# Patient Record
Sex: FEMALE | Race: WHITE | Employment: OTHER | URBAN - METROPOLITAN AREA
[De-identification: names, ages, dates, MRNs, and addresses within clinical notes are randomized per-mention and may not be internally consistent; named-entity substitution may affect disease eponyms.]

---

## 2017-02-13 ENCOUNTER — HOSPITAL ENCOUNTER (OUTPATIENT)
Dept: MAMMOGRAPHY | Facility: HOSPITAL | Age: 72
Discharge: HOME | End: 2017-02-13
Attending: INTERNAL MEDICINE
Payer: MEDICARE

## 2017-02-13 DIAGNOSIS — Z12.31 VISIT FOR SCREENING MAMMOGRAM: ICD-10-CM

## 2017-02-13 PROCEDURE — 77067 SCR MAMMO BI INCL CAD: CPT

## 2017-04-04 ENCOUNTER — APPOINTMENT (OUTPATIENT)
Dept: LAB | Age: 72
End: 2017-04-04
Attending: INTERNAL MEDICINE
Payer: MEDICARE

## 2017-04-04 DIAGNOSIS — E11.9 TYPE 2 DIABETES MELLITUS WITHOUT COMPLICATION, WITHOUT LONG-TERM CURRENT USE OF INSULIN (HCC): ICD-10-CM

## 2017-04-04 PROCEDURE — 83036 HEMOGLOBIN GLYCOSYLATED A1C: CPT

## 2017-04-04 PROCEDURE — 36415 COLL VENOUS BLD VENIPUNCTURE: CPT

## 2017-04-11 ENCOUNTER — OFFICE VISIT (OUTPATIENT)
Dept: INTERNAL MEDICINE CLINIC | Facility: CLINIC | Age: 72
End: 2017-04-11

## 2017-04-11 VITALS
DIASTOLIC BLOOD PRESSURE: 60 MMHG | OXYGEN SATURATION: 98 % | HEIGHT: 63 IN | SYSTOLIC BLOOD PRESSURE: 148 MMHG | HEART RATE: 74 BPM | BODY MASS INDEX: 26.93 KG/M2 | TEMPERATURE: 99 F | WEIGHT: 152 LBS

## 2017-04-11 DIAGNOSIS — E78.2 HYPERLIPIDEMIA, MIXED: ICD-10-CM

## 2017-04-11 DIAGNOSIS — E11.9 TYPE 2 DIABETES MELLITUS WITHOUT COMPLICATION, WITHOUT LONG-TERM CURRENT USE OF INSULIN (HCC): Primary | ICD-10-CM

## 2017-04-11 DIAGNOSIS — I10 ESSENTIAL HYPERTENSION: ICD-10-CM

## 2017-04-11 PROCEDURE — 99214 OFFICE O/P EST MOD 30 MIN: CPT | Performed by: INTERNAL MEDICINE

## 2017-04-11 PROCEDURE — G0463 HOSPITAL OUTPT CLINIC VISIT: HCPCS | Performed by: INTERNAL MEDICINE

## 2017-04-11 RX ORDER — AMLODIPINE BESYLATE 10 MG/1
10 TABLET ORAL DAILY
Qty: 90 TABLET | Refills: 3 | Status: SHIPPED | OUTPATIENT
Start: 2017-04-11 | End: 2018-03-20

## 2017-04-11 RX ORDER — GLIMEPIRIDE 4 MG/1
4 TABLET ORAL
Qty: 90 TABLET | Refills: 3 | Status: SHIPPED | OUTPATIENT
Start: 2017-04-11 | End: 2017-07-18

## 2017-04-11 NOTE — PROGRESS NOTES
Mauricio Selby is a 66year old female who presents for     Check at 3 1/2  mo     Feels good.      Diabetes:      sugars are 95 to 125.   No low sugars. Tolerated increase of amaryl to 3 mg daily. \"I think it brought it down a little bit. \"     HTN; H SpO2 98%      Wt Readings from Last 6 Encounters:  04/11/17 : 152 lb (68.947 kg)  12/28/16 : 153 lb (69.4 kg)  07/15/16 : 153 lb (69.4 kg)  04/15/16 : 153 lb (69.4 kg)  03/02/16 : 156 lb (70.761 kg)  10/06/15 : 156 lb (70.761 kg)    bp on my recheck is 144 total) by mouth every morning before breakfast. Disp: 90 tablet Rfl: 3   AmLODIPine Besylate (NORVASC) 10 MG Oral Tab Take 1 tablet (10 mg total) by mouth daily.  Disp: 90 tablet Rfl: 3   Enalapril Maleate (VASOTEC) 20 MG Oral Tab Take 1 tablet (20 mg total

## 2017-05-16 ENCOUNTER — TELEPHONE (OUTPATIENT)
Dept: INTERNAL MEDICINE CLINIC | Facility: CLINIC | Age: 72
End: 2017-05-16

## 2017-05-16 RX ORDER — SIMVASTATIN 5 MG
5 TABLET ORAL DAILY
Qty: 90 TABLET | Refills: 3 | Status: SHIPPED | OUTPATIENT
Start: 2017-05-16 | End: 2018-03-20

## 2017-05-16 RX ORDER — ENALAPRIL MALEATE 20 MG/1
20 TABLET ORAL DAILY
Qty: 90 TABLET | Refills: 3 | Status: SHIPPED | OUTPATIENT
Start: 2017-05-16 | End: 2018-03-20

## 2017-05-16 RX ORDER — HYDROCHLOROTHIAZIDE 50 MG/1
50 TABLET ORAL DAILY
Qty: 90 TABLET | Refills: 3 | Status: SHIPPED | OUTPATIENT
Start: 2017-05-16 | End: 2018-03-20

## 2017-05-16 NOTE — TELEPHONE ENCOUNTER
Pt. Needs new Rx for all her meds : Januvia, Metformin, Simvastatin, Atenolol, Hydrochlorothiazide   Ph. # [phone] [facility]  Ph. # [phone] Routed to Rx

## 2017-05-16 NOTE — TELEPHONE ENCOUNTER
Called patient and clarified dosages , also patient not on Atenolol but Enalapril 20 mg  - RX sent to [facility]

## 2017-05-22 ENCOUNTER — TELEPHONE (OUTPATIENT)
Dept: INTERNAL MEDICINE CLINIC | Facility: CLINIC | Age: 72
End: 2017-05-22

## 2017-05-22 NOTE — TELEPHONE ENCOUNTER
Pt. Is requesting a refill on: Januvia    [facility] # [phone]     [facility] ph. # [phone]   Routed to Rx

## 2017-05-22 NOTE — TELEPHONE ENCOUNTER
Patient ordered this medication on 5/16 & it was not done. All others were received, but patient is questioning why the Januvia was not ordered. Please refill this medication with [facility].

## 2017-07-06 RX ORDER — GLIMEPIRIDE 1 MG/1
TABLET ORAL
Qty: 90 TABLET | Refills: 3 | OUTPATIENT
Start: 2017-07-06

## 2017-07-11 ENCOUNTER — APPOINTMENT (OUTPATIENT)
Dept: LAB | Age: 72
End: 2017-07-11
Attending: INTERNAL MEDICINE
Payer: MEDICARE

## 2017-07-11 DIAGNOSIS — E11.9 TYPE 2 DIABETES MELLITUS WITHOUT COMPLICATION, WITHOUT LONG-TERM CURRENT USE OF INSULIN (HCC): ICD-10-CM

## 2017-07-11 LAB
ALBUMIN SERPL BCP-MCNC: 4.2 G/DL (ref 3.5–4.8)
ALBUMIN/GLOB SERPL: 1.7 {RATIO} (ref 1–2)
ALP SERPL-CCNC: 60 U/L (ref 32–100)
ALT SERPL-CCNC: 25 U/L (ref 14–54)
ANION GAP SERPL CALC-SCNC: 11 MMOL/L (ref 0–18)
AST SERPL-CCNC: 32 U/L (ref 15–41)
BILIRUB SERPL-MCNC: 0.6 MG/DL (ref 0.3–1.2)
BUN SERPL-MCNC: 19 MG/DL (ref 8–20)
BUN/CREAT SERPL: 17.8 (ref 10–20)
CALCIUM SERPL-MCNC: 9.4 MG/DL (ref 8.5–10.5)
CHLORIDE SERPL-SCNC: 101 MMOL/L (ref 95–110)
CHOLEST SERPL-MCNC: 137 MG/DL (ref 110–200)
CO2 SERPL-SCNC: 25 MMOL/L (ref 22–32)
CREAT SERPL-MCNC: 1.07 MG/DL (ref 0.5–1.5)
GLOBULIN PLAS-MCNC: 2.5 G/DL (ref 2.5–3.7)
GLUCOSE SERPL-MCNC: 188 MG/DL (ref 70–99)
HBA1C MFR BLD: 7.9 % (ref 4–6)
HDLC SERPL-MCNC: 39 MG/DL
LDLC SERPL CALC-MCNC: 35 MG/DL (ref 0–99)
NONHDLC SERPL-MCNC: 98 MG/DL
OSMOLALITY UR CALC.SUM OF ELEC: 291 MOSM/KG (ref 275–295)
POTASSIUM SERPL-SCNC: 3.9 MMOL/L (ref 3.3–5.1)
PROT SERPL-MCNC: 6.7 G/DL (ref 5.9–8.4)
SODIUM SERPL-SCNC: 137 MMOL/L (ref 136–144)
TRIGL SERPL-MCNC: 315 MG/DL (ref 1–149)

## 2017-07-11 PROCEDURE — 80061 LIPID PANEL: CPT

## 2017-07-11 PROCEDURE — 36415 COLL VENOUS BLD VENIPUNCTURE: CPT

## 2017-07-11 PROCEDURE — 80053 COMPREHEN METABOLIC PANEL: CPT

## 2017-07-11 PROCEDURE — 83036 HEMOGLOBIN GLYCOSYLATED A1C: CPT

## 2017-07-14 ENCOUNTER — TELEPHONE (OUTPATIENT)
Dept: INTERNAL MEDICINE CLINIC | Facility: CLINIC | Age: 72
End: 2017-07-14

## 2017-07-14 NOTE — TELEPHONE ENCOUNTER
To nursing, please tell [facility] request for Cardizem. At visit on 4/11/17, cardizem cd 300 mg was changed to norvasc 10 mg daily. Thanks.

## 2017-07-14 NOTE — TELEPHONE ENCOUNTER
[facility] [place] [facility] to request a refill on:  Caridazem 30 mg  Ph.  # [phone]    Routed to Rx

## 2017-07-14 NOTE — TELEPHONE ENCOUNTER
To Dr. Sharita Marmolejo - see below - med not on current med module. Spoke with pt who states she has been off this medication for a while, since new medication was started (unable to remember name of med or date).   4/11/17 office note states pt is on cardizem 3

## 2017-07-17 NOTE — TELEPHONE ENCOUNTER
Request to discontinue Cardizem was faxed to [facility] at [phone]

## 2017-07-18 ENCOUNTER — OFFICE VISIT (OUTPATIENT)
Dept: INTERNAL MEDICINE CLINIC | Facility: CLINIC | Age: 72
End: 2017-07-18

## 2017-07-18 VITALS
DIASTOLIC BLOOD PRESSURE: 70 MMHG | SYSTOLIC BLOOD PRESSURE: 136 MMHG | TEMPERATURE: 99 F | WEIGHT: 149 LBS | BODY MASS INDEX: 26.4 KG/M2 | OXYGEN SATURATION: 98 % | HEIGHT: 63 IN | HEART RATE: 87 BPM

## 2017-07-18 DIAGNOSIS — I10 ESSENTIAL HYPERTENSION: ICD-10-CM

## 2017-07-18 DIAGNOSIS — E78.2 HYPERLIPIDEMIA, MIXED: ICD-10-CM

## 2017-07-18 DIAGNOSIS — E11.9 TYPE 2 DIABETES MELLITUS WITHOUT COMPLICATION, WITHOUT LONG-TERM CURRENT USE OF INSULIN (HCC): Primary | ICD-10-CM

## 2017-07-18 PROCEDURE — G0463 HOSPITAL OUTPT CLINIC VISIT: HCPCS | Performed by: INTERNAL MEDICINE

## 2017-07-18 PROCEDURE — 99214 OFFICE O/P EST MOD 30 MIN: CPT | Performed by: INTERNAL MEDICINE

## 2017-07-18 RX ORDER — GLIMEPIRIDE 4 MG/1
8 TABLET ORAL
Qty: 180 TABLET | Refills: 3 | Status: SHIPPED | OUTPATIENT
Start: 2017-07-18 | End: 2018-03-20

## 2017-07-18 NOTE — PROGRESS NOTES
Jose Raul Juárez is a 66year old female who presents for     Check at 3 mo      Feels good.       Diabetes: sugars are 95 to 112.   No low sugars. Tolerated increase of amaryl to 4 mg daily. Pt feels it helped her sugars.        HTN; Home BPs are 120s over 87   Temp 98.6 °F (37 °C)   Ht 5' 3\" (1.6 m)   Wt 149 lb (67.6 kg)   SpO2 98%   BMI 26.39 kg/m²       Wt Readings from Last 6 Encounters:  07/18/17 : 149 lb (67.6 kg)  04/11/17 : 152 lb (68.9 kg)  12/28/16 : 153 lb (69.4 kg)  07/15/16 : 153 lb (69.4 kg) issues and plan.          Current Outpatient Prescriptions:  glimepiride (AMARYL) 4 MG Oral Tab Take 2 tablets (8 mg total) by mouth every morning before breakfast. Disp: 180 tablet Rfl: 3   SITagliptin Phosphate (JANUVIA) 100 MG Oral Tab Take 1 tablet (10

## 2017-11-14 ENCOUNTER — APPOINTMENT (OUTPATIENT)
Dept: LAB | Age: 72
End: 2017-11-14
Attending: INTERNAL MEDICINE
Payer: MEDICARE

## 2017-11-14 DIAGNOSIS — E11.9 TYPE 2 DIABETES MELLITUS WITHOUT COMPLICATION, WITHOUT LONG-TERM CURRENT USE OF INSULIN (HCC): ICD-10-CM

## 2017-11-14 PROCEDURE — 83036 HEMOGLOBIN GLYCOSYLATED A1C: CPT

## 2017-11-14 PROCEDURE — 36415 COLL VENOUS BLD VENIPUNCTURE: CPT

## 2017-11-20 ENCOUNTER — OFFICE VISIT (OUTPATIENT)
Dept: INTERNAL MEDICINE CLINIC | Facility: CLINIC | Age: 72
End: 2017-11-20

## 2017-11-20 VITALS
DIASTOLIC BLOOD PRESSURE: 52 MMHG | BODY MASS INDEX: 25.69 KG/M2 | HEIGHT: 63 IN | WEIGHT: 145 LBS | TEMPERATURE: 98 F | OXYGEN SATURATION: 98 % | HEART RATE: 90 BPM | SYSTOLIC BLOOD PRESSURE: 124 MMHG

## 2017-11-20 DIAGNOSIS — Z12.31 VISIT FOR SCREENING MAMMOGRAM: ICD-10-CM

## 2017-11-20 DIAGNOSIS — E78.2 HYPERLIPIDEMIA, MIXED: ICD-10-CM

## 2017-11-20 DIAGNOSIS — I10 ESSENTIAL HYPERTENSION: ICD-10-CM

## 2017-11-20 DIAGNOSIS — M85.9 DISORDER OF BONE DENSITY AND STRUCTURE, UNSPECIFIED: ICD-10-CM

## 2017-11-20 DIAGNOSIS — E11.9 TYPE 2 DIABETES MELLITUS WITHOUT COMPLICATION, WITHOUT LONG-TERM CURRENT USE OF INSULIN (HCC): Primary | ICD-10-CM

## 2017-11-20 PROCEDURE — G0463 HOSPITAL OUTPT CLINIC VISIT: HCPCS | Performed by: INTERNAL MEDICINE

## 2017-11-20 PROCEDURE — 99214 OFFICE O/P EST MOD 30 MIN: CPT | Performed by: INTERNAL MEDICINE

## 2017-11-20 RX ORDER — BIMATOPROST 0.01 %
1 DROPS OPHTHALMIC (EYE) DAILY
COMMUNITY
Start: 2017-11-15

## 2017-11-20 NOTE — PROGRESS NOTES
Jovanny Samuel is a 66year old female who presents for     Check at 4 mo      Feels good.       Diabetes: sugars are 110-120 per pt.   No low sugars. Tolerated incr amaryl from 4 mg to 8 mg daily.  Lost 4 # in last 4 months-w diet.       HTN; Home BPs are 124/52 (BP Location: Right arm, Patient Position: Sitting, Cuff Size: adult)   Pulse 90   Temp 98.1 °F (36.7 °C) (Oral)   Ht 5' 3\" (1.6 m)   Wt 145 lb (65.8 kg)   SpO2 98%   BMI 25.69 kg/m²       Wt Readings from Last 6 Encounters:  11/20/17 : 145 lb (65. except A1c 7.8.    Lab 4/4/17-A1c 7.5.     Lab 7/11/17- lipid cmp [id number] 7.9, . Lab 11/14/17-A1c 6.9.      Ret in 4 mo with cbc cmp tsh lipid A1c ua/ma.      Patient expresses understanding of above issues and plan.      - XR DEXA BONE DENSITOMETRY

## 2018-02-02 ENCOUNTER — TELEPHONE (OUTPATIENT)
Dept: INTERNAL MEDICINE CLINIC | Facility: CLINIC | Age: 73
End: 2018-02-02

## 2018-02-02 DIAGNOSIS — Z78.0 UNCOMPLICATED MENOPAUSE: Primary | ICD-10-CM

## 2018-03-05 ENCOUNTER — HOSPITAL ENCOUNTER (OUTPATIENT)
Dept: BONE DENSITY | Facility: HOSPITAL | Age: 73
Discharge: HOME | End: 2018-03-05
Attending: INTERNAL MEDICINE
Payer: MEDICARE

## 2018-03-05 ENCOUNTER — HOSPITAL ENCOUNTER (OUTPATIENT)
Dept: MAMMOGRAPHY | Facility: HOSPITAL | Age: 73
Discharge: HOME | End: 2018-03-05
Attending: INTERNAL MEDICINE
Payer: MEDICARE

## 2018-03-05 DIAGNOSIS — Z78.0 UNCOMPLICATED MENOPAUSE: ICD-10-CM

## 2018-03-05 DIAGNOSIS — Z12.31 VISIT FOR SCREENING MAMMOGRAM: ICD-10-CM

## 2018-03-05 PROCEDURE — 77080 DXA BONE DENSITY AXIAL: CPT | Performed by: INTERNAL MEDICINE

## 2018-03-05 PROCEDURE — 77067 SCR MAMMO BI INCL CAD: CPT | Performed by: INTERNAL MEDICINE

## 2018-03-06 ENCOUNTER — TELEPHONE (OUTPATIENT)
Dept: INTERNAL MEDICINE CLINIC | Facility: CLINIC | Age: 73
End: 2018-03-06

## 2018-03-06 NOTE — TELEPHONE ENCOUNTER
Called patient and relayed Dr Mackay Ill message on voicemail per hipaa. Relayed to call back if any further questions.

## 2018-03-06 NOTE — TELEPHONE ENCOUNTER
To nursing, please tell patient DEXA scan done on 3/5/18–results are normal–no osteoporosis. Mammogram results also ok. Thanks.

## 2018-03-09 PROCEDURE — 88305 TISSUE EXAM BY PATHOLOGIST: CPT | Performed by: INTERNAL MEDICINE

## 2018-03-13 ENCOUNTER — LAB ENCOUNTER (OUTPATIENT)
Dept: LAB | Age: 73
End: 2018-03-13
Attending: INTERNAL MEDICINE
Payer: MEDICARE

## 2018-03-13 DIAGNOSIS — E11.9 TYPE 2 DIABETES MELLITUS WITHOUT COMPLICATION, WITHOUT LONG-TERM CURRENT USE OF INSULIN (HCC): ICD-10-CM

## 2018-03-13 LAB
ALBUMIN SERPL BCP-MCNC: 4.2 G/DL (ref 3.5–4.8)
ALBUMIN/GLOB SERPL: 1.7 {RATIO} (ref 1–2)
ALP SERPL-CCNC: 51 U/L (ref 32–100)
ALT SERPL-CCNC: 16 U/L (ref 14–54)
ANION GAP SERPL CALC-SCNC: 12 MMOL/L (ref 0–18)
AST SERPL-CCNC: 20 U/L (ref 15–41)
BASOPHILS # BLD: 0.1 K/UL (ref 0–0.2)
BASOPHILS NFR BLD: 1 %
BILIRUB SERPL-MCNC: 0.8 MG/DL (ref 0.3–1.2)
BUN SERPL-MCNC: 17 MG/DL (ref 8–20)
BUN/CREAT SERPL: 14.4 (ref 10–20)
CALCIUM SERPL-MCNC: 9.6 MG/DL (ref 8.5–10.5)
CHLORIDE SERPL-SCNC: 101 MMOL/L (ref 95–110)
CHOLEST SERPL-MCNC: 120 MG/DL (ref 110–200)
CO2 SERPL-SCNC: 24 MMOL/L (ref 22–32)
CREAT SERPL-MCNC: 1.18 MG/DL (ref 0.5–1.5)
EOSINOPHIL # BLD: 0.7 K/UL (ref 0–0.7)
EOSINOPHIL NFR BLD: 7 %
ERYTHROCYTE [DISTWIDTH] IN BLOOD BY AUTOMATED COUNT: 13.7 % (ref 11–15)
GLOBULIN PLAS-MCNC: 2.5 G/DL (ref 2.5–3.7)
GLUCOSE SERPL-MCNC: 167 MG/DL (ref 70–99)
HBA1C MFR BLD: 6.5 % (ref 4–6)
HCT VFR BLD AUTO: 34.4 % (ref 35–48)
HDLC SERPL-MCNC: 40 MG/DL
HGB BLD-MCNC: 12.1 G/DL (ref 12–16)
LDLC SERPL CALC-MCNC: 35 MG/DL (ref 0–99)
LYMPHOCYTES # BLD: 1.5 K/UL (ref 1–4)
LYMPHOCYTES NFR BLD: 15 %
MCH RBC QN AUTO: 30.9 PG (ref 27–32)
MCHC RBC AUTO-ENTMCNC: 35.3 G/DL (ref 32–37)
MCV RBC AUTO: 87.5 FL (ref 80–100)
MONOCYTES # BLD: 0.5 K/UL (ref 0–1)
MONOCYTES NFR BLD: 6 %
NEUTROPHILS # BLD AUTO: 6.8 K/UL (ref 1.8–7.7)
NEUTROPHILS NFR BLD: 71 %
NONHDLC SERPL-MCNC: 80 MG/DL
OSMOLALITY UR CALC.SUM OF ELEC: 289 MOSM/KG (ref 275–295)
PATIENT FASTING: YES
PLATELET # BLD AUTO: 291 K/UL (ref 140–400)
PMV BLD AUTO: 8.6 FL (ref 7.4–10.3)
POTASSIUM SERPL-SCNC: 3.6 MMOL/L (ref 3.3–5.1)
PROT SERPL-MCNC: 6.7 G/DL (ref 5.9–8.4)
RBC # BLD AUTO: 3.93 M/UL (ref 3.7–5.4)
SODIUM SERPL-SCNC: 137 MMOL/L (ref 136–144)
TRIGL SERPL-MCNC: 227 MG/DL (ref 1–149)
TSH SERPL-ACNC: 3.84 UIU/ML (ref 0.45–5.33)
WBC # BLD AUTO: 9.5 K/UL (ref 4–11)

## 2018-03-13 PROCEDURE — 80053 COMPREHEN METABOLIC PANEL: CPT

## 2018-03-13 PROCEDURE — 84443 ASSAY THYROID STIM HORMONE: CPT

## 2018-03-13 PROCEDURE — 80061 LIPID PANEL: CPT

## 2018-03-13 PROCEDURE — 85025 COMPLETE CBC W/AUTO DIFF WBC: CPT

## 2018-03-13 PROCEDURE — 36415 COLL VENOUS BLD VENIPUNCTURE: CPT

## 2018-03-13 PROCEDURE — 83036 HEMOGLOBIN GLYCOSYLATED A1C: CPT

## 2018-03-15 ENCOUNTER — LAB ENCOUNTER (OUTPATIENT)
Dept: LAB | Age: 73
End: 2018-03-15
Attending: INTERNAL MEDICINE
Payer: MEDICARE

## 2018-03-15 DIAGNOSIS — E11.9 TYPE 2 DIABETES MELLITUS WITHOUT COMPLICATION, WITHOUT LONG-TERM CURRENT USE OF INSULIN (HCC): ICD-10-CM

## 2018-03-15 LAB
BILIRUB UR QL: NEGATIVE
COLOR UR: YELLOW
CREAT UR-MCNC: 143.1 MG/DL
GLUCOSE UR-MCNC: NEGATIVE MG/DL
HGB UR QL STRIP.AUTO: NEGATIVE
KETONES UR-MCNC: NEGATIVE MG/DL
MICROALBUMIN UR-MCNC: 2.7 MG/DL (ref 0–1.8)
MICROALBUMIN/CREAT UR: 18.9 MG/G{CREAT} (ref 0–20)
NITRITE UR QL STRIP.AUTO: NEGATIVE
PH UR: 5 [PH] (ref 5–8)
PROT UR-MCNC: NEGATIVE MG/DL
RBC #/AREA URNS AUTO: 5 /HPF
SP GR UR STRIP: 1.02 (ref 1–1.03)
UROBILINOGEN UR STRIP-ACNC: <2
VIT C UR-MCNC: NEGATIVE MG/DL
WBC #/AREA URNS AUTO: 61 /HPF

## 2018-03-15 PROCEDURE — 87086 URINE CULTURE/COLONY COUNT: CPT

## 2018-03-15 PROCEDURE — 82043 UR ALBUMIN QUANTITATIVE: CPT

## 2018-03-15 PROCEDURE — 82570 ASSAY OF URINE CREATININE: CPT

## 2018-03-15 PROCEDURE — 87077 CULTURE AEROBIC IDENTIFY: CPT

## 2018-03-15 PROCEDURE — 87186 SC STD MICRODIL/AGAR DIL: CPT

## 2018-03-15 PROCEDURE — 81001 URINALYSIS AUTO W/SCOPE: CPT

## 2018-03-16 ENCOUNTER — TELEPHONE (OUTPATIENT)
Dept: INTERNAL MEDICINE CLINIC | Facility: CLINIC | Age: 73
End: 2018-03-16

## 2018-03-18 RX ORDER — SULFAMETHOXAZOLE AND TRIMETHOPRIM 800; 160 MG/1; MG/1
1 TABLET ORAL 2 TIMES DAILY
Qty: 14 TABLET | Refills: 0 | Status: SHIPPED | OUTPATIENT
Start: 2018-03-18 | End: 2018-07-23

## 2018-03-19 NOTE — TELEPHONE ENCOUNTER
To nursing,   please tell pt blood and urine tests done on 3/13 & 3/15/18--results are ok except she has a urinary tract infection. Tell her I sent a prescription to [facility] pharmacy in [address] [place] [address] for an antibiotic -bactrim ds bid #14.    See me 3/20/18 as plann

## 2018-03-20 ENCOUNTER — OFFICE VISIT (OUTPATIENT)
Dept: INTERNAL MEDICINE CLINIC | Facility: CLINIC | Age: 73
End: 2018-03-20

## 2018-03-20 VITALS
WEIGHT: 147 LBS | DIASTOLIC BLOOD PRESSURE: 68 MMHG | HEIGHT: 62.75 IN | TEMPERATURE: 98 F | HEART RATE: 80 BPM | BODY MASS INDEX: 26.38 KG/M2 | SYSTOLIC BLOOD PRESSURE: 130 MMHG

## 2018-03-20 DIAGNOSIS — R82.90 ABNORMAL URINALYSIS: ICD-10-CM

## 2018-03-20 DIAGNOSIS — E11.9 TYPE 2 DIABETES MELLITUS WITHOUT COMPLICATION, WITHOUT LONG-TERM CURRENT USE OF INSULIN (HCC): ICD-10-CM

## 2018-03-20 DIAGNOSIS — I10 ESSENTIAL HYPERTENSION: ICD-10-CM

## 2018-03-20 DIAGNOSIS — E78.2 HYPERLIPIDEMIA, MIXED: ICD-10-CM

## 2018-03-20 DIAGNOSIS — N39.0 ACUTE UTI: Primary | ICD-10-CM

## 2018-03-20 PROCEDURE — 99214 OFFICE O/P EST MOD 30 MIN: CPT | Performed by: INTERNAL MEDICINE

## 2018-03-20 PROCEDURE — G0463 HOSPITAL OUTPT CLINIC VISIT: HCPCS | Performed by: INTERNAL MEDICINE

## 2018-03-20 RX ORDER — ENALAPRIL MALEATE 20 MG/1
20 TABLET ORAL DAILY
Qty: 90 TABLET | Refills: 3 | Status: SHIPPED | OUTPATIENT
Start: 2018-03-20 | End: 2019-04-03

## 2018-03-20 RX ORDER — GLIMEPIRIDE 4 MG/1
8 TABLET ORAL
Qty: 180 TABLET | Refills: 3 | Status: SHIPPED | OUTPATIENT
Start: 2018-03-20 | End: 2019-03-22

## 2018-03-20 RX ORDER — SIMVASTATIN 5 MG
5 TABLET ORAL DAILY
Qty: 90 TABLET | Refills: 3 | Status: SHIPPED | OUTPATIENT
Start: 2018-03-20 | End: 2019-04-03

## 2018-03-20 RX ORDER — AMLODIPINE BESYLATE 10 MG/1
10 TABLET ORAL DAILY
Qty: 90 TABLET | Refills: 3 | Status: SHIPPED | OUTPATIENT
Start: 2018-03-20 | End: 2019-04-03

## 2018-03-20 RX ORDER — HYDROCHLOROTHIAZIDE 50 MG/1
50 TABLET ORAL DAILY
Qty: 90 TABLET | Refills: 3 | Status: SHIPPED | OUTPATIENT
Start: 2018-03-20 | End: 2019-04-03

## 2018-03-20 NOTE — PROGRESS NOTES
Juventino Rucker is a 74year old female who presents for     Check at 4 mo      Feels good.       Diabetes: sugars are 110-180 per pt.  per pt avg about 120. No low sugars.      HTN; Home BPs are 130s over 60s. Tolerating meds.      Argenis Ludwig for a use: No                   Allergies:    Atorvastatin                Comment:Other reaction(s): ears ring    Review of systems:  Constitutional:  No fever, loss of appetite or unintentional weight loss  Respiratory: No cough, wheezing or dyspnea  Cardiac: N 6/6/15- Carotids-L mild, R normal; no a fib. No AAA or PAD. BMI 28. Repeat carotid US in 1-2 yrs. lifeline screening 10/3/17-carotids mild bilat, no AF, no AAA, no PAD, \"high probability of osteoporosis. \"-->check dexa 3/2018-nl. .       Lab 7/11/17- lipid MD  3/20/2018

## 2018-03-29 ENCOUNTER — APPOINTMENT (OUTPATIENT)
Dept: LAB | Age: 73
End: 2018-03-29
Attending: INTERNAL MEDICINE
Payer: MEDICARE

## 2018-03-29 LAB
BILIRUB UR QL: NEGATIVE
CLARITY UR: CLEAR
COLOR UR: YELLOW
GLUCOSE UR-MCNC: NEGATIVE MG/DL
HGB UR QL STRIP.AUTO: NEGATIVE
KETONES UR-MCNC: NEGATIVE MG/DL
LEUKOCYTE ESTERASE UR QL STRIP.AUTO: NEGATIVE
NITRITE UR QL STRIP.AUTO: NEGATIVE
PH UR: 5 [PH] (ref 5–8)
PROT UR-MCNC: NEGATIVE MG/DL
SP GR UR STRIP: 1.02 (ref 1–1.03)
UROBILINOGEN UR STRIP-ACNC: <2
VIT C UR-MCNC: NEGATIVE MG/DL

## 2018-03-29 PROCEDURE — 81003 URINALYSIS AUTO W/O SCOPE: CPT | Performed by: INTERNAL MEDICINE

## 2018-03-29 PROCEDURE — 81001 URINALYSIS AUTO W/SCOPE: CPT | Performed by: INTERNAL MEDICINE

## 2018-03-30 ENCOUNTER — TELEPHONE (OUTPATIENT)
Dept: INTERNAL MEDICINE CLINIC | Facility: CLINIC | Age: 73
End: 2018-03-30

## 2018-03-30 DIAGNOSIS — R82.90 ABNORMAL URINALYSIS: Primary | ICD-10-CM

## 2018-03-30 LAB
BACTERIA UR QL AUTO: NEGATIVE /HPF
HYALINE CASTS #/AREA URNS AUTO: 3 /LPF
RBC #/AREA URNS AUTO: 1 /HPF
WBC #/AREA URNS AUTO: 2 /HPF

## 2018-03-30 PROCEDURE — 81015 MICROSCOPIC EXAM OF URINE: CPT | Performed by: INTERNAL MEDICINE

## 2018-07-17 ENCOUNTER — APPOINTMENT (OUTPATIENT)
Dept: LAB | Age: 73
End: 2018-07-17
Attending: INTERNAL MEDICINE
Payer: MEDICARE

## 2018-07-17 ENCOUNTER — TELEPHONE (OUTPATIENT)
Dept: INTERNAL MEDICINE CLINIC | Facility: CLINIC | Age: 73
End: 2018-07-17

## 2018-07-17 DIAGNOSIS — E78.5 HYPERLIPIDEMIA, UNSPECIFIED HYPERLIPIDEMIA TYPE: ICD-10-CM

## 2018-07-17 DIAGNOSIS — I10 ESSENTIAL HYPERTENSION: ICD-10-CM

## 2018-07-17 DIAGNOSIS — R82.90 ABNORMAL URINALYSIS: Primary | ICD-10-CM

## 2018-07-17 DIAGNOSIS — I10 ESSENTIAL HYPERTENSION: Primary | ICD-10-CM

## 2018-07-17 DIAGNOSIS — E11.9 TYPE 2 DIABETES MELLITUS WITHOUT COMPLICATION, WITHOUT LONG-TERM CURRENT USE OF INSULIN (HCC): ICD-10-CM

## 2018-07-17 LAB
ALBUMIN SERPL BCP-MCNC: 4.3 G/DL (ref 3.5–4.8)
ALBUMIN/GLOB SERPL: 1.7 {RATIO} (ref 1–2)
ALP SERPL-CCNC: 62 U/L (ref 32–100)
ALT SERPL-CCNC: 23 U/L (ref 14–54)
ANION GAP SERPL CALC-SCNC: 13 MMOL/L (ref 0–18)
AST SERPL-CCNC: 25 U/L (ref 15–41)
BILIRUB SERPL-MCNC: 0.7 MG/DL (ref 0.3–1.2)
BUN SERPL-MCNC: 20 MG/DL (ref 8–20)
BUN/CREAT SERPL: 16.8 (ref 10–20)
CALCIUM SERPL-MCNC: 9.4 MG/DL (ref 8.5–10.5)
CHLORIDE SERPL-SCNC: 97 MMOL/L (ref 95–110)
CHOLEST SERPL-MCNC: 145 MG/DL (ref 110–200)
CO2 SERPL-SCNC: 24 MMOL/L (ref 22–32)
CREAT SERPL-MCNC: 1.19 MG/DL (ref 0.5–1.5)
GLOBULIN PLAS-MCNC: 2.6 G/DL (ref 2.5–3.7)
GLUCOSE SERPL-MCNC: 207 MG/DL (ref 70–99)
HDLC SERPL-MCNC: 38 MG/DL
LDLC SERPL CALC-MCNC: 45 MG/DL (ref 0–99)
NONHDLC SERPL-MCNC: 107 MG/DL
OSMOLALITY UR CALC.SUM OF ELEC: 287 MOSM/KG (ref 275–295)
PATIENT FASTING: YES
POTASSIUM SERPL-SCNC: 4.5 MMOL/L (ref 3.3–5.1)
PROT SERPL-MCNC: 6.9 G/DL (ref 5.9–8.4)
RBC #/AREA URNS AUTO: 1 /HPF
SODIUM SERPL-SCNC: 134 MMOL/L (ref 136–144)
TRIGL SERPL-MCNC: 309 MG/DL (ref 1–149)
WBC #/AREA URNS AUTO: 51 /HPF

## 2018-07-17 PROCEDURE — 36415 COLL VENOUS BLD VENIPUNCTURE: CPT

## 2018-07-17 PROCEDURE — 83036 HEMOGLOBIN GLYCOSYLATED A1C: CPT

## 2018-07-17 PROCEDURE — 87077 CULTURE AEROBIC IDENTIFY: CPT | Performed by: INTERNAL MEDICINE

## 2018-07-17 PROCEDURE — 87086 URINE CULTURE/COLONY COUNT: CPT | Performed by: INTERNAL MEDICINE

## 2018-07-17 PROCEDURE — 80053 COMPREHEN METABOLIC PANEL: CPT

## 2018-07-17 PROCEDURE — 87186 SC STD MICRODIL/AGAR DIL: CPT | Performed by: INTERNAL MEDICINE

## 2018-07-17 PROCEDURE — 81015 MICROSCOPIC EXAM OF URINE: CPT | Performed by: INTERNAL MEDICINE

## 2018-07-17 PROCEDURE — 80061 LIPID PANEL: CPT

## 2018-07-17 NOTE — TELEPHONE ENCOUNTER
Called reference lab and instructed to add on urine culture to UA specimen. I was advised to enter order for UC and they will attach the orders on their end. Urine culture ordered.

## 2018-07-17 NOTE — TELEPHONE ENCOUNTER
Patient in lab requesting to have labs drawn. Per Dr. Marilou Stoddard last [address] note on March 20, 2018, patient to return in 4 mos for CMP, Lipid panel and A1C due with [facility] annual visit. Orders entered. To Dr. Lindsey Casey.

## 2018-07-17 NOTE — TELEPHONE ENCOUNTER
Message noted–Lab before 7/23/18 visit–cmp lipid A1c. I note order was also entered for UA microscopic.

## 2018-07-17 NOTE — TELEPHONE ENCOUNTER
To nursing, please call laboratory and ask if they can add on urine culture to today's urinalysis. Dx-abnormal urinalysis. Thanks. Note to self– lab 7/17/18–CMP, lipid, A1c, UA microscopic– triglycerides 309, UA 51 WBCs. A1c pending.

## 2018-07-18 LAB — HBA1C MFR BLD: 8.2 % (ref 4–6)

## 2018-07-19 RX ORDER — SULFAMETHOXAZOLE AND TRIMETHOPRIM 800; 160 MG/1; MG/1
1 TABLET ORAL 2 TIMES DAILY
Qty: 14 TABLET | Refills: 0 | Status: SHIPPED | OUTPATIENT
Start: 2018-07-19 | End: 2018-07-23

## 2018-07-19 NOTE — TELEPHONE ENCOUNTER
Called and Relayed MD's message to patient---verbalized understanding. pharmacy verified and eRx sent.

## 2018-07-19 NOTE — TELEPHONE ENCOUNTER
Noted, I would treat her with Bactrim twice daily for 7 days, I did pend this prescription, nursing please send it into her preferred pharmacy after speaking with the patient.   She has an E. coli type urinary tract infection that requires treatment with a

## 2018-07-20 NOTE — TELEPHONE ENCOUNTER
I note results–Urine culture > 100 K E. Coli, pansensitive. Rx Bactrim twice daily #14 was sent to the pharmacy yesterday. Patient has appointment with me on 7/23/18.

## 2018-07-23 ENCOUNTER — OFFICE VISIT (OUTPATIENT)
Dept: INTERNAL MEDICINE CLINIC | Facility: CLINIC | Age: 73
End: 2018-07-23
Payer: MEDICARE

## 2018-07-23 VITALS
HEART RATE: 75 BPM | TEMPERATURE: 98 F | DIASTOLIC BLOOD PRESSURE: 68 MMHG | HEIGHT: 62.75 IN | OXYGEN SATURATION: 98 % | BODY MASS INDEX: 26.38 KG/M2 | WEIGHT: 147 LBS | SYSTOLIC BLOOD PRESSURE: 142 MMHG

## 2018-07-23 DIAGNOSIS — N39.0 E. COLI UTI: ICD-10-CM

## 2018-07-23 DIAGNOSIS — B96.20 E. COLI UTI: ICD-10-CM

## 2018-07-23 DIAGNOSIS — Z00.00 MEDICARE ANNUAL WELLNESS VISIT, SUBSEQUENT: Primary | ICD-10-CM

## 2018-07-23 DIAGNOSIS — E11.9 TYPE 2 DIABETES MELLITUS WITHOUT COMPLICATION, WITHOUT LONG-TERM CURRENT USE OF INSULIN (HCC): ICD-10-CM

## 2018-07-23 DIAGNOSIS — E78.2 HYPERLIPIDEMIA, MIXED: ICD-10-CM

## 2018-07-23 DIAGNOSIS — I10 ESSENTIAL HYPERTENSION: ICD-10-CM

## 2018-07-23 DIAGNOSIS — R11.2 NON-INTRACTABLE VOMITING WITH NAUSEA, UNSPECIFIED VOMITING TYPE: ICD-10-CM

## 2018-07-23 PROCEDURE — 99214 OFFICE O/P EST MOD 30 MIN: CPT | Performed by: INTERNAL MEDICINE

## 2018-07-23 PROCEDURE — G0463 HOSPITAL OUTPT CLINIC VISIT: HCPCS | Performed by: INTERNAL MEDICINE

## 2018-07-23 PROCEDURE — G0439 PPPS, SUBSEQ VISIT: HCPCS | Performed by: INTERNAL MEDICINE

## 2018-07-23 RX ORDER — ONDANSETRON 4 MG/1
4 TABLET, FILM COATED ORAL EVERY 8 HOURS PRN
Qty: 10 TABLET | Refills: 0 | Status: SHIPPED | OUTPATIENT
Start: 2018-07-23 | End: 2018-08-31 | Stop reason: ALTCHOICE

## 2018-07-23 RX ORDER — CEPHALEXIN 500 MG/1
500 CAPSULE ORAL 2 TIMES DAILY
Qty: 8 CAPSULE | Refills: 0 | Status: SHIPPED | OUTPATIENT
Start: 2018-07-23 | End: 2018-08-31 | Stop reason: ALTCHOICE

## 2018-07-23 NOTE — PROGRESS NOTES
Marisela Miller is a 74year old female who presents for     Check at [facility] and Medicare annual wellness     Feels the bactrim prescribed for UTI found on lab last wk is causing nausea.  Has vomited in last few days.    No abd pain exc upper abd \"sore\" fro Allergies:    Bactrim [Sulfametho*    NAUSEA AND VOMITING    Comment:Nausea and vomiting in 7/2018  Atorvastatin                Comment:Other reaction(s): ears ring    Review of systems:  Constitutional:  No fever or unintentional weight loss  Respirat mg daily and norvasc 10 mg daily. Hyperlipidemia--On simvastatin 5 mg daily. LDL 45 and  on 7/17/18. LBBB -LBBB on EKG of 5/19/14 visit-stress echo per Dr. Geoff Shah 6/11/14- nl.   Carotid bruits--bilat.  Lifeline screening 10/3/17-carotids mild bilat, glimepiride (AMARYL) 4 MG Oral Tab Take 2 tablets (8 mg total) by mouth every morning before breakfast. Disp: 180 tablet Rfl: 3   hydrochlorothiazide 50 MG Oral Tab Take 1 tablet (50 mg total) by mouth daily.  Disp: 90 tablet Rfl: 3   MetFORMIN HCl (GLUCO Yes    Hearing Problems?: No     Functional Status     Hearing Problems?: No    Vision Problems? : No    Difficulty walking?: No    Difficulty dressing or bathing?: No    Problems with daily activities? : No    Memory Problems?: No      Fall/Risk Assessmen have told me they think I may have hearing loss:  No           Visual Acuity     Right Eye Visual Acuity: Uncorrected Left Eye Visual Acuity: Uncorrected   Right Eye Chart Acuity: 20/40 Left Eye Chart Acuity: 20/30     Cognitive Assessment     What day of

## 2018-08-13 ENCOUNTER — TELEPHONE (OUTPATIENT)
Dept: INTERNAL MEDICINE CLINIC | Facility: CLINIC | Age: 73
End: 2018-08-13

## 2018-08-13 ENCOUNTER — NURSE ONLY (OUTPATIENT)
Dept: INTERNAL MEDICINE CLINIC | Facility: CLINIC | Age: 73
End: 2018-08-13
Payer: MEDICARE

## 2018-08-13 DIAGNOSIS — E11.9 TYPE 2 DIABETES MELLITUS WITHOUT COMPLICATION, WITHOUT LONG-TERM CURRENT USE OF INSULIN (HCC): Primary | ICD-10-CM

## 2018-08-13 PROCEDURE — G0463 HOSPITAL OUTPT CLINIC VISIT: HCPCS

## 2018-08-13 RX ORDER — PEN NEEDLE, DIABETIC 31 G X1/4"
NEEDLE, DISPOSABLE MISCELLANEOUS
Qty: 100 EACH | Refills: 1 | Status: SHIPPED | OUTPATIENT
Start: 2018-08-13 | End: 2020-04-20

## 2018-08-13 RX ORDER — PEN NEEDLE, DIABETIC 31 G X1/4"
NEEDLE, DISPOSABLE MISCELLANEOUS
Qty: 100 EACH | Refills: 1 | Status: SHIPPED | OUTPATIENT
Start: 2018-08-13 | End: 2018-08-13

## 2018-08-13 NOTE — TELEPHONE ENCOUNTER
RX sent to local pharmacy - called patient and relayed this message and asked her to call mail order pharmacy that she does not need RX from them - verbalized understanding . called mail order pharmacy, spoke with Darío Galvez - do not show anything on file - she

## 2018-08-13 NOTE — TELEPHONE ENCOUNTER
Pharmacist called back regarding needles - does not have ordered needles - ok to use any pen needles that fit with pen - verbalized understanding

## 2018-08-13 NOTE — TELEPHONE ENCOUNTER
Patient needs Insulin Pen Needles and Levemir [facility]xtouch [facility] [place]. Not to her mail-in Rx. Needs today.     Please send to Smita Buchanan [phone]  Routed low to Rx

## 2018-08-13 NOTE — PROGRESS NOTES
Pt here for DM insulin training  She has not used insulin in the past;  She does test her BS daily  She does not watch her CHOs although she does eat TID and maybe an evening snack  She walks 1 mile daily    Insulin pen technique discussed and demonstrate

## 2018-08-13 NOTE — TELEPHONE ENCOUNTER
Patient called back - RX did not go through - called pharmacy , spoke with Tita Montoya and called in [employer]

## 2018-08-21 ENCOUNTER — TELEPHONE (OUTPATIENT)
Dept: INTERNAL MEDICINE CLINIC | Facility: CLINIC | Age: 73
End: 2018-08-21

## 2018-08-21 RX ORDER — BLOOD-GLUCOSE METER
KIT MISCELLANEOUS
Qty: 100 STRIP | Refills: 0 | Status: SHIPPED | OUTPATIENT
Start: 2018-08-21 | End: 2018-08-23

## 2018-08-22 NOTE — TELEPHONE ENCOUNTER
[facility] test strips Missing/Illegible info on Rx  Routed to Rx  Placed in [address] [place] [address] folder fax.  # [phone]

## 2018-08-23 RX ORDER — BLOOD-GLUCOSE METER
KIT MISCELLANEOUS
Qty: 100 STRIP | Refills: 0 | Status: SHIPPED | OUTPATIENT
Start: 2018-08-23 | End: 2018-12-03

## 2018-08-31 ENCOUNTER — OFFICE VISIT (OUTPATIENT)
Dept: INTERNAL MEDICINE CLINIC | Facility: CLINIC | Age: 73
End: 2018-08-31
Payer: MEDICARE

## 2018-08-31 VITALS
OXYGEN SATURATION: 97 % | HEIGHT: 62.75 IN | BODY MASS INDEX: 25.98 KG/M2 | RESPIRATION RATE: 16 BRPM | DIASTOLIC BLOOD PRESSURE: 58 MMHG | SYSTOLIC BLOOD PRESSURE: 132 MMHG | HEART RATE: 78 BPM | WEIGHT: 144.81 LBS | TEMPERATURE: 99 F

## 2018-08-31 DIAGNOSIS — E11.9 TYPE 2 DIABETES MELLITUS WITHOUT COMPLICATION, WITH LONG-TERM CURRENT USE OF INSULIN (HCC): Primary | ICD-10-CM

## 2018-08-31 DIAGNOSIS — Z79.4 TYPE 2 DIABETES MELLITUS WITHOUT COMPLICATION, WITH LONG-TERM CURRENT USE OF INSULIN (HCC): Primary | ICD-10-CM

## 2018-08-31 PROCEDURE — 99213 OFFICE O/P EST LOW 20 MIN: CPT | Performed by: INTERNAL MEDICINE

## 2018-08-31 PROCEDURE — G0463 HOSPITAL OUTPT CLINIC VISIT: HCPCS | Performed by: INTERNAL MEDICINE

## 2018-08-31 NOTE — PROGRESS NOTES
Israel Roman is a 74year old female who presents for     Check at 1 mo. Diabetes: pt notes sugars are better since addn of lantus 5 units daily. sugars are , most are in low 100s. Doing ok w injections. Lost 3 # -is eating lighter.     Nause (Oral)   Resp 16   Ht 5' 2.75\" (1.594 m)   Wt 144 lb 12.8 oz (65.7 kg)   SpO2 97%   BMI 25.85 kg/m²       Wt Readings from Last 6 Encounters:  08/31/18 : 144 lb 12.8 oz (65.7 kg)  07/23/18 : 147 lb (66.7 kg)  03/20/18 : 147 lb (66.7 kg)  03/09/18 : 143 lb tsh lipid A1c, ua w reflex, ma--UA 61 wbc's, 5 rbc's, Urine culture--2 strains Klebsiella. Lab-UA 3/29/18-normal.  lab 7/17/18–CMP, lipid, A1c, UA- creat 1.19, GFR 45, , UA 51 WBCs.  A1c 8.2. Urine culture > 100 K E.  Coli, pansensitive.      Ret i

## 2018-10-31 ENCOUNTER — APPOINTMENT (OUTPATIENT)
Dept: LAB | Age: 73
End: 2018-10-31
Attending: INTERNAL MEDICINE
Payer: MEDICARE

## 2018-10-31 DIAGNOSIS — Z79.4 TYPE 2 DIABETES MELLITUS WITHOUT COMPLICATION, WITH LONG-TERM CURRENT USE OF INSULIN (HCC): ICD-10-CM

## 2018-10-31 DIAGNOSIS — E11.9 TYPE 2 DIABETES MELLITUS WITHOUT COMPLICATION, WITH LONG-TERM CURRENT USE OF INSULIN (HCC): ICD-10-CM

## 2018-10-31 PROCEDURE — 83036 HEMOGLOBIN GLYCOSYLATED A1C: CPT

## 2018-10-31 PROCEDURE — 36415 COLL VENOUS BLD VENIPUNCTURE: CPT

## 2018-11-07 ENCOUNTER — OFFICE VISIT (OUTPATIENT)
Dept: INTERNAL MEDICINE CLINIC | Facility: CLINIC | Age: 73
End: 2018-11-07
Payer: MEDICARE

## 2018-11-07 VITALS
SYSTOLIC BLOOD PRESSURE: 134 MMHG | OXYGEN SATURATION: 100 % | TEMPERATURE: 99 F | BODY MASS INDEX: 25.84 KG/M2 | WEIGHT: 144 LBS | HEART RATE: 75 BPM | HEIGHT: 62.75 IN | DIASTOLIC BLOOD PRESSURE: 60 MMHG

## 2018-11-07 DIAGNOSIS — E11.9 TYPE 2 DIABETES MELLITUS WITHOUT COMPLICATION, WITHOUT LONG-TERM CURRENT USE OF INSULIN (HCC): Primary | ICD-10-CM

## 2018-11-07 DIAGNOSIS — E78.2 HYPERLIPIDEMIA, MIXED: ICD-10-CM

## 2018-11-07 DIAGNOSIS — I10 ESSENTIAL HYPERTENSION: ICD-10-CM

## 2018-11-07 PROCEDURE — 99213 OFFICE O/P EST LOW 20 MIN: CPT | Performed by: INTERNAL MEDICINE

## 2018-11-07 PROCEDURE — G0463 HOSPITAL OUTPT CLINIC VISIT: HCPCS | Performed by: INTERNAL MEDICINE

## 2018-11-07 NOTE — PROGRESS NOTES
Sherie Franks is a 74year old female who presents for     Check at 2.5 mo.     Diabetes: pt notes sugars are \"good. \"  sugars are 60s to 130s. Doing ok w lantus injections, januvia, metformin, amaryl.     Home BPs are 124-129/62 to 70.  124/62 at [facility] in 7/2018  Atorvastatin                Comment:Other reaction(s): ears ring    Review of systems:  Constitutional:  No fever, loss of appetite or unintentional weight loss  Respiratory: No cough, wheezing or dyspnea  Cardiac: No chest pain or palpitations 3/9/18-Dr Martínez-diverticulosis, 3 adenomatous polyps, lipoma hepatic flexture. Next at 3 yrs. Vaccines: zostavax 9/13--disc shingrix at 7/23/18 visit,  DT 8/10. pneumovax 4/13--PCV13 in 4/15. Flu shot 10/2/18.      Lifeline screening 9/26/18--carotids -mil MetFORMIN HCl (GLUCOPHAGE) 1000 MG Oral Tab Take 1 tablet (1,000 mg total) by mouth 2 (two) times daily with meals. Disp: 180 tablet Rfl: 3   simvastatin (ZOCOR) 5 MG Oral Tab Take 1 tablet (5 mg total) by mouth daily.  Disp: 90 tablet Rfl: 3   LUMIGAN 0.

## 2018-11-18 ENCOUNTER — TELEPHONE (OUTPATIENT)
Dept: INTERNAL MEDICINE CLINIC | Facility: CLINIC | Age: 73
End: 2018-11-18

## 2018-11-18 RX ORDER — BLOOD-GLUCOSE METER
KIT MISCELLANEOUS
Qty: 100 STRIP | Refills: 3 | Status: SHIPPED | OUTPATIENT
Start: 2018-11-18 | End: 2018-11-29

## 2018-11-29 RX ORDER — BLOOD-GLUCOSE METER
KIT MISCELLANEOUS
Qty: 300 STRIP | Refills: 3 | Status: SHIPPED | OUTPATIENT
Start: 2018-11-29 | End: 2018-12-03

## 2018-11-29 NOTE — TELEPHONE ENCOUNTER
Spoke with [facility] - need dx on insulin supplies for medicare to cover. Rx amended and refaxed.

## 2018-11-29 NOTE — TELEPHONE ENCOUNTER
Pt went to her pharmacy and was told some information is missing for her refill dont see any calls or fax for this please advise

## 2018-11-30 NOTE — TELEPHONE ENCOUNTER
Per patient -   Diagnosis was received but [facility] still cannot bill diabetic supplies to her insurance   Patient is not sure what else is needed    Please [facility] to follow up

## 2018-12-03 RX ORDER — BLOOD-GLUCOSE METER
KIT MISCELLANEOUS
Qty: 100 STRIP | Refills: 3 | Status: SHIPPED | OUTPATIENT
Start: 2018-12-03 | End: 2020-03-10

## 2018-12-03 NOTE — TELEPHONE ENCOUNTER
[facility] [place] [facility], requesting specific directions for Freestyle Lite Test Strips  Form placed in purple folder  Tasked to RX

## 2018-12-03 NOTE — TELEPHONE ENCOUNTER
LM for pt to call back---we need to know how often is she testing ;   Last Rx for once daily and most recent as directed but increased qty

## 2019-03-22 DIAGNOSIS — E11.9 TYPE 2 DIABETES MELLITUS WITHOUT COMPLICATION, WITHOUT LONG-TERM CURRENT USE OF INSULIN (HCC): ICD-10-CM

## 2019-03-22 DIAGNOSIS — E78.2 HYPERLIPIDEMIA, MIXED: ICD-10-CM

## 2019-03-22 DIAGNOSIS — R82.90 ABNORMAL URINALYSIS: ICD-10-CM

## 2019-03-22 DIAGNOSIS — N39.0 ACUTE UTI: ICD-10-CM

## 2019-03-22 DIAGNOSIS — I10 ESSENTIAL HYPERTENSION: ICD-10-CM

## 2019-03-22 RX ORDER — GLIMEPIRIDE 4 MG/1
TABLET ORAL
Qty: 180 TABLET | Refills: 3 | Status: SHIPPED | OUTPATIENT
Start: 2019-03-22 | End: 2020-01-22

## 2019-03-25 ENCOUNTER — HOSPITAL ENCOUNTER (OUTPATIENT)
Dept: CT IMAGING | Age: 74
Discharge: HOME | End: 2019-03-25
Attending: INTERNAL MEDICINE

## 2019-03-25 DIAGNOSIS — Z13.9 SPECIAL SCREENING: ICD-10-CM

## 2019-03-25 NOTE — PROGRESS NOTES
Pt seen at [facility], [facility]:  PRELIMINARY SCORE= 518.0  BP= 122/58  Cholestec labs as follows: Fasting  TC= 144  HDL= 39  LDL= 60  TG= 226  GLUCOSE= 164 (DM Type II)    All results and risk factors discussed with patient; all questions and concerns

## 2019-03-27 ENCOUNTER — LAB ENCOUNTER (OUTPATIENT)
Dept: LAB | Age: 74
End: 2019-03-27
Attending: INTERNAL MEDICINE
Payer: MEDICARE

## 2019-03-27 ENCOUNTER — TELEPHONE (OUTPATIENT)
Dept: INTERNAL MEDICINE CLINIC | Facility: CLINIC | Age: 74
End: 2019-03-27

## 2019-03-27 DIAGNOSIS — E11.9 TYPE 2 DIABETES MELLITUS WITHOUT COMPLICATION, WITHOUT LONG-TERM CURRENT USE OF INSULIN (HCC): ICD-10-CM

## 2019-03-27 LAB
ALBUMIN SERPL-MCNC: 4.2 G/DL (ref 3.4–5)
ALBUMIN/GLOB SERPL: 1.4 {RATIO} (ref 1–2)
ALP LIVER SERPL-CCNC: 80 U/L (ref 55–142)
ALT SERPL-CCNC: 21 U/L (ref 13–56)
ANION GAP SERPL CALC-SCNC: 9 MMOL/L (ref 0–18)
AST SERPL-CCNC: 12 U/L (ref 15–37)
BASOPHILS # BLD AUTO: 0.05 X10(3) UL (ref 0–0.2)
BASOPHILS NFR BLD AUTO: 0.6 %
BILIRUB SERPL-MCNC: 0.6 MG/DL (ref 0.1–2)
BILIRUB UR QL: NEGATIVE
BUN BLD-MCNC: 27 MG/DL (ref 7–18)
BUN/CREAT SERPL: 22.1 (ref 10–20)
CALCIUM BLD-MCNC: 9.5 MG/DL (ref 8.5–10.1)
CHLORIDE SERPL-SCNC: 104 MMOL/L (ref 98–107)
CHOLEST SMN-MCNC: 127 MG/DL (ref ?–200)
CLARITY UR: CLEAR
CO2 SERPL-SCNC: 25 MMOL/L (ref 21–32)
COLOR UR: YELLOW
CREAT BLD-MCNC: 1.22 MG/DL (ref 0.55–1.02)
CREAT UR-SCNC: 154 MG/DL
DEPRECATED RDW RBC AUTO: 43.2 FL (ref 35.1–46.3)
EOSINOPHIL # BLD AUTO: 0.77 X10(3) UL (ref 0–0.7)
EOSINOPHIL NFR BLD AUTO: 9.2 %
ERYTHROCYTE [DISTWIDTH] IN BLOOD BY AUTOMATED COUNT: 13 % (ref 11–15)
EST. AVERAGE GLUCOSE BLD GHB EST-MCNC: 166 MG/DL (ref 68–126)
GLOBULIN PLAS-MCNC: 3 G/DL (ref 2.8–4.4)
GLUCOSE BLD-MCNC: 186 MG/DL (ref 70–99)
GLUCOSE UR-MCNC: NEGATIVE MG/DL
HBA1C MFR BLD HPLC: 7.4 % (ref ?–5.7)
HCT VFR BLD AUTO: 35.6 % (ref 35–48)
HDLC SERPL-MCNC: 40 MG/DL (ref 40–59)
HGB BLD-MCNC: 11.8 G/DL (ref 12–16)
HGB UR QL STRIP.AUTO: NEGATIVE
IMM GRANULOCYTES # BLD AUTO: 0.03 X10(3) UL (ref 0–1)
IMM GRANULOCYTES NFR BLD: 0.4 %
KETONES UR-MCNC: NEGATIVE MG/DL
LDLC SERPL CALC-MCNC: 40 MG/DL (ref ?–100)
LYMPHOCYTES # BLD AUTO: 1.47 X10(3) UL (ref 1–4)
LYMPHOCYTES NFR BLD AUTO: 17.5 %
M PROTEIN MFR SERPL ELPH: 7.2 G/DL (ref 6.4–8.2)
MCH RBC QN AUTO: 30.4 PG (ref 26–34)
MCHC RBC AUTO-ENTMCNC: 33.1 G/DL (ref 31–37)
MCV RBC AUTO: 91.8 FL (ref 80–100)
MICROALBUMIN UR-MCNC: 10.4 MG/DL
MICROALBUMIN/CREAT 24H UR-RTO: 67.5 UG/MG (ref ?–30)
MONOCYTES # BLD AUTO: 0.54 X10(3) UL (ref 0.1–1)
MONOCYTES NFR BLD AUTO: 6.4 %
NEUTROPHILS # BLD AUTO: 5.53 X10 (3) UL (ref 1.5–7.7)
NEUTROPHILS # BLD AUTO: 5.53 X10(3) UL (ref 1.5–7.7)
NEUTROPHILS NFR BLD AUTO: 65.9 %
NITRITE UR QL STRIP.AUTO: NEGATIVE
NONHDLC SERPL-MCNC: 87 MG/DL (ref ?–130)
OSMOLALITY SERPL CALC.SUM OF ELEC: 296 MOSM/KG (ref 275–295)
PH UR: 5 [PH] (ref 5–8)
PLATELET # BLD AUTO: 290 10(3)UL (ref 150–450)
POTASSIUM SERPL-SCNC: 4.5 MMOL/L (ref 3.5–5.1)
PROT UR-MCNC: 30 MG/DL
RBC # BLD AUTO: 3.88 X10(6)UL (ref 3.8–5.3)
RBC #/AREA URNS AUTO: 2 /HPF
SODIUM SERPL-SCNC: 138 MMOL/L (ref 136–145)
SP GR UR STRIP: 1.02 (ref 1–1.03)
TRIGL SERPL-MCNC: 236 MG/DL (ref 30–149)
TSI SER-ACNC: 2.79 MIU/ML (ref 0.36–3.74)
UROBILINOGEN UR STRIP-ACNC: <2
VIT C UR-MCNC: NEGATIVE MG/DL
VLDLC SERPL CALC-MCNC: 47 MG/DL (ref 0–30)
WBC # BLD AUTO: 8.4 X10(3) UL (ref 4–11)
WBC #/AREA URNS AUTO: 35 /HPF

## 2019-03-27 PROCEDURE — 87086 URINE CULTURE/COLONY COUNT: CPT | Performed by: INTERNAL MEDICINE

## 2019-03-27 PROCEDURE — 82043 UR ALBUMIN QUANTITATIVE: CPT

## 2019-03-27 PROCEDURE — 82570 ASSAY OF URINE CREATININE: CPT

## 2019-03-27 PROCEDURE — 87077 CULTURE AEROBIC IDENTIFY: CPT | Performed by: INTERNAL MEDICINE

## 2019-03-27 PROCEDURE — 81001 URINALYSIS AUTO W/SCOPE: CPT | Performed by: INTERNAL MEDICINE

## 2019-03-27 PROCEDURE — 87186 SC STD MICRODIL/AGAR DIL: CPT | Performed by: INTERNAL MEDICINE

## 2019-03-27 PROCEDURE — 80061 LIPID PANEL: CPT

## 2019-03-27 PROCEDURE — 83036 HEMOGLOBIN GLYCOSYLATED A1C: CPT

## 2019-03-27 PROCEDURE — 80053 COMPREHEN METABOLIC PANEL: CPT

## 2019-03-27 PROCEDURE — 36415 COLL VENOUS BLD VENIPUNCTURE: CPT

## 2019-03-27 PROCEDURE — 85025 COMPLETE CBC W/AUTO DIFF WBC: CPT

## 2019-03-27 PROCEDURE — 84443 ASSAY THYROID STIM HORMONE: CPT

## 2019-03-27 NOTE — TELEPHONE ENCOUNTER
To nursing, please tell patient CT heart calcium score done on 3/25/19–results shows an elevated calcium score of 518. This finding is consistent with extensive atherosclerotic plaque with risk for narrowing of her heart artery.   I recommend she go to see

## 2019-03-29 ENCOUNTER — TELEPHONE (OUTPATIENT)
Dept: INTERNAL MEDICINE CLINIC | Facility: CLINIC | Age: 74
End: 2019-03-29

## 2019-03-29 RX ORDER — CIPROFLOXACIN 250 MG/1
250 TABLET, FILM COATED ORAL 2 TIMES DAILY
Qty: 10 TABLET | Refills: 0 | Status: CANCELLED | OUTPATIENT
Start: 2019-03-29

## 2019-03-29 NOTE — TELEPHONE ENCOUNTER
To nursing,   please tell patient lab done on 3/27/19–results are overall ok except elevated A1c 7.4 which I will discuss at her visit on 4/3/19. The urine test shows he has a urinary tract infection. Please send in Rx Cipro 250 mg twice daily #10.     Se

## 2019-04-03 ENCOUNTER — OFFICE VISIT (OUTPATIENT)
Dept: INTERNAL MEDICINE CLINIC | Facility: CLINIC | Age: 74
End: 2019-04-03
Payer: MEDICARE

## 2019-04-03 VITALS
HEART RATE: 78 BPM | WEIGHT: 145 LBS | TEMPERATURE: 98 F | SYSTOLIC BLOOD PRESSURE: 130 MMHG | BODY MASS INDEX: 26.68 KG/M2 | HEIGHT: 62 IN | OXYGEN SATURATION: 99 % | DIASTOLIC BLOOD PRESSURE: 60 MMHG

## 2019-04-03 DIAGNOSIS — R93.1 ELEVATED CORONARY ARTERY CALCIUM SCORE: ICD-10-CM

## 2019-04-03 DIAGNOSIS — R94.31 ABNORMAL EKG: ICD-10-CM

## 2019-04-03 DIAGNOSIS — E11.9 TYPE 2 DIABETES MELLITUS WITHOUT COMPLICATION, WITHOUT LONG-TERM CURRENT USE OF INSULIN (HCC): Primary | ICD-10-CM

## 2019-04-03 DIAGNOSIS — Z12.31 VISIT FOR SCREENING MAMMOGRAM: ICD-10-CM

## 2019-04-03 DIAGNOSIS — I10 ESSENTIAL HYPERTENSION: ICD-10-CM

## 2019-04-03 DIAGNOSIS — N39.0 ACUTE UTI: ICD-10-CM

## 2019-04-03 DIAGNOSIS — E78.2 HYPERLIPIDEMIA, MIXED: ICD-10-CM

## 2019-04-03 PROCEDURE — 93005 ELECTROCARDIOGRAM TRACING: CPT | Performed by: INTERNAL MEDICINE

## 2019-04-03 PROCEDURE — G0463 HOSPITAL OUTPT CLINIC VISIT: HCPCS | Performed by: INTERNAL MEDICINE

## 2019-04-03 PROCEDURE — 99214 OFFICE O/P EST MOD 30 MIN: CPT | Performed by: INTERNAL MEDICINE

## 2019-04-03 PROCEDURE — 93010 ELECTROCARDIOGRAM REPORT: CPT | Performed by: INTERNAL MEDICINE

## 2019-04-03 RX ORDER — HYDROCHLOROTHIAZIDE 50 MG/1
50 TABLET ORAL DAILY
Qty: 90 TABLET | Refills: 3 | Status: SHIPPED | OUTPATIENT
Start: 2019-04-03 | End: 2020-01-22

## 2019-04-03 RX ORDER — CEPHALEXIN 500 MG/1
500 CAPSULE ORAL 2 TIMES DAILY
Qty: 10 CAPSULE | Refills: 0 | Status: SHIPPED | OUTPATIENT
Start: 2019-04-03 | End: 2019-07-17 | Stop reason: ALTCHOICE

## 2019-04-03 RX ORDER — ENALAPRIL MALEATE 20 MG/1
20 TABLET ORAL DAILY
Qty: 90 TABLET | Refills: 3 | Status: SHIPPED | OUTPATIENT
Start: 2019-04-03 | End: 2020-01-22

## 2019-04-03 RX ORDER — AMLODIPINE BESYLATE 10 MG/1
10 TABLET ORAL DAILY
Qty: 90 TABLET | Refills: 3 | Status: SHIPPED | OUTPATIENT
Start: 2019-04-03 | End: 2020-01-22

## 2019-04-03 RX ORDER — SIMVASTATIN 5 MG
5 TABLET ORAL DAILY
Qty: 90 TABLET | Refills: 3 | Status: SHIPPED | OUTPATIENT
Start: 2019-04-03 | End: 2020-01-22

## 2019-04-03 NOTE — PROGRESS NOTES
Daniel Dill is a 74year old female who presents for     Check at 5 mo.     Diabetes: pt notes sugars are 100-160. Doing ok w levimir injections (got levimir --wasn't lantus),  metformin, amaryl.     Home BPs are 120-130/60.     CT heart calcium score 3 appetite or unintentional weight loss  Respiratory: No cough, wheezing or dyspnea  Cardiac: No chest pain or palpitations  Gastrointestinal: No abdominal pain, vomiting, diarrhea or constipation  Genitourinary:  No dysuria or blood in the urine  Dermatolog 9/26/18.     Healthcare maintenance:   Mammo screening 3/5/18-ok. Mammo ord entered 4/3/19. dexa-3/5/18-nl. Pap neg 4/13 here.    colonoscopy 3/9/18-Dr Martínez-diverticulosis, 3 mark polyps, lipoma hepatic flexture. Next 3 yrs.   Vaccines: zostavax 9/13-- MORNING BEFORE       BREAKFAST Disp: 180 tablet Rfl: 3   Glucose Blood (FREESTYLE LITE TEST) In Vitro Strip Test Blood Sugar Once Daily DX E 11.9 Insulin Dependent Disp: 100 strip Rfl: 3   Insulin Pen Needle (PEN NEEDLES) 31G X 6 MM Does not apply Misc Use

## 2019-04-11 ENCOUNTER — HOSPITAL ENCOUNTER (OUTPATIENT)
Dept: NUCLEAR MEDICINE | Facility: HOSPITAL | Age: 74
Discharge: HOME | End: 2019-04-11
Attending: INTERNAL MEDICINE
Payer: MEDICARE

## 2019-04-11 ENCOUNTER — TELEPHONE (OUTPATIENT)
Dept: INTERNAL MEDICINE CLINIC | Facility: CLINIC | Age: 74
End: 2019-04-11

## 2019-04-11 ENCOUNTER — HOSPITAL ENCOUNTER (OUTPATIENT)
Dept: CV DIAGNOSTICS | Facility: HOSPITAL | Age: 74
Discharge: HOME | End: 2019-04-11
Attending: INTERNAL MEDICINE
Payer: MEDICARE

## 2019-04-11 DIAGNOSIS — R93.1 ELEVATED CORONARY ARTERY CALCIUM SCORE: ICD-10-CM

## 2019-04-11 DIAGNOSIS — R94.31 ABNORMAL EKG: ICD-10-CM

## 2019-04-11 PROCEDURE — 93016 CV STRESS TEST SUPVJ ONLY: CPT | Performed by: INTERNAL MEDICINE

## 2019-04-11 PROCEDURE — 93018 CV STRESS TEST I&R ONLY: CPT | Performed by: INTERNAL MEDICINE

## 2019-04-11 PROCEDURE — 93017 CV STRESS TEST TRACING ONLY: CPT | Performed by: INTERNAL MEDICINE

## 2019-04-11 PROCEDURE — 78452 HT MUSCLE IMAGE SPECT MULT: CPT | Performed by: INTERNAL MEDICINE

## 2019-04-11 RX ORDER — 0.9 % SODIUM CHLORIDE 0.9 %
VIAL (ML) INJECTION
Status: COMPLETED
Start: 2019-04-11 | End: 2019-04-11

## 2019-04-11 RX ADMIN — 0.9 % SODIUM CHLORIDE 10 ML: 0.9 % VIAL (ML) INJECTION at 13:50:00

## 2019-04-11 NOTE — TELEPHONE ENCOUNTER
To nursing, please tell pt 4/11/19 lexiscan stress test is normal. See Dr Paulie Rivera for the elevated calcium score as we discussed. Thanks.

## 2019-04-11 NOTE — TELEPHONE ENCOUNTER
Spoke to patient and relayed MD message, patient verbalized understanding and agreed to follow up with Dr. Geoff Shah. Provided patient with contact information to Dr. Dmitry Antunez office.

## 2019-05-08 ENCOUNTER — HOSPITAL ENCOUNTER (OUTPATIENT)
Dept: MAMMOGRAPHY | Facility: HOSPITAL | Age: 74
Discharge: HOME | End: 2019-05-08
Attending: INTERNAL MEDICINE
Payer: MEDICARE

## 2019-05-08 DIAGNOSIS — Z12.31 VISIT FOR SCREENING MAMMOGRAM: ICD-10-CM

## 2019-05-08 PROCEDURE — 77063 BREAST TOMOSYNTHESIS BI: CPT | Performed by: INTERNAL MEDICINE

## 2019-05-08 PROCEDURE — 77067 SCR MAMMO BI INCL CAD: CPT | Performed by: INTERNAL MEDICINE

## 2019-05-10 ENCOUNTER — TELEPHONE (OUTPATIENT)
Dept: INTERNAL MEDICINE CLINIC | Facility: CLINIC | Age: 74
End: 2019-05-10

## 2019-05-10 NOTE — TELEPHONE ENCOUNTER
To nursing, please tell patient mammogram done 5/8/19 shows a density in the left breast the radiologist will be calling her back for some additional views. It is common for them to call patients back. Thanks.     Note to self–  5/18/19–mammo laura --focal

## 2019-05-14 ENCOUNTER — TELEPHONE (OUTPATIENT)
Dept: INTERNAL MEDICINE CLINIC | Facility: CLINIC | Age: 74
End: 2019-05-14

## 2019-05-14 NOTE — TELEPHONE ENCOUNTER
[facility] requesting diabetic testing supplies   Placed in blue folder   Tasked to rx

## 2019-05-16 NOTE — TELEPHONE ENCOUNTER
Form completed and faxed back to [facility], conformation received. Original sent to scan.

## 2019-05-22 ENCOUNTER — TELEPHONE (OUTPATIENT)
Dept: INTERNAL MEDICINE CLINIC | Facility: CLINIC | Age: 74
End: 2019-05-22

## 2019-05-22 ENCOUNTER — HOSPITAL ENCOUNTER (OUTPATIENT)
Dept: MAMMOGRAPHY | Facility: HOSPITAL | Age: 74
Discharge: HOME | End: 2019-05-22
Attending: INTERNAL MEDICINE
Payer: MEDICARE

## 2019-05-22 ENCOUNTER — HOSPITAL ENCOUNTER (OUTPATIENT)
Dept: ULTRASOUND IMAGING | Facility: HOSPITAL | Age: 74
Discharge: HOME | End: 2019-05-22
Attending: INTERNAL MEDICINE
Payer: MEDICARE

## 2019-05-22 DIAGNOSIS — R92.8 ABNORMAL MAMMOGRAM: ICD-10-CM

## 2019-05-22 PROCEDURE — 76642 ULTRASOUND BREAST LIMITED: CPT | Performed by: INTERNAL MEDICINE

## 2019-05-22 PROCEDURE — 77065 DX MAMMO INCL CAD UNI: CPT | Performed by: INTERNAL MEDICINE

## 2019-05-22 PROCEDURE — 77061 BREAST TOMOSYNTHESIS UNI: CPT | Performed by: INTERNAL MEDICINE

## 2019-05-22 NOTE — TELEPHONE ENCOUNTER
To nursing, please tell patient additional views left breast mammogram and ultrasound on 5/22/2019–results are okay. The radiologist said to do next screening mammogram in one year--May 2020. Thanks.

## 2019-05-23 NOTE — TELEPHONE ENCOUNTER
Called patient and relayed Dr China Tolentino message on VM per hipaa. Relayed to call back if any additional questions or concerns.

## 2019-06-10 ENCOUNTER — OFFICE VISIT (OUTPATIENT)
Dept: CARDIOLOGY CLINIC | Facility: CLINIC | Age: 74
End: 2019-06-10
Payer: MEDICARE

## 2019-06-10 VITALS
SYSTOLIC BLOOD PRESSURE: 129 MMHG | OXYGEN SATURATION: 98 % | BODY MASS INDEX: 27 KG/M2 | DIASTOLIC BLOOD PRESSURE: 68 MMHG | RESPIRATION RATE: 20 BRPM | HEART RATE: 75 BPM | WEIGHT: 147 LBS

## 2019-06-10 DIAGNOSIS — R93.1 ELEVATED CORONARY ARTERY CALCIUM SCORE: Primary | ICD-10-CM

## 2019-06-10 DIAGNOSIS — E78.00 PURE HYPERCHOLESTEROLEMIA: ICD-10-CM

## 2019-06-10 PROCEDURE — G0463 HOSPITAL OUTPT CLINIC VISIT: HCPCS | Performed by: INTERNAL MEDICINE

## 2019-06-10 PROCEDURE — 99204 OFFICE O/P NEW MOD 45 MIN: CPT | Performed by: INTERNAL MEDICINE

## 2019-06-10 NOTE — PROGRESS NOTES
Name:  Lorena Muro  : 1945    Date of consultation:   6/10/2019    Referring physician: Cedric Murphy MD    Reason for Visit:  Patient presents with:  Consult: Referred by Dr. Keyon Sevilla, here to discuss CT Calcium Score  Hypertension  Hyper Solution Place 1 drop into both eyes daily. Disp:  Rfl:    aspirin 81 MG Oral Tab Take 1 tablet by mouth daily. Disp:  Rfl:    cephALEXin (KEFLEX) 500 MG Oral Cap Take 1 capsule (500 mg total) by mouth 2 (two) times daily.  Disp: 10 capsule Rfl: 0     Famil murmur, no rub  ABD: soft, nontender, nondistended, normal bowel sounds  EXTREMITIES: no cyanosis or edema  SKIN: warm, dry, normal turgor  NEURO: alert, oriented x3, symmetrical face, no dysarthria, no focal deficits  PSYCH: cooperative, calm    LABS:

## 2019-07-11 ENCOUNTER — APPOINTMENT (OUTPATIENT)
Dept: LAB | Age: 74
End: 2019-07-11
Attending: INTERNAL MEDICINE
Payer: MEDICARE

## 2019-07-11 DIAGNOSIS — E11.9 TYPE 2 DIABETES MELLITUS WITHOUT COMPLICATION, WITHOUT LONG-TERM CURRENT USE OF INSULIN (HCC): ICD-10-CM

## 2019-07-11 LAB
EST. AVERAGE GLUCOSE BLD GHB EST-MCNC: 154 MG/DL (ref 68–126)
HBA1C MFR BLD HPLC: 7 % (ref ?–5.7)

## 2019-07-11 PROCEDURE — 83036 HEMOGLOBIN GLYCOSYLATED A1C: CPT

## 2019-07-11 PROCEDURE — 36415 COLL VENOUS BLD VENIPUNCTURE: CPT

## 2019-07-17 ENCOUNTER — OFFICE VISIT (OUTPATIENT)
Dept: INTERNAL MEDICINE CLINIC | Facility: CLINIC | Age: 74
End: 2019-07-17
Payer: MEDICARE

## 2019-07-17 VITALS
WEIGHT: 143 LBS | DIASTOLIC BLOOD PRESSURE: 60 MMHG | SYSTOLIC BLOOD PRESSURE: 130 MMHG | HEART RATE: 75 BPM | OXYGEN SATURATION: 98 % | TEMPERATURE: 98 F | BODY MASS INDEX: 26 KG/M2

## 2019-07-17 DIAGNOSIS — I10 ESSENTIAL HYPERTENSION: ICD-10-CM

## 2019-07-17 DIAGNOSIS — R93.1 ELEVATED CORONARY ARTERY CALCIUM SCORE: ICD-10-CM

## 2019-07-17 DIAGNOSIS — E11.9 TYPE 2 DIABETES MELLITUS WITHOUT COMPLICATION, WITHOUT LONG-TERM CURRENT USE OF INSULIN (HCC): Primary | ICD-10-CM

## 2019-07-17 DIAGNOSIS — S90.421A BLISTER OF GREAT TOE OF RIGHT FOOT, INITIAL ENCOUNTER: ICD-10-CM

## 2019-07-17 PROCEDURE — 99214 OFFICE O/P EST MOD 30 MIN: CPT | Performed by: INTERNAL MEDICINE

## 2019-07-17 PROCEDURE — G0463 HOSPITAL OUTPT CLINIC VISIT: HCPCS | Performed by: INTERNAL MEDICINE

## 2019-07-17 NOTE — PROGRESS NOTES
Daniel Dill is a 74year old female who presents for     Check at 3 mo. 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 Has blisters on R gr toe and top of R 4th toe.    Used \"cover the [employer] pad.      Diabetes: pt notes sugars are 113-1 Comment:Nausea and vomiting in 7/2018  Atorvastatin                Comment:Other reaction(s): ears ring    Review of systems:  Constitutional:  No fever, loss of appetite or unintentional weight loss  Respiratory: No cough, wheezing or dyspnea  Cardiac: No further eval needed. LBBB -LBBB on EKG of 5/19/14 visit-stress echo per Dr. Roberto Gutierrez 6/11/14- nl.   Carotid bruits--bilat.  Lifeline screening 10/3/17-carotids mild bilat--> mild bilat carotids on Lifeline 9/26/18.     Healthcare maintenance:   5/18/19–mamm Strip Test Blood Sugar Once Daily DX E 11.9 Insulin Dependent Disp: 100 strip Rfl: 3   Insulin Pen Needle (PEN NEEDLES) 31G X 6 MM Does not apply Misc Use daily with insulin Disp: 100 each Rfl: 1   LUMIGAN 0.01 % Ophthalmic Solution Place 1 drop into both

## 2019-10-10 ENCOUNTER — APPOINTMENT (OUTPATIENT)
Dept: LAB | Age: 74
End: 2019-10-10
Attending: INTERNAL MEDICINE
Payer: MEDICARE

## 2019-10-10 DIAGNOSIS — E11.9 TYPE 2 DIABETES MELLITUS WITHOUT COMPLICATION, WITHOUT LONG-TERM CURRENT USE OF INSULIN (HCC): ICD-10-CM

## 2019-10-10 PROCEDURE — 80061 LIPID PANEL: CPT

## 2019-10-10 PROCEDURE — 80053 COMPREHEN METABOLIC PANEL: CPT

## 2019-10-10 PROCEDURE — 83036 HEMOGLOBIN GLYCOSYLATED A1C: CPT

## 2019-10-10 PROCEDURE — 36415 COLL VENOUS BLD VENIPUNCTURE: CPT

## 2019-10-16 ENCOUNTER — OFFICE VISIT (OUTPATIENT)
Dept: INTERNAL MEDICINE CLINIC | Facility: CLINIC | Age: 74
End: 2019-10-16
Payer: MEDICARE

## 2019-10-16 VITALS
BODY MASS INDEX: 26.68 KG/M2 | TEMPERATURE: 98 F | WEIGHT: 145 LBS | HEART RATE: 84 BPM | HEIGHT: 62 IN | DIASTOLIC BLOOD PRESSURE: 66 MMHG | SYSTOLIC BLOOD PRESSURE: 148 MMHG

## 2019-10-16 DIAGNOSIS — E11.9 TYPE 2 DIABETES MELLITUS WITHOUT COMPLICATION, WITHOUT LONG-TERM CURRENT USE OF INSULIN (HCC): Primary | ICD-10-CM

## 2019-10-16 DIAGNOSIS — E78.2 HYPERLIPIDEMIA, MIXED: ICD-10-CM

## 2019-10-16 DIAGNOSIS — N18.30 STAGE 3 CHRONIC KIDNEY DISEASE (HCC): ICD-10-CM

## 2019-10-16 DIAGNOSIS — R93.1 ELEVATED CORONARY ARTERY CALCIUM SCORE: ICD-10-CM

## 2019-10-16 DIAGNOSIS — I10 ESSENTIAL HYPERTENSION: ICD-10-CM

## 2019-10-16 PROCEDURE — 90662 IIV NO PRSV INCREASED AG IM: CPT | Performed by: INTERNAL MEDICINE

## 2019-10-16 PROCEDURE — G0008 ADMIN INFLUENZA VIRUS VAC: HCPCS | Performed by: INTERNAL MEDICINE

## 2019-10-16 PROCEDURE — 99214 OFFICE O/P EST MOD 30 MIN: CPT | Performed by: INTERNAL MEDICINE

## 2019-10-16 PROCEDURE — G0463 HOSPITAL OUTPT CLINIC VISIT: HCPCS | Performed by: INTERNAL MEDICINE

## 2019-10-16 NOTE — PROGRESS NOTES
Moe Herrera is a 74year old female who presents for     Check at 3 mo. R foot blister healed.       Diabetes: pt notes sugars are 120-140. On levimir 8 units/d, metformin, amaryl. Walks \"at least a mile a day. \"  Sees Dr Brenda Bruossard.  He rx steveigan for reaction(s): ears ring    Review of systems:  Constitutional:  No fever, loss of appetite or unintentional weight loss  Respiratory: No cough, wheezing or dyspnea  Cardiac: No chest pain or palpitations  Gastrointestinal: No abdominal pain, vomiting, diarr --focal asymmetry L breast–L addl views 5/22/19-ok. Next at 1 yr. dexa-3/5/18-nl. Pap neg 4/13 here.    colonoscopy 3/9/18-Dr Martínez-diverticulosis, 3 mark polyps, lipoma hepatic flexture. Next 3 yrs.   Vacc: zstvx 9/13, shingrix disc 7/23/18, DT 8/10. pmvx Pen Needle (PEN NEEDLES) 31G X 6 MM Does not apply Misc, Use daily with insulin, Disp: 100 each, Rfl: 1  LUMIGAN 0.01 % Ophthalmic Solution, Place 1 drop into both eyes daily. , Disp: , Rfl:   aspirin 81 MG Oral Tab, Take 1 tablet by mouth daily. , Disp: , R

## 2020-01-15 ENCOUNTER — APPOINTMENT (OUTPATIENT)
Dept: LAB | Age: 75
End: 2020-01-15
Attending: INTERNAL MEDICINE
Payer: MEDICARE

## 2020-01-15 DIAGNOSIS — E11.9 TYPE 2 DIABETES MELLITUS WITHOUT COMPLICATION, WITHOUT LONG-TERM CURRENT USE OF INSULIN (HCC): ICD-10-CM

## 2020-01-15 LAB
ALBUMIN SERPL-MCNC: 4.1 G/DL (ref 3.4–5)
ALBUMIN/GLOB SERPL: 1.2 {RATIO} (ref 1–2)
ALP LIVER SERPL-CCNC: 69 U/L (ref 55–142)
ALT SERPL-CCNC: 21 U/L (ref 13–56)
ANION GAP SERPL CALC-SCNC: 11 MMOL/L (ref 0–18)
AST SERPL-CCNC: 19 U/L (ref 15–37)
BILIRUB SERPL-MCNC: 0.6 MG/DL (ref 0.1–2)
BUN BLD-MCNC: 25 MG/DL (ref 7–18)
BUN/CREAT SERPL: 19.7 (ref 10–20)
CALCIUM BLD-MCNC: 9.7 MG/DL (ref 8.5–10.1)
CHLORIDE SERPL-SCNC: 103 MMOL/L (ref 98–112)
CHOLEST SMN-MCNC: 151 MG/DL (ref ?–200)
CO2 SERPL-SCNC: 24 MMOL/L (ref 21–32)
CREAT BLD-MCNC: 1.27 MG/DL (ref 0.55–1.02)
EST. AVERAGE GLUCOSE BLD GHB EST-MCNC: 189 MG/DL (ref 68–126)
GLOBULIN PLAS-MCNC: 3.3 G/DL (ref 2.8–4.4)
GLUCOSE BLD-MCNC: 203 MG/DL (ref 70–99)
HBA1C MFR BLD HPLC: 8.2 % (ref ?–5.7)
HDLC SERPL-MCNC: 40 MG/DL (ref 40–59)
LDLC SERPL CALC-MCNC: 41 MG/DL (ref ?–100)
M PROTEIN MFR SERPL ELPH: 7.4 G/DL (ref 6.4–8.2)
NONHDLC SERPL-MCNC: 111 MG/DL (ref ?–130)
OSMOLALITY SERPL CALC.SUM OF ELEC: 296 MOSM/KG (ref 275–295)
PATIENT FASTING Y/N/NP: YES
PATIENT FASTING Y/N/NP: YES
POTASSIUM SERPL-SCNC: 4.5 MMOL/L (ref 3.5–5.1)
SODIUM SERPL-SCNC: 138 MMOL/L (ref 136–145)
TRIGL SERPL-MCNC: 351 MG/DL (ref 30–149)
VLDLC SERPL CALC-MCNC: 70 MG/DL (ref 0–30)

## 2020-01-15 PROCEDURE — 80053 COMPREHEN METABOLIC PANEL: CPT

## 2020-01-15 PROCEDURE — 36415 COLL VENOUS BLD VENIPUNCTURE: CPT

## 2020-01-15 PROCEDURE — 83036 HEMOGLOBIN GLYCOSYLATED A1C: CPT

## 2020-01-15 PROCEDURE — 80061 LIPID PANEL: CPT

## 2020-01-22 ENCOUNTER — OFFICE VISIT (OUTPATIENT)
Dept: INTERNAL MEDICINE CLINIC | Facility: CLINIC | Age: 75
End: 2020-01-22
Payer: MEDICARE

## 2020-01-22 VITALS
HEIGHT: 62 IN | DIASTOLIC BLOOD PRESSURE: 60 MMHG | TEMPERATURE: 99 F | BODY MASS INDEX: 26.68 KG/M2 | HEART RATE: 80 BPM | WEIGHT: 145 LBS | SYSTOLIC BLOOD PRESSURE: 130 MMHG

## 2020-01-22 DIAGNOSIS — E11.9 TYPE 2 DIABETES MELLITUS WITHOUT COMPLICATION, WITHOUT LONG-TERM CURRENT USE OF INSULIN (HCC): Primary | ICD-10-CM

## 2020-01-22 DIAGNOSIS — N18.30 STAGE 3 CHRONIC KIDNEY DISEASE (HCC): ICD-10-CM

## 2020-01-22 DIAGNOSIS — R93.1 ELEVATED CORONARY ARTERY CALCIUM SCORE: ICD-10-CM

## 2020-01-22 DIAGNOSIS — I10 ESSENTIAL HYPERTENSION: ICD-10-CM

## 2020-01-22 DIAGNOSIS — E78.2 HYPERLIPIDEMIA, MIXED: ICD-10-CM

## 2020-01-22 PROCEDURE — G0463 HOSPITAL OUTPT CLINIC VISIT: HCPCS | Performed by: INTERNAL MEDICINE

## 2020-01-22 PROCEDURE — 99214 OFFICE O/P EST MOD 30 MIN: CPT | Performed by: INTERNAL MEDICINE

## 2020-01-22 RX ORDER — GLIMEPIRIDE 4 MG/1
TABLET ORAL
Qty: 180 TABLET | Refills: 3 | Status: SHIPPED | OUTPATIENT
Start: 2020-01-22 | End: 2021-03-08

## 2020-01-22 RX ORDER — ENALAPRIL MALEATE 20 MG/1
20 TABLET ORAL DAILY
Qty: 90 TABLET | Refills: 3 | Status: SHIPPED | OUTPATIENT
Start: 2020-01-22 | End: 2021-01-26

## 2020-01-22 RX ORDER — SIMVASTATIN 5 MG
5 TABLET ORAL DAILY
Qty: 90 TABLET | Refills: 3 | Status: SHIPPED | OUTPATIENT
Start: 2020-01-22 | End: 2021-01-26

## 2020-01-22 RX ORDER — AMLODIPINE BESYLATE 10 MG/1
10 TABLET ORAL DAILY
Qty: 90 TABLET | Refills: 3 | Status: SHIPPED | OUTPATIENT
Start: 2020-01-22 | End: 2020-11-09

## 2020-01-22 RX ORDER — HYDROCHLOROTHIAZIDE 50 MG/1
50 TABLET ORAL DAILY
Qty: 90 TABLET | Refills: 3 | Status: SHIPPED | OUTPATIENT
Start: 2020-01-22 | End: 2020-07-29

## 2020-01-22 NOTE — PROGRESS NOTES
Xu Frederick is a 66year old female who presents for     Check at 3 mo. \"I feel good. \"     Diabetes: pt notes sugars are \"running between 100 and 200. \"  Off diet for the holidays. Ate fruit cakes over [place].    rosy incr levimir 10 units/d--an vomiting in 7/2018  Atorvastatin                Comment:Other reaction(s): ears ring    Review of systems:  Constitutional:  No fever, loss of appetite or unintentional weight loss  Respiratory: No cough, wheezing or dyspnea  Cardiac: No chest pain or palp bruits--bilat. Lifeline screening 10/3/17-carotids mild bilat--> mild bilat carotids on Lifeline 9/26/18.     Healthcare maintenance:   5/18/19–mammo--focal asymmetry L–L addl views 5/22/19-ok. Next at 1 yr. dexa-3/5/18-nl.   Pap neg 4/13 here.    colonosco tablet 3   • amLODIPine Besylate (NORVASC) 10 MG Oral Tab Take 1 tablet (10 mg total) by mouth daily. 90 tablet 3   • insulin detemir (LEVEMIR FLEXTOUCH) 100 UNIT/ML Subcutaneous Solution Pen-injector Inject 16 Units into the skin daily.  15 mL 1   • Glucos

## 2020-03-03 DIAGNOSIS — I10 ESSENTIAL HYPERTENSION: ICD-10-CM

## 2020-03-03 DIAGNOSIS — E78.2 HYPERLIPIDEMIA, MIXED: ICD-10-CM

## 2020-03-03 DIAGNOSIS — E11.9 TYPE 2 DIABETES MELLITUS WITHOUT COMPLICATION, WITHOUT LONG-TERM CURRENT USE OF INSULIN (HCC): ICD-10-CM

## 2020-03-03 NOTE — TELEPHONE ENCOUNTER
Pt called re:Levemir refill  [facility]    Please send to RX to pharmacy

## 2020-03-03 NOTE — TELEPHONE ENCOUNTER
Current refill request refused due to refill is either a duplicate request or has active refills at the pharmacy. Check previous templates.     Requested Prescriptions     Refused Prescriptions Disp Refills   • insulin detemir (LEVEMIR FLEXTOUCH) 100 UNIT/

## 2020-03-04 NOTE — TELEPHONE ENCOUNTER
[facility] [place] [facility] for based on new dosage:  \"new RX for Levemir injecting 16 units as per pt\"  Form placed in purple folder  Tasked to [employer]

## 2020-03-04 NOTE — TELEPHONE ENCOUNTER
[facility]. They have Rx on file from 1/22/20 but too eary to fill-not due for dispense 3/13/20.      Notified pt---verbalized understanding

## 2020-03-10 ENCOUNTER — TELEPHONE (OUTPATIENT)
Dept: INTERNAL MEDICINE CLINIC | Facility: CLINIC | Age: 75
End: 2020-03-10

## 2020-03-10 RX ORDER — BLOOD-GLUCOSE METER
KIT MISCELLANEOUS
Qty: 100 STRIP | Refills: 3 | Status: SHIPPED | OUTPATIENT
Start: 2020-03-10 | End: 2020-03-11

## 2020-03-10 NOTE — TELEPHONE ENCOUNTER
Fax received from [facility] [place]  Re Levemir flextouch 100unit/ml  16 units daily    - patient says dose now is 16 units daily; pharmacy never received new RX  In purple folder

## 2020-03-11 RX ORDER — BLOOD-GLUCOSE METER
KIT MISCELLANEOUS
Qty: 100 STRIP | Refills: 3 | Status: SHIPPED | OUTPATIENT
Start: 2020-03-11 | End: 2021-03-09

## 2020-03-11 NOTE — TELEPHONE ENCOUNTER
Patient is calling to see if Dr Lucita Billingsley can give her a hard copy of the below medication. Patient states she is upset that this has been going on since the medication was sent to the pharmacy back in January.  Patient is coming in with her  to do b

## 2020-04-20 RX ORDER — PEN NEEDLE, DIABETIC 31 G X1/4"
NEEDLE, DISPOSABLE MISCELLANEOUS
Qty: 100 EACH | Refills: 1 | Status: SHIPPED | OUTPATIENT
Start: 2020-04-20 | End: 2020-08-23

## 2020-07-08 ENCOUNTER — TELEPHONE (OUTPATIENT)
Dept: INTERNAL MEDICINE CLINIC | Facility: CLINIC | Age: 75
End: 2020-07-08

## 2020-07-08 ENCOUNTER — LAB ENCOUNTER (OUTPATIENT)
Dept: LAB | Age: 75
End: 2020-07-08
Attending: INTERNAL MEDICINE
Payer: MEDICARE

## 2020-07-08 DIAGNOSIS — E11.9 TYPE 2 DIABETES MELLITUS WITHOUT COMPLICATION, WITHOUT LONG-TERM CURRENT USE OF INSULIN (HCC): ICD-10-CM

## 2020-07-08 DIAGNOSIS — E78.2 HYPERLIPIDEMIA, MIXED: Primary | ICD-10-CM

## 2020-07-08 DIAGNOSIS — E78.2 HYPERLIPIDEMIA, MIXED: ICD-10-CM

## 2020-07-08 DIAGNOSIS — R79.89 ELEVATED TSH: ICD-10-CM

## 2020-07-08 LAB
ALBUMIN SERPL-MCNC: 3.9 G/DL (ref 3.4–5)
ALBUMIN/GLOB SERPL: 1.1 {RATIO} (ref 1–2)
ALP LIVER SERPL-CCNC: 73 U/L (ref 55–142)
ALT SERPL-CCNC: 20 U/L (ref 13–56)
ANION GAP SERPL CALC-SCNC: 10 MMOL/L (ref 0–18)
AST SERPL-CCNC: 15 U/L (ref 15–37)
BASOPHILS # BLD AUTO: 0.06 X10(3) UL (ref 0–0.2)
BASOPHILS NFR BLD AUTO: 0.7 %
BILIRUB SERPL-MCNC: 0.5 MG/DL (ref 0.1–2)
BILIRUB UR QL: NEGATIVE
BUN BLD-MCNC: 21 MG/DL (ref 7–18)
BUN/CREAT SERPL: 16.2 (ref 10–20)
CALCIUM BLD-MCNC: 9.3 MG/DL (ref 8.5–10.1)
CHLORIDE SERPL-SCNC: 102 MMOL/L (ref 98–112)
CHOLEST SMN-MCNC: 152 MG/DL (ref ?–200)
CLARITY UR: CLEAR
CO2 SERPL-SCNC: 24 MMOL/L (ref 21–32)
COLOR UR: YELLOW
CREAT BLD-MCNC: 1.3 MG/DL (ref 0.55–1.02)
CREAT UR-SCNC: 51.6 MG/DL
DEPRECATED RDW RBC AUTO: 42.9 FL (ref 35.1–46.3)
EOSINOPHIL # BLD AUTO: 0.42 X10(3) UL (ref 0–0.7)
EOSINOPHIL NFR BLD AUTO: 4.7 %
ERYTHROCYTE [DISTWIDTH] IN BLOOD BY AUTOMATED COUNT: 13.1 % (ref 11–15)
EST. AVERAGE GLUCOSE BLD GHB EST-MCNC: 137 MG/DL (ref 68–126)
GLOBULIN PLAS-MCNC: 3.5 G/DL (ref 2.8–4.4)
GLUCOSE BLD-MCNC: 165 MG/DL (ref 70–99)
GLUCOSE UR-MCNC: NEGATIVE MG/DL
HBA1C MFR BLD HPLC: 6.4 % (ref ?–5.7)
HCT VFR BLD AUTO: 34.5 % (ref 35–48)
HDLC SERPL-MCNC: 42 MG/DL (ref 40–59)
HGB BLD-MCNC: 11.8 G/DL (ref 12–16)
HGB UR QL STRIP.AUTO: NEGATIVE
IMM GRANULOCYTES # BLD AUTO: 0.03 X10(3) UL (ref 0–1)
IMM GRANULOCYTES NFR BLD: 0.3 %
KETONES UR-MCNC: NEGATIVE MG/DL
LDLC SERPL CALC-MCNC: 44 MG/DL (ref ?–100)
LEUKOCYTE ESTERASE UR QL STRIP.AUTO: NEGATIVE
LYMPHOCYTES # BLD AUTO: 1.81 X10(3) UL (ref 1–4)
LYMPHOCYTES NFR BLD AUTO: 20.3 %
M PROTEIN MFR SERPL ELPH: 7.4 G/DL (ref 6.4–8.2)
MCH RBC QN AUTO: 30.9 PG (ref 26–34)
MCHC RBC AUTO-ENTMCNC: 34.2 G/DL (ref 31–37)
MCV RBC AUTO: 90.3 FL (ref 80–100)
MICROALBUMIN UR-MCNC: 1.31 MG/DL
MICROALBUMIN/CREAT 24H UR-RTO: 25.4 UG/MG (ref ?–30)
MONOCYTES # BLD AUTO: 0.6 X10(3) UL (ref 0.1–1)
MONOCYTES NFR BLD AUTO: 6.7 %
NEUTROPHILS # BLD AUTO: 6 X10 (3) UL (ref 1.5–7.7)
NEUTROPHILS # BLD AUTO: 6 X10(3) UL (ref 1.5–7.7)
NEUTROPHILS NFR BLD AUTO: 67.3 %
NITRITE UR QL STRIP.AUTO: NEGATIVE
NONHDLC SERPL-MCNC: 110 MG/DL (ref ?–130)
OSMOLALITY SERPL CALC.SUM OF ELEC: 289 MOSM/KG (ref 275–295)
PATIENT FASTING Y/N/NP: YES
PATIENT FASTING Y/N/NP: YES
PH UR: 6 [PH] (ref 5–8)
PLATELET # BLD AUTO: 237 10(3)UL (ref 150–450)
POTASSIUM SERPL-SCNC: 4.1 MMOL/L (ref 3.5–5.1)
PROT UR-MCNC: NEGATIVE MG/DL
RBC # BLD AUTO: 3.82 X10(6)UL (ref 3.8–5.3)
SODIUM SERPL-SCNC: 136 MMOL/L (ref 136–145)
SP GR UR STRIP: 1.01 (ref 1–1.03)
T4 FREE SERPL-MCNC: 1 NG/DL (ref 0.8–1.7)
TRIGL SERPL-MCNC: 329 MG/DL (ref 30–149)
TSI SER-ACNC: 3.92 MIU/ML (ref 0.36–3.74)
UROBILINOGEN UR STRIP-ACNC: <2
VLDLC SERPL CALC-MCNC: 66 MG/DL (ref 0–30)
WBC # BLD AUTO: 8.9 X10(3) UL (ref 4–11)

## 2020-07-08 PROCEDURE — 80061 LIPID PANEL: CPT

## 2020-07-08 PROCEDURE — 84439 ASSAY OF FREE THYROXINE: CPT

## 2020-07-08 PROCEDURE — 81003 URINALYSIS AUTO W/O SCOPE: CPT | Performed by: INTERNAL MEDICINE

## 2020-07-08 PROCEDURE — 83036 HEMOGLOBIN GLYCOSYLATED A1C: CPT

## 2020-07-08 PROCEDURE — 85025 COMPLETE CBC W/AUTO DIFF WBC: CPT

## 2020-07-08 PROCEDURE — 82043 UR ALBUMIN QUANTITATIVE: CPT

## 2020-07-08 PROCEDURE — 82570 ASSAY OF URINE CREATININE: CPT

## 2020-07-08 PROCEDURE — 80053 COMPREHEN METABOLIC PANEL: CPT

## 2020-07-08 PROCEDURE — 84443 ASSAY THYROID STIM HORMONE: CPT

## 2020-07-08 PROCEDURE — 36415 COLL VENOUS BLD VENIPUNCTURE: CPT

## 2020-07-08 NOTE — TELEPHONE ENCOUNTER
Lab results noted–7/8/20–CBC CMP TSH lipid A1c UA/MA–Hgb 11.8 (stable) creat 1.3 GFR 40  LDL 44 A1c 6.4 TSH 3.92.    FT4 added to lab. Patient has upcoming visit 7/14/20.

## 2020-07-14 ENCOUNTER — OFFICE VISIT (OUTPATIENT)
Dept: INTERNAL MEDICINE CLINIC | Facility: CLINIC | Age: 75
End: 2020-07-14
Payer: MEDICARE

## 2020-07-14 ENCOUNTER — TELEPHONE (OUTPATIENT)
Dept: INTERNAL MEDICINE CLINIC | Facility: CLINIC | Age: 75
End: 2020-07-14

## 2020-07-14 VITALS
TEMPERATURE: 98 F | HEART RATE: 73 BPM | BODY MASS INDEX: 26.98 KG/M2 | WEIGHT: 146.63 LBS | OXYGEN SATURATION: 97 % | SYSTOLIC BLOOD PRESSURE: 138 MMHG | DIASTOLIC BLOOD PRESSURE: 62 MMHG | HEIGHT: 62 IN

## 2020-07-14 DIAGNOSIS — E11.9 TYPE 2 DIABETES MELLITUS WITHOUT COMPLICATION, WITHOUT LONG-TERM CURRENT USE OF INSULIN (HCC): Primary | ICD-10-CM

## 2020-07-14 DIAGNOSIS — R19.8 ABDOMINAL FULLNESS IN RIGHT UPPER QUADRANT: ICD-10-CM

## 2020-07-14 DIAGNOSIS — N18.30 STAGE 3 CHRONIC KIDNEY DISEASE (HCC): ICD-10-CM

## 2020-07-14 DIAGNOSIS — R93.1 ELEVATED CORONARY ARTERY CALCIUM SCORE: ICD-10-CM

## 2020-07-14 DIAGNOSIS — E78.2 HYPERLIPIDEMIA, MIXED: ICD-10-CM

## 2020-07-14 DIAGNOSIS — I49.3 PVC'S (PREMATURE VENTRICULAR CONTRACTIONS): ICD-10-CM

## 2020-07-14 DIAGNOSIS — I10 ESSENTIAL HYPERTENSION: ICD-10-CM

## 2020-07-14 DIAGNOSIS — Z12.31 VISIT FOR SCREENING MAMMOGRAM: ICD-10-CM

## 2020-07-14 PROCEDURE — 93010 ELECTROCARDIOGRAM REPORT: CPT | Performed by: INTERNAL MEDICINE

## 2020-07-14 PROCEDURE — 99214 OFFICE O/P EST MOD 30 MIN: CPT | Performed by: INTERNAL MEDICINE

## 2020-07-14 PROCEDURE — G0463 HOSPITAL OUTPT CLINIC VISIT: HCPCS | Performed by: INTERNAL MEDICINE

## 2020-07-14 PROCEDURE — 93005 ELECTROCARDIOGRAM TRACING: CPT | Performed by: INTERNAL MEDICINE

## 2020-07-14 NOTE — TELEPHONE ENCOUNTER
To nursing. Please tell pt EKG result is ok. Regular rhythm. No arrhythmia seen. Thanks. Note to self-  EKG at today's visit--SR, LBBB unchanged to EKG of 4/3/19 exc no longer PVCs.

## 2020-07-14 NOTE — PROGRESS NOTES
Florin Johnson is a 66year old female who presents for     Check at 6 mo. Feels good. Has been staying home during COVID pandemic.      Diabetes: pt notes sugars are better--90s to 140.    rosy incr levimir 16 units/d 1/22/20 visit--and continued metfor [Sulfametho*    NAUSEA AND VOMITING    Comment:Nausea and vomiting in 7/2018  Atorvastatin                Comment:Other reaction(s): ears ring    Review of systems:  Constitutional:  No fever, loss of appetite or unintentional weight loss  Respiratory: No further eval needed. CKD stage 3--creat 1.3 GFR 40 on 7/8/20 (stable). Abd fullness--RUQ--? Stool or rel to prior GB surgery scar. Pt declines to go for US or CT--she is fearful due to covid pandemic. She agrees to recheck in 2 wks. miralax prn.    Irre FLEXTOUCH) 100 UNIT/ML Subcutaneous Solution Pen-injector Inject 16 Units into the skin daily.  15 mL 3   • Glucose Blood (FREESTYLE LITE TEST) In Vitro Strip TEST BLOOD SUGAR ONCE DAILY 100 strip 3   • simvastatin (ZOCOR) 5 MG Oral Tab Take 1 tablet (5 mg

## 2020-07-28 NOTE — PROGRESS NOTES
Chad Pate is a 66year old female who presents for     Check at 2 weeks    Here to re-check abdominal fullness RUQ. Pt notes no pain in this area. Moving bowels regularly. No blood in stool. No blood in urine.      HTN--pt asks if can cut back h 130/60   Pulse 82   Temp (!) 96.4 °F (35.8 °C) (Temporal)   Ht 5' 2\" (1.575 m)   Wt 145 lb 3.2 oz (65.9 kg)   SpO2 98%   BMI 26.56 kg/m²       Wt Readings from Last 6 Encounters:  07/29/20 : 145 lb 3.2 oz (65.9 kg)  07/14/20 : 146 lb 9.6 oz (66.5 kg)  01/ 7/14/20--SR with LBBB--copared to EKG of 4/3/19, PVCs no longer seen. LBBB -LBBB on EKG of 5/19/14 visit-stress echo per Dr. Charles Vigil 6/11/14- nl.   Carotid bruits--bilat.  Lifeline screening 10/3/17-carotids mild bilat--> mild bilat carotids on Lifeline 9 WITH MEALS 180 tablet 3   • glimepiride 4 MG Oral Tab TAKE 2 TABLETS (8MG TOTAL) EVERY MORNING BEFORE       BREAKFAST 180 tablet 3   • Enalapril Maleate (VASOTEC) 20 MG Oral Tab Take 1 tablet (20 mg total) by mouth daily.  90 tablet 3   • amLODIPine Besylat

## 2020-07-29 ENCOUNTER — OFFICE VISIT (OUTPATIENT)
Dept: INTERNAL MEDICINE CLINIC | Facility: CLINIC | Age: 75
End: 2020-07-29
Payer: MEDICARE

## 2020-07-29 VITALS
HEIGHT: 62 IN | TEMPERATURE: 96 F | WEIGHT: 145.19 LBS | BODY MASS INDEX: 26.72 KG/M2 | HEART RATE: 82 BPM | DIASTOLIC BLOOD PRESSURE: 60 MMHG | OXYGEN SATURATION: 98 % | SYSTOLIC BLOOD PRESSURE: 130 MMHG

## 2020-07-29 DIAGNOSIS — I10 ESSENTIAL HYPERTENSION: ICD-10-CM

## 2020-07-29 DIAGNOSIS — R19.8 ABDOMINAL FULLNESS IN RIGHT UPPER QUADRANT: Primary | ICD-10-CM

## 2020-07-29 PROCEDURE — G0463 HOSPITAL OUTPT CLINIC VISIT: HCPCS | Performed by: INTERNAL MEDICINE

## 2020-07-29 PROCEDURE — 99213 OFFICE O/P EST LOW 20 MIN: CPT | Performed by: INTERNAL MEDICINE

## 2020-07-29 RX ORDER — HYDROCHLOROTHIAZIDE 50 MG/1
25 TABLET ORAL DAILY
Qty: 90 TABLET | Refills: 3 | Status: ON HOLD | COMMUNITY
Start: 2020-07-29 | End: 2020-09-03

## 2020-07-31 ENCOUNTER — HOSPITAL ENCOUNTER (OUTPATIENT)
Dept: CT IMAGING | Age: 75
Discharge: HOME | End: 2020-07-31
Attending: INTERNAL MEDICINE
Payer: MEDICARE

## 2020-07-31 ENCOUNTER — TELEPHONE (OUTPATIENT)
Dept: INTERNAL MEDICINE CLINIC | Facility: CLINIC | Age: 75
End: 2020-07-31

## 2020-07-31 DIAGNOSIS — R19.8 ABDOMINAL FULLNESS IN RIGHT UPPER QUADRANT: ICD-10-CM

## 2020-07-31 PROCEDURE — 74176 CT ABD & PELVIS W/O CONTRAST: CPT | Performed by: INTERNAL MEDICINE

## 2020-07-31 NOTE — TELEPHONE ENCOUNTER
I called pt and discussed CT abd pelvis results of today 7/31/20--12.1 cm R renal mass. I recommend she see urology–I gave her the names of Dr. Crispin Groves, Dr. oHdan Soares, Dr. Bobby Cevallos. She will call for appointment and let me know. She expr understanding.

## 2020-08-01 ENCOUNTER — HOSPITAL ENCOUNTER (OUTPATIENT)
Dept: MAMMOGRAPHY | Facility: HOSPITAL | Age: 75
Discharge: HOME | End: 2020-08-01
Attending: INTERNAL MEDICINE
Payer: MEDICARE

## 2020-08-01 DIAGNOSIS — Z12.31 VISIT FOR SCREENING MAMMOGRAM: ICD-10-CM

## 2020-08-01 PROCEDURE — 77063 BREAST TOMOSYNTHESIS BI: CPT | Performed by: INTERNAL MEDICINE

## 2020-08-01 PROCEDURE — 77067 SCR MAMMO BI INCL CAD: CPT | Performed by: INTERNAL MEDICINE

## 2020-08-03 ENCOUNTER — TELEPHONE (OUTPATIENT)
Dept: INTERNAL MEDICINE CLINIC | Facility: CLINIC | Age: 75
End: 2020-08-03

## 2020-08-03 NOTE — TELEPHONE ENCOUNTER
To nursing, please tell patient  Mammogram 8/1/20–results okay. When I spoke to her Friday 7/31/20, I recommended she make an appointment with a urologist.  Was she able to get an appointment? Thanks.

## 2020-08-04 NOTE — TELEPHONE ENCOUNTER
Spoke to patient and relayed MD message. Patient verbalized understanding. KAMILA Hartmann-- pt has an appt with urologist Dr. Jalil De Jesus this Wednesday (8/5/20).

## 2020-08-05 ENCOUNTER — TELEPHONE (OUTPATIENT)
Dept: INTERNAL MEDICINE CLINIC | Facility: CLINIC | Age: 75
End: 2020-08-05

## 2020-08-05 NOTE — TELEPHONE ENCOUNTER
Staff message received from Dr. Eufemia Weathers who saw patient today:  I am doing full metastatic work up with MR Brain, CT Chest, and bone scan.  Will also do an MR of the kidney to make sure no tumor thrombus.  Trying to expedite this.  I did tell her that this

## 2020-08-10 ENCOUNTER — HOSPITAL ENCOUNTER (OUTPATIENT)
Dept: NUCLEAR MEDICINE | Facility: HOSPITAL | Age: 75
Discharge: HOME | End: 2020-08-10
Attending: UROLOGY
Payer: MEDICARE

## 2020-08-10 DIAGNOSIS — N28.89 RIGHT KIDNEY MASS: ICD-10-CM

## 2020-08-10 PROCEDURE — 78306 BONE IMAGING WHOLE BODY: CPT | Performed by: UROLOGY

## 2020-08-18 ENCOUNTER — HOSPITAL ENCOUNTER (OUTPATIENT)
Dept: MRI IMAGING | Facility: HOSPITAL | Age: 75
Discharge: HOME | End: 2020-08-18
Attending: UROLOGY
Payer: MEDICARE

## 2020-08-18 ENCOUNTER — HOSPITAL ENCOUNTER (OUTPATIENT)
Dept: CT IMAGING | Facility: HOSPITAL | Age: 75
Discharge: HOME | End: 2020-08-18
Attending: UROLOGY
Payer: MEDICARE

## 2020-08-18 DIAGNOSIS — N28.89 RIGHT RENAL MASS: ICD-10-CM

## 2020-08-18 LAB — CREAT BLD-MCNC: 1.4 MG/DL (ref 0.55–1.02)

## 2020-08-18 PROCEDURE — 71260 CT THORAX DX C+: CPT | Performed by: UROLOGY

## 2020-08-18 PROCEDURE — 82565 ASSAY OF CREATININE: CPT

## 2020-08-18 PROCEDURE — 74183 MRI ABD W/O CNTR FLWD CNTR: CPT | Performed by: UROLOGY

## 2020-08-18 PROCEDURE — 70553 MRI BRAIN STEM W/O & W/DYE: CPT | Performed by: UROLOGY

## 2020-08-18 PROCEDURE — A9575 INJ GADOTERATE MEGLUMI 0.1ML: HCPCS | Performed by: UROLOGY

## 2020-08-19 ENCOUNTER — TELEPHONE (OUTPATIENT)
Dept: INTERNAL MEDICINE CLINIC | Facility: CLINIC | Age: 75
End: 2020-08-19

## 2020-08-19 NOTE — TELEPHONE ENCOUNTER
To Dr. Cecilio Sacks----    Please see message below. Patient is scheduled for \"robotic assisted laparoscopic right radical nephrectomy, possible convertion to open procedure. \"     Would you like office to schedule pre-op clearance exam?

## 2020-08-19 NOTE — TELEPHONE ENCOUNTER
Pt is scheduled for surgery with Dr Marely Merritt on 9/2/20  Does pt need pre-surgical appt? Dr Pierda Fitting office will be sending pre-surgical clearance which will require completion by Dr Florencio Stoll  Please call pt to discuss/advise  Pt has appt with Dr Marely Merritt on 8/27/20  Should pt cancel appt on 9/31?   Tasked to nursing

## 2020-08-21 ENCOUNTER — OFFICE VISIT (OUTPATIENT)
Dept: INTERNAL MEDICINE CLINIC | Facility: CLINIC | Age: 75
End: 2020-08-21
Payer: MEDICARE

## 2020-08-21 VITALS
TEMPERATURE: 97 F | HEART RATE: 80 BPM | SYSTOLIC BLOOD PRESSURE: 138 MMHG | HEIGHT: 62 IN | DIASTOLIC BLOOD PRESSURE: 66 MMHG | BODY MASS INDEX: 25.58 KG/M2 | WEIGHT: 139 LBS

## 2020-08-21 DIAGNOSIS — R94.31 ABNORMAL EKG: ICD-10-CM

## 2020-08-21 DIAGNOSIS — E11.9 TYPE 2 DIABETES MELLITUS WITHOUT COMPLICATION, WITHOUT LONG-TERM CURRENT USE OF INSULIN (HCC): ICD-10-CM

## 2020-08-21 DIAGNOSIS — N18.30 STAGE 3 CHRONIC KIDNEY DISEASE (HCC): ICD-10-CM

## 2020-08-21 DIAGNOSIS — Z01.818 PRE-OP EXAM: Primary | ICD-10-CM

## 2020-08-21 DIAGNOSIS — N28.89 RENAL MASS, RIGHT: ICD-10-CM

## 2020-08-21 DIAGNOSIS — I44.7 LBBB (LEFT BUNDLE BRANCH BLOCK): ICD-10-CM

## 2020-08-21 DIAGNOSIS — I10 ESSENTIAL HYPERTENSION: ICD-10-CM

## 2020-08-21 PROCEDURE — G0463 HOSPITAL OUTPT CLINIC VISIT: HCPCS | Performed by: INTERNAL MEDICINE

## 2020-08-21 PROCEDURE — 99214 OFFICE O/P EST MOD 30 MIN: CPT | Performed by: INTERNAL MEDICINE

## 2020-08-21 NOTE — PROGRESS NOTES
David Ferris is a 66year old female who presents for     Pre-op exam  To have robotic asissted laparoscopic R radical nephrectomy 9/2/20 Dr Yonny Agrawal. CT abd pelvis results 7/31/20--12.1 cm R renal mass.   Saw Dr Yonny Agrawal who did staging work up--MRI systems:  Constitutional:  No fever, loss of appetite or unintentional weight loss  Gastrointestinal: No abdominal pain, vomiting, diarrhea or constipation  Genitourinary:  No dysuria or blood in the urine        EXAM:   /66   Pulse 80   Temp 97.4 °F ophtho Dr Domingo Grover. CKD stage 3--creat 1.3 GFR 40 on 7/8/20 (stable). Discussed renal fcn already lower than normal and with removal of a kidney, likely will go lower. Hyperlipidemia, mxd--On simvastatin 5 mg daily.  LDL 44 on 7/8/20.  Less ca strip 3   • simvastatin (ZOCOR) 5 MG Oral Tab Take 1 tablet (5 mg total) by mouth daily.  90 tablet 3   • metFORMIN HCl 1000 MG Oral Tab TAKE 1 TABLET TWICE DAILY  WITH MEALS 180 tablet 3   • glimepiride 4 MG Oral Tab TAKE 2 TABLETS (8MG TOTAL) EVERY MORNIN

## 2020-08-23 RX ORDER — PEN NEEDLE, DIABETIC 31 G X1/4"
NEEDLE, DISPOSABLE MISCELLANEOUS
Qty: 100 EACH | Refills: 1 | Status: SHIPPED | OUTPATIENT
Start: 2020-08-23 | End: 2020-12-18

## 2020-08-26 ENCOUNTER — TELEPHONE (OUTPATIENT)
Dept: INTERNAL MEDICINE CLINIC | Facility: CLINIC | Age: 75
End: 2020-08-26

## 2020-08-26 ENCOUNTER — HOSPITAL ENCOUNTER (OUTPATIENT)
Dept: NUCLEAR MEDICINE | Facility: HOSPITAL | Age: 75
Discharge: HOME | End: 2020-08-26
Attending: INTERNAL MEDICINE
Payer: MEDICARE

## 2020-08-26 ENCOUNTER — HOSPITAL ENCOUNTER (OUTPATIENT)
Dept: CV DIAGNOSTICS | Facility: HOSPITAL | Age: 75
Discharge: HOME | End: 2020-08-26
Attending: INTERNAL MEDICINE
Payer: MEDICARE

## 2020-08-26 DIAGNOSIS — I44.7 LBBB (LEFT BUNDLE BRANCH BLOCK): ICD-10-CM

## 2020-08-26 DIAGNOSIS — R94.31 ABNORMAL EKG: ICD-10-CM

## 2020-08-26 DIAGNOSIS — Z01.818 PRE-OP EXAM: ICD-10-CM

## 2020-08-26 PROCEDURE — 93016 CV STRESS TEST SUPVJ ONLY: CPT | Performed by: INTERNAL MEDICINE

## 2020-08-26 PROCEDURE — 93017 CV STRESS TEST TRACING ONLY: CPT | Performed by: INTERNAL MEDICINE

## 2020-08-26 PROCEDURE — 78452 HT MUSCLE IMAGE SPECT MULT: CPT | Performed by: INTERNAL MEDICINE

## 2020-08-26 PROCEDURE — 93018 CV STRESS TEST I&R ONLY: CPT | Performed by: INTERNAL MEDICINE

## 2020-08-26 NOTE — TELEPHONE ENCOUNTER
[phone]  Lexiscan stress test 8/26/20–small fixed apical perfusion defect. LVEF 60%. I called Dr. Catherine Womack who reviewed the results. Defect may be due to LBBB. Patient has no cardiac symptoms. No further cardiac testing follow-up needed.     I torres

## 2020-08-28 ENCOUNTER — LAB ENCOUNTER (OUTPATIENT)
Dept: LAB | Facility: HOSPITAL | Age: 75
End: 2020-08-28
Attending: UROLOGY
Payer: MEDICARE

## 2020-08-28 DIAGNOSIS — Z01.812 PRE-OPERATIVE LABORATORY EXAMINATION: ICD-10-CM

## 2020-08-28 DIAGNOSIS — N28.89 RENAL MASS: Primary | ICD-10-CM

## 2020-08-28 LAB
ANION GAP SERPL CALC-SCNC: 6 MMOL/L (ref 0–18)
APTT PPP: 25.3 SECONDS (ref 23.2–35.3)
BASOPHILS # BLD AUTO: 0.06 X10(3) UL (ref 0–0.2)
BASOPHILS NFR BLD AUTO: 0.7 %
BUN BLD-MCNC: 18 MG/DL (ref 7–18)
BUN/CREAT SERPL: 13.7 (ref 10–20)
CALCIUM BLD-MCNC: 10 MG/DL (ref 8.5–10.1)
CHLORIDE SERPL-SCNC: 105 MMOL/L (ref 98–112)
CO2 SERPL-SCNC: 27 MMOL/L (ref 21–32)
CREAT BLD-MCNC: 1.31 MG/DL (ref 0.55–1.02)
DEPRECATED RDW RBC AUTO: 42.4 FL (ref 35.1–46.3)
EOSINOPHIL # BLD AUTO: 0.25 X10(3) UL (ref 0–0.7)
EOSINOPHIL NFR BLD AUTO: 2.7 %
ERYTHROCYTE [DISTWIDTH] IN BLOOD BY AUTOMATED COUNT: 13 % (ref 11–15)
GLUCOSE BLD-MCNC: 109 MG/DL (ref 70–99)
HCT VFR BLD AUTO: 34.1 % (ref 35–48)
HGB BLD-MCNC: 11.7 G/DL (ref 12–16)
IMM GRANULOCYTES # BLD AUTO: 0.04 X10(3) UL (ref 0–1)
IMM GRANULOCYTES NFR BLD: 0.4 %
INR BLD: 0.98 (ref 0.9–1.2)
LYMPHOCYTES # BLD AUTO: 1.71 X10(3) UL (ref 1–4)
LYMPHOCYTES NFR BLD AUTO: 18.6 %
MCH RBC QN AUTO: 30.8 PG (ref 26–34)
MCHC RBC AUTO-ENTMCNC: 34.3 G/DL (ref 31–37)
MCV RBC AUTO: 89.7 FL (ref 80–100)
MONOCYTES # BLD AUTO: 0.55 X10(3) UL (ref 0.1–1)
MONOCYTES NFR BLD AUTO: 6 %
NEUTROPHILS # BLD AUTO: 6.6 X10 (3) UL (ref 1.5–7.7)
NEUTROPHILS # BLD AUTO: 6.6 X10(3) UL (ref 1.5–7.7)
NEUTROPHILS NFR BLD AUTO: 71.6 %
OSMOLALITY SERPL CALC.SUM OF ELEC: 288 MOSM/KG (ref 275–295)
PATIENT FASTING Y/N/NP: NO
PLATELET # BLD AUTO: 269 10(3)UL (ref 150–450)
POTASSIUM SERPL-SCNC: 4.7 MMOL/L (ref 3.5–5.1)
PROTHROMBIN TIME: 12.8 SECONDS (ref 11.8–14.5)
RBC # BLD AUTO: 3.8 X10(6)UL (ref 3.8–5.3)
SODIUM SERPL-SCNC: 138 MMOL/L (ref 136–145)
WBC # BLD AUTO: 9.2 X10(3) UL (ref 4–11)

## 2020-08-28 PROCEDURE — 85025 COMPLETE CBC W/AUTO DIFF WBC: CPT

## 2020-08-28 PROCEDURE — 87086 URINE CULTURE/COLONY COUNT: CPT

## 2020-08-28 PROCEDURE — 85730 THROMBOPLASTIN TIME PARTIAL: CPT

## 2020-08-28 PROCEDURE — 36415 COLL VENOUS BLD VENIPUNCTURE: CPT

## 2020-08-28 PROCEDURE — 80048 BASIC METABOLIC PNL TOTAL CA: CPT

## 2020-08-28 PROCEDURE — 85610 PROTHROMBIN TIME: CPT

## 2020-08-31 ENCOUNTER — TELEPHONE (OUTPATIENT)
Dept: INTERNAL MEDICINE CLINIC | Facility: CLINIC | Age: 75
End: 2020-08-31

## 2020-08-31 ENCOUNTER — APPOINTMENT (OUTPATIENT)
Dept: LAB | Facility: HOSPITAL | Age: 75
DRG: 658 | End: 2020-08-31
Attending: UROLOGY
Payer: MEDICARE

## 2020-08-31 DIAGNOSIS — Z01.818 PREOP TESTING: ICD-10-CM

## 2020-08-31 LAB
ANTIBODY SCREEN: NEGATIVE
RH BLOOD TYPE: POSITIVE
SARS-COV-2 RNA RESP QL NAA+PROBE: NOT DETECTED

## 2020-08-31 PROCEDURE — 36415 COLL VENOUS BLD VENIPUNCTURE: CPT

## 2020-08-31 PROCEDURE — 86901 BLOOD TYPING SEROLOGIC RH(D): CPT

## 2020-08-31 PROCEDURE — 86850 RBC ANTIBODY SCREEN: CPT

## 2020-08-31 PROCEDURE — 86900 BLOOD TYPING SEROLOGIC ABO: CPT

## 2020-08-31 NOTE — TELEPHONE ENCOUNTER
I spoke with Taiwo Benson and relayed Dr. Irma Henley message. She verbalized understanding. Taiwo Benson explained that the hospital asked her to check with Dr. Luis Hartley because Dr. Luis Hartley is the prescribing doctor. To Dr. Luis Hartley, just Paola Anderson.

## 2020-08-31 NOTE — TELEPHONE ENCOUNTER
Pt was informed by the hospital to check with her PCP as to whether she should hold aspirin before procedure scheduled on 9/2/20 (robotic assisted laparoscopic right radical nephrectomy, possible conversion to open procedure). To Dr. Eryn Collazo for review.

## 2020-08-31 NOTE — TELEPHONE ENCOUNTER
To nursing, please tell pt I called and talked to Dr Hunt Failing wants her to hold the the aspirin from now til the surgery. She'll tell her when ok to restart post op. Thanks.

## 2020-08-31 NOTE — TELEPHONE ENCOUNTER
To nursing, please tell patient I prefer she stay on the baby aspirin–but if the surgeon wants her to hold it, then she should hold it. Thanks.

## 2020-08-31 NOTE — TELEPHONE ENCOUNTER
Pt. Is having surgery 9/2 she wants to know if she needs to stop taking her baby aspirin please advise ph.  # [phone]  Routed high to clinical

## 2020-09-02 ENCOUNTER — ANESTHESIA EVENT (OUTPATIENT)
Dept: SURGERY | Facility: HOSPITAL | Age: 75
DRG: 658 | End: 2020-09-02
Payer: MEDICARE

## 2020-09-02 ENCOUNTER — ANESTHESIA (OUTPATIENT)
Dept: SURGERY | Facility: HOSPITAL | Age: 75
DRG: 658 | End: 2020-09-02
Payer: MEDICARE

## 2020-09-02 ENCOUNTER — HOSPITAL ENCOUNTER (INPATIENT)
Facility: HOSPITAL | Age: 75
LOS: 1 days | Discharge: HOME | DRG: 658 | End: 2020-09-03
Attending: UROLOGY | Admitting: UROLOGY
Payer: MEDICARE

## 2020-09-02 DIAGNOSIS — N18.30 STAGE 3 CHRONIC KIDNEY DISEASE (HCC): ICD-10-CM

## 2020-09-02 DIAGNOSIS — Z01.818 PREOP TESTING: Primary | ICD-10-CM

## 2020-09-02 DIAGNOSIS — N28.89 RIGHT RENAL MASS: ICD-10-CM

## 2020-09-02 LAB
ANION GAP SERPL CALC-SCNC: 10 MMOL/L (ref 0–18)
BUN BLD-MCNC: 18 MG/DL (ref 7–18)
BUN/CREAT SERPL: 11.2 (ref 10–20)
CALCIUM BLD-MCNC: 8.1 MG/DL (ref 8.5–10.1)
CHLORIDE SERPL-SCNC: 108 MMOL/L (ref 98–112)
CO2 SERPL-SCNC: 21 MMOL/L (ref 21–32)
CREAT BLD-MCNC: 1.61 MG/DL (ref 0.55–1.02)
DEPRECATED RDW RBC AUTO: 43.2 FL (ref 35.1–46.3)
ERYTHROCYTE [DISTWIDTH] IN BLOOD BY AUTOMATED COUNT: 13.2 % (ref 11–15)
GLUCOSE BLD-MCNC: 197 MG/DL (ref 70–99)
GLUCOSE BLDC GLUCOMTR-MCNC: 160 MG/DL (ref 70–99)
GLUCOSE BLDC GLUCOMTR-MCNC: 176 MG/DL (ref 70–99)
GLUCOSE BLDC GLUCOMTR-MCNC: 182 MG/DL (ref 70–99)
GLUCOSE BLDC GLUCOMTR-MCNC: 184 MG/DL (ref 70–99)
GLUCOSE BLDC GLUCOMTR-MCNC: 186 MG/DL (ref 70–99)
GLUCOSE BLDC GLUCOMTR-MCNC: 206 MG/DL (ref 70–99)
GLUCOSE BLDC GLUCOMTR-MCNC: 209 MG/DL (ref 70–99)
HCT VFR BLD AUTO: 29.1 % (ref 35–48)
HGB BLD-MCNC: 10.1 G/DL (ref 12–16)
MCH RBC QN AUTO: 31.2 PG (ref 26–34)
MCHC RBC AUTO-ENTMCNC: 34.7 G/DL (ref 31–37)
MCV RBC AUTO: 89.8 FL (ref 80–100)
OSMOLALITY SERPL CALC.SUM OF ELEC: 295 MOSM/KG (ref 275–295)
PLATELET # BLD AUTO: 210 10(3)UL (ref 150–450)
POTASSIUM SERPL-SCNC: 3.7 MMOL/L (ref 3.5–5.1)
RBC # BLD AUTO: 3.24 X10(6)UL (ref 3.8–5.3)
SODIUM SERPL-SCNC: 139 MMOL/L (ref 136–145)
WBC # BLD AUTO: 12.4 X10(3) UL (ref 4–11)

## 2020-09-02 PROCEDURE — 0DNW4ZZ RELEASE PERITONEUM, PERCUTANEOUS ENDOSCOPIC APPROACH: ICD-10-PCS | Performed by: UROLOGY

## 2020-09-02 PROCEDURE — 99232 SBSQ HOSP IP/OBS MODERATE 35: CPT | Performed by: HOSPITALIST

## 2020-09-02 PROCEDURE — 0TT04ZZ RESECTION OF RIGHT KIDNEY, PERCUTANEOUS ENDOSCOPIC APPROACH: ICD-10-PCS | Performed by: UROLOGY

## 2020-09-02 RX ORDER — HALOPERIDOL 5 MG/ML
0.25 INJECTION INTRAMUSCULAR ONCE AS NEEDED
Status: COMPLETED | OUTPATIENT
Start: 2020-09-02 | End: 2020-09-02

## 2020-09-02 RX ORDER — HYDROMORPHONE HYDROCHLORIDE 1 MG/ML
0.2 INJECTION, SOLUTION INTRAMUSCULAR; INTRAVENOUS; SUBCUTANEOUS EVERY 2 HOUR PRN
Status: DISCONTINUED | OUTPATIENT
Start: 2020-09-02 | End: 2020-09-03

## 2020-09-02 RX ORDER — HEPARIN SODIUM 5000 [USP'U]/ML
5000 INJECTION, SOLUTION INTRAVENOUS; SUBCUTANEOUS EVERY 8 HOURS SCHEDULED
Status: DISCONTINUED | OUTPATIENT
Start: 2020-09-02 | End: 2020-09-03

## 2020-09-02 RX ORDER — ACETAMINOPHEN 500 MG
1000 TABLET ORAL ONCE
Status: COMPLETED | OUTPATIENT
Start: 2020-09-02 | End: 2020-09-02

## 2020-09-02 RX ORDER — DEXTROSE MONOHYDRATE 25 G/50ML
50 INJECTION, SOLUTION INTRAVENOUS
Status: DISCONTINUED | OUTPATIENT
Start: 2020-09-02 | End: 2020-09-02 | Stop reason: HOSPADM

## 2020-09-02 RX ORDER — POLYETHYLENE GLYCOL 3350 17 G/17G
17 POWDER, FOR SOLUTION ORAL DAILY PRN
Status: DISCONTINUED | OUTPATIENT
Start: 2020-09-02 | End: 2020-09-03

## 2020-09-02 RX ORDER — HYDROMORPHONE HYDROCHLORIDE 1 MG/ML
0.6 INJECTION, SOLUTION INTRAMUSCULAR; INTRAVENOUS; SUBCUTANEOUS EVERY 5 MIN PRN
Status: DISCONTINUED | OUTPATIENT
Start: 2020-09-02 | End: 2020-09-02 | Stop reason: HOSPADM

## 2020-09-02 RX ORDER — HYDROCODONE BITARTRATE AND ACETAMINOPHEN 5; 325 MG/1; MG/1
2 TABLET ORAL AS NEEDED
Status: DISCONTINUED | OUTPATIENT
Start: 2020-09-02 | End: 2020-09-02 | Stop reason: HOSPADM

## 2020-09-02 RX ORDER — NALOXONE HYDROCHLORIDE 0.4 MG/ML
80 INJECTION, SOLUTION INTRAMUSCULAR; INTRAVENOUS; SUBCUTANEOUS AS NEEDED
Status: DISCONTINUED | OUTPATIENT
Start: 2020-09-02 | End: 2020-09-02 | Stop reason: HOSPADM

## 2020-09-02 RX ORDER — CEFAZOLIN SODIUM/WATER 2 G/20 ML
2 SYRINGE (ML) INTRAVENOUS EVERY 8 HOURS
Status: DISCONTINUED | OUTPATIENT
Start: 2020-09-02 | End: 2020-09-02

## 2020-09-02 RX ORDER — HYDROMORPHONE HYDROCHLORIDE 1 MG/ML
0.4 INJECTION, SOLUTION INTRAMUSCULAR; INTRAVENOUS; SUBCUTANEOUS EVERY 5 MIN PRN
Status: DISCONTINUED | OUTPATIENT
Start: 2020-09-02 | End: 2020-09-02 | Stop reason: HOSPADM

## 2020-09-02 RX ORDER — BISACODYL 10 MG
10 SUPPOSITORY, RECTAL RECTAL
Status: DISCONTINUED | OUTPATIENT
Start: 2020-09-02 | End: 2020-09-03

## 2020-09-02 RX ORDER — PROCHLORPERAZINE EDISYLATE 5 MG/ML
5 INJECTION INTRAMUSCULAR; INTRAVENOUS ONCE AS NEEDED
Status: DISCONTINUED | OUTPATIENT
Start: 2020-09-02 | End: 2020-09-02 | Stop reason: HOSPADM

## 2020-09-02 RX ORDER — LIDOCAINE HYDROCHLORIDE 10 MG/ML
INJECTION, SOLUTION EPIDURAL; INFILTRATION; INTRACAUDAL; PERINEURAL AS NEEDED
Status: DISCONTINUED | OUTPATIENT
Start: 2020-09-02 | End: 2020-09-02 | Stop reason: SURG

## 2020-09-02 RX ORDER — DIPHENHYDRAMINE HYDROCHLORIDE 50 MG/ML
12.5 INJECTION INTRAMUSCULAR; INTRAVENOUS ONCE
Status: COMPLETED | OUTPATIENT
Start: 2020-09-02 | End: 2020-09-02

## 2020-09-02 RX ORDER — CEFAZOLIN SODIUM/WATER 2 G/20 ML
2 SYRINGE (ML) INTRAVENOUS ONCE
Status: COMPLETED | OUTPATIENT
Start: 2020-09-02 | End: 2020-09-02

## 2020-09-02 RX ORDER — ONDANSETRON 2 MG/ML
4 INJECTION INTRAMUSCULAR; INTRAVENOUS EVERY 6 HOURS PRN
Status: DISCONTINUED | OUTPATIENT
Start: 2020-09-02 | End: 2020-09-03

## 2020-09-02 RX ORDER — ACETAMINOPHEN 325 MG/1
650 TABLET ORAL EVERY 4 HOURS PRN
Status: DISCONTINUED | OUTPATIENT
Start: 2020-09-02 | End: 2020-09-03

## 2020-09-02 RX ORDER — SODIUM CHLORIDE 0.9 % (FLUSH) 0.9 %
10 SYRINGE (ML) INJECTION AS NEEDED
Status: DISCONTINUED | OUTPATIENT
Start: 2020-09-02 | End: 2020-09-03

## 2020-09-02 RX ORDER — FAMOTIDINE 20 MG/1
20 TABLET ORAL ONCE
Status: DISCONTINUED | OUTPATIENT
Start: 2020-09-02 | End: 2020-09-02 | Stop reason: HOSPADM

## 2020-09-02 RX ORDER — GLYCOPYRROLATE 0.2 MG/ML
INJECTION, SOLUTION INTRAMUSCULAR; INTRAVENOUS AS NEEDED
Status: DISCONTINUED | OUTPATIENT
Start: 2020-09-02 | End: 2020-09-02 | Stop reason: SURG

## 2020-09-02 RX ORDER — SODIUM CHLORIDE, SODIUM LACTATE, POTASSIUM CHLORIDE, CALCIUM CHLORIDE 600; 310; 30; 20 MG/100ML; MG/100ML; MG/100ML; MG/100ML
INJECTION, SOLUTION INTRAVENOUS CONTINUOUS
Status: DISCONTINUED | OUTPATIENT
Start: 2020-09-02 | End: 2020-09-02 | Stop reason: HOSPADM

## 2020-09-02 RX ORDER — HYDROCODONE BITARTRATE AND ACETAMINOPHEN 5; 325 MG/1; MG/1
2 TABLET ORAL EVERY 4 HOURS PRN
Status: DISCONTINUED | OUTPATIENT
Start: 2020-09-02 | End: 2020-09-03

## 2020-09-02 RX ORDER — HYDROCODONE BITARTRATE AND ACETAMINOPHEN 5; 325 MG/1; MG/1
1 TABLET ORAL EVERY 4 HOURS PRN
Status: DISCONTINUED | OUTPATIENT
Start: 2020-09-02 | End: 2020-09-03

## 2020-09-02 RX ORDER — HYDROMORPHONE HYDROCHLORIDE 1 MG/ML
0.2 INJECTION, SOLUTION INTRAMUSCULAR; INTRAVENOUS; SUBCUTANEOUS EVERY 5 MIN PRN
Status: DISCONTINUED | OUTPATIENT
Start: 2020-09-02 | End: 2020-09-02 | Stop reason: HOSPADM

## 2020-09-02 RX ORDER — HYDROCODONE BITARTRATE AND ACETAMINOPHEN 5; 325 MG/1; MG/1
1 TABLET ORAL AS NEEDED
Status: DISCONTINUED | OUTPATIENT
Start: 2020-09-02 | End: 2020-09-02 | Stop reason: HOSPADM

## 2020-09-02 RX ORDER — ROCURONIUM BROMIDE 10 MG/ML
INJECTION, SOLUTION INTRAVENOUS AS NEEDED
Status: DISCONTINUED | OUTPATIENT
Start: 2020-09-02 | End: 2020-09-02 | Stop reason: SURG

## 2020-09-02 RX ORDER — SODIUM CHLORIDE 9 MG/ML
INJECTION, SOLUTION INTRAVENOUS CONTINUOUS
Status: DISCONTINUED | OUTPATIENT
Start: 2020-09-02 | End: 2020-09-03

## 2020-09-02 RX ORDER — CEFAZOLIN SODIUM/WATER 2 G/20 ML
2 SYRINGE (ML) INTRAVENOUS EVERY 8 HOURS
Status: COMPLETED | OUTPATIENT
Start: 2020-09-02 | End: 2020-09-03

## 2020-09-02 RX ORDER — NEOSTIGMINE METHYLSULFATE 1 MG/ML
INJECTION INTRAVENOUS AS NEEDED
Status: DISCONTINUED | OUTPATIENT
Start: 2020-09-02 | End: 2020-09-02 | Stop reason: SURG

## 2020-09-02 RX ORDER — MORPHINE SULFATE 10 MG/ML
6 INJECTION, SOLUTION INTRAMUSCULAR; INTRAVENOUS EVERY 10 MIN PRN
Status: DISCONTINUED | OUTPATIENT
Start: 2020-09-02 | End: 2020-09-02 | Stop reason: HOSPADM

## 2020-09-02 RX ORDER — SODIUM CHLORIDE, SODIUM LACTATE, POTASSIUM CHLORIDE, CALCIUM CHLORIDE 600; 310; 30; 20 MG/100ML; MG/100ML; MG/100ML; MG/100ML
INJECTION, SOLUTION INTRAVENOUS CONTINUOUS PRN
Status: DISCONTINUED | OUTPATIENT
Start: 2020-09-02 | End: 2020-09-02 | Stop reason: SURG

## 2020-09-02 RX ORDER — ONDANSETRON 2 MG/ML
4 INJECTION INTRAMUSCULAR; INTRAVENOUS ONCE AS NEEDED
Status: COMPLETED | OUTPATIENT
Start: 2020-09-02 | End: 2020-09-02

## 2020-09-02 RX ORDER — HYDROMORPHONE HYDROCHLORIDE 1 MG/ML
0.8 INJECTION, SOLUTION INTRAMUSCULAR; INTRAVENOUS; SUBCUTANEOUS EVERY 2 HOUR PRN
Status: DISCONTINUED | OUTPATIENT
Start: 2020-09-02 | End: 2020-09-03

## 2020-09-02 RX ORDER — DEXTROSE MONOHYDRATE 25 G/50ML
50 INJECTION, SOLUTION INTRAVENOUS
Status: DISCONTINUED | OUTPATIENT
Start: 2020-09-02 | End: 2020-09-03

## 2020-09-02 RX ORDER — LATANOPROST 50 UG/ML
1 SOLUTION/ DROPS OPHTHALMIC NIGHTLY
Status: DISCONTINUED | OUTPATIENT
Start: 2020-09-02 | End: 2020-09-03

## 2020-09-02 RX ORDER — ENALAPRIL MALEATE 20 MG/1
20 TABLET ORAL DAILY
Status: DISCONTINUED | OUTPATIENT
Start: 2020-09-03 | End: 2020-09-03

## 2020-09-02 RX ORDER — DIPHENHYDRAMINE HCL 25 MG
25 CAPSULE ORAL NIGHTLY PRN
Status: DISCONTINUED | OUTPATIENT
Start: 2020-09-02 | End: 2020-09-03

## 2020-09-02 RX ORDER — PHENYLEPHRINE HCL 10 MG/ML
VIAL (ML) INJECTION AS NEEDED
Status: DISCONTINUED | OUTPATIENT
Start: 2020-09-02 | End: 2020-09-02 | Stop reason: SURG

## 2020-09-02 RX ORDER — MORPHINE SULFATE 4 MG/ML
2 INJECTION, SOLUTION INTRAMUSCULAR; INTRAVENOUS EVERY 10 MIN PRN
Status: DISCONTINUED | OUTPATIENT
Start: 2020-09-02 | End: 2020-09-02 | Stop reason: HOSPADM

## 2020-09-02 RX ORDER — HYDROMORPHONE HYDROCHLORIDE 1 MG/ML
0.4 INJECTION, SOLUTION INTRAMUSCULAR; INTRAVENOUS; SUBCUTANEOUS EVERY 2 HOUR PRN
Status: DISCONTINUED | OUTPATIENT
Start: 2020-09-02 | End: 2020-09-03

## 2020-09-02 RX ORDER — AMLODIPINE BESYLATE 10 MG/1
10 TABLET ORAL DAILY
Status: DISCONTINUED | OUTPATIENT
Start: 2020-09-03 | End: 2020-09-03

## 2020-09-02 RX ORDER — METOCLOPRAMIDE 10 MG/1
10 TABLET ORAL ONCE
Status: DISCONTINUED | OUTPATIENT
Start: 2020-09-02 | End: 2020-09-02 | Stop reason: HOSPADM

## 2020-09-02 RX ORDER — DOCUSATE SODIUM 100 MG/1
100 CAPSULE, LIQUID FILLED ORAL 2 TIMES DAILY
Status: DISCONTINUED | OUTPATIENT
Start: 2020-09-02 | End: 2020-09-03

## 2020-09-02 RX ORDER — DIPHENHYDRAMINE HYDROCHLORIDE 50 MG/ML
12.5 INJECTION INTRAMUSCULAR; INTRAVENOUS NIGHTLY PRN
Status: DISCONTINUED | OUTPATIENT
Start: 2020-09-02 | End: 2020-09-03

## 2020-09-02 RX ORDER — ONDANSETRON 4 MG/1
4 TABLET, FILM COATED ORAL EVERY 6 HOURS PRN
Status: DISCONTINUED | OUTPATIENT
Start: 2020-09-02 | End: 2020-09-03

## 2020-09-02 RX ORDER — MORPHINE SULFATE 4 MG/ML
4 INJECTION, SOLUTION INTRAMUSCULAR; INTRAVENOUS EVERY 10 MIN PRN
Status: DISCONTINUED | OUTPATIENT
Start: 2020-09-02 | End: 2020-09-02 | Stop reason: HOSPADM

## 2020-09-02 RX ADMIN — PHENYLEPHRINE HCL 100 MCG: 10 MG/ML VIAL (ML) INJECTION at 08:08:00

## 2020-09-02 RX ADMIN — ROCURONIUM BROMIDE 10 MG: 10 INJECTION, SOLUTION INTRAVENOUS at 07:33:00

## 2020-09-02 RX ADMIN — SODIUM CHLORIDE, SODIUM LACTATE, POTASSIUM CHLORIDE, CALCIUM CHLORIDE: 600; 310; 30; 20 INJECTION, SOLUTION INTRAVENOUS at 10:52:00

## 2020-09-02 RX ADMIN — LIDOCAINE HYDROCHLORIDE 50 MG: 10 INJECTION, SOLUTION EPIDURAL; INFILTRATION; INTRACAUDAL; PERINEURAL at 07:33:00

## 2020-09-02 RX ADMIN — ROCURONIUM BROMIDE 40 MG: 10 INJECTION, SOLUTION INTRAVENOUS at 07:45:00

## 2020-09-02 RX ADMIN — CEFAZOLIN SODIUM/WATER 2 G: 2 G/20 ML SYRINGE (ML) INTRAVENOUS at 11:42:00

## 2020-09-02 RX ADMIN — CEFAZOLIN SODIUM/WATER 2 G: 2 G/20 ML SYRINGE (ML) INTRAVENOUS at 07:44:00

## 2020-09-02 RX ADMIN — SODIUM CHLORIDE: 9 INJECTION, SOLUTION INTRAVENOUS at 09:11:00

## 2020-09-02 RX ADMIN — SODIUM CHLORIDE, SODIUM LACTATE, POTASSIUM CHLORIDE, CALCIUM CHLORIDE: 600; 310; 30; 20 INJECTION, SOLUTION INTRAVENOUS at 07:29:00

## 2020-09-02 RX ADMIN — SODIUM CHLORIDE: 9 INJECTION, SOLUTION INTRAVENOUS at 07:38:00

## 2020-09-02 RX ADMIN — ROCURONIUM BROMIDE 20 MG: 10 INJECTION, SOLUTION INTRAVENOUS at 09:54:00

## 2020-09-02 RX ADMIN — NEOSTIGMINE METHYLSULFATE 5 MG: 1 INJECTION INTRAVENOUS at 11:05:00

## 2020-09-02 RX ADMIN — GLYCOPYRROLATE 0.6 MG: 0.2 INJECTION, SOLUTION INTRAMUSCULAR; INTRAVENOUS at 11:05:00

## 2020-09-02 NOTE — ANESTHESIA PROCEDURE NOTES
Airway  Date/Time: 9/2/2020 7:45 AM  Urgency: Elective    Airway not difficult    General Information and Staff    Patient location during procedure: OR  Anesthesiologist: Teagan Martin MD  Resident/CRNA: Armin Tolentino CRNA  Performed: CRNA     Indicat

## 2020-09-02 NOTE — ANESTHESIA PROCEDURE NOTES
Peripheral IV  Date/Time: 9/2/2020 7:38 AM  Inserted by: Antwan Mccarthy CRNA    Placement  Needle size: 18 G  Laterality: right  Location: wrist  Local anesthetic: none  Site prep: alcohol  Technique: anatomical landmarks  Attempts: 1

## 2020-09-02 NOTE — H&P
[facility] ADULT SHORT STAY HISTORY AND PHYSICAL      IDENTIFYING DATA  Patient is Ronnie Cosby a 66year old female. MRN is [id number]    Primary care physician: Jus Garcia MD    CHIEF COMPLAINT  No chief complaint on file.       HISTORY OF [place] Worry: Not on file        Inability: Not on file      Transportation needs:        Medical: Not on file        Non-medical: Not on file    Tobacco Use      Smoking status: Former Smoker        Packs/day: 1.00        Years: 20.00        Pack years: 21 daily., Disp: 15 mL, Rfl: 3  simvastatin (ZOCOR) 5 MG Oral Tab, Take 1 tablet (5 mg total) by mouth daily. , Disp: 90 tablet, Rfl: 3  metFORMIN HCl 1000 MG Oral Tab, TAKE 1 TABLET TWICE DAILY  WITH MEALS, Disp: 180 tablet, Rfl: 3  glimepiride 4 MG Oral Tab, Location:  Right arm    Pulse:  88    Resp:  20    Temp:  98 °F (36.7 °C)    TempSrc:  Oral    SpO2:  98%    Weight: 139 lb (63 kg)  140 lb (63.5 kg)   Height: 5' 2.75\" (1.594 m)  5' 2\" (1.575 m)       Constitutional: NAD  Neuro: No gross deficits  Skin:

## 2020-09-02 NOTE — BRIEF OP NOTE
BRIEF PROCEDURE NOTE    PATIENT: Marisol Millan; DFE:[id number]; : 1945  DATE OF SURGERY: 2020    PREOP DIAGNOSIS:  right renal mass  POSTOP DIAGNOSIS:  same  PROCEDURE:  robotic assisted laparoscopic right radical nephrectomy and lysis of

## 2020-09-02 NOTE — ANESTHESIA PROCEDURE NOTES
Arterial Line  Performed by: Teagan Martin MD  Authorized by: Teagan Martin MD     General Information and Staff    Procedure Start:  9/2/2020 7:38 AM  Procedure End:  9/2/2020 7:42 AM  Anesthesiologist:  Teagan Martin MD  Performed By:  Ne Gill

## 2020-09-02 NOTE — PROGRESS NOTES
[facility]    Progress Note    Tati Jovany Patient Status:  Inpatient    1945 MRN [id number]   Location [facility] POST ANESTHESIA CARE UNIT Attending Alex Ramachandran MD   Hosp Day # 0 PCP Galileo Orta MD Hyperlipidemia  CONT HOME MEDS. Type 2 diabetes mellitus without complication, with long-term current use of insulin (HCC)  HOLD ORAL MEDS, CONT INSULIN, MONITOR ACCU CHECKS. Stage 3 chronic kidney disease ([facility] [address]  MONITOR POST OP. Pauline Cook

## 2020-09-02 NOTE — ANESTHESIA PREPROCEDURE EVALUATION
Anesthesia PreOp Note    HPI:     Rhonda Mercado is a 66year old female who presents for preoperative consultation requested by:  Tejinder Arshad MD    Date of Surgery: 9/2/2020    Procedure(s):  XI ROBOT-ASSISTED LAPAROSCOPIC NEPHRECTOMY  Indication insulin detemir (LEVEMIR FLEXTOUCH) 100 UNIT/ML Subcutaneous Solution Pen-injector, Inject 16 Units into the skin daily. , Disp: 15 mL, Rfl: 3, 9/1/2020 at 0900  simvastatin (ZOCOR) 5 MG Oral Tab, Take 1 tablet (5 mg total) by mouth daily. , Disp: 90 tablet,  age 78   • Hypertension Brother    • Other (1 brother) Other         living and well   • Other (1 son, 1 daughter) Other    • Breast Cancer Neg      Social History    Socioeconomic History      Marital status:       Spouse name: Not on paco Back Care: Not Asked        Exercise: Not Asked        Bike Helmet: Not Asked        Seat Belt: Not Asked        Self-Exams: Not Asked    Social History Narrative      Not on file      Available pre-op labs reviewed.   Lab Results   Component Value Da 8/2020 nuclear perfusion test showed small fixed apical perfusion defect    Neuro/Psych - negative ROS   (-) seizures, CVA    GI/Hepatic/Renal    (+) chronic renal disease CRI,   (-) liver disease    Endo/Other    (+) diabetes mellitus type 2 well controll

## 2020-09-02 NOTE — ANESTHESIA POSTPROCEDURE EVALUATION
Patient: Miguel Eduardo    Procedure Summary     Date:  09/02/20 Room / Location:  [facility] OR 07 / [facility] OR    Anesthesia Start:  8387 Anesthesia Stop:  0941    Procedure:  XI ROBOT-ASSISTED LAPAROSCOPIC NEPHRECTOMY (Right ) Diagnosis:  (right erica

## 2020-09-03 VITALS
HEIGHT: 62 IN | DIASTOLIC BLOOD PRESSURE: 60 MMHG | BODY MASS INDEX: 25.76 KG/M2 | RESPIRATION RATE: 18 BRPM | TEMPERATURE: 98 F | OXYGEN SATURATION: 94 % | WEIGHT: 140 LBS | SYSTOLIC BLOOD PRESSURE: 149 MMHG | HEART RATE: 82 BPM

## 2020-09-03 LAB
ANION GAP SERPL CALC-SCNC: 8 MMOL/L (ref 0–18)
BASOPHILS # BLD AUTO: 0.03 X10(3) UL (ref 0–0.2)
BASOPHILS NFR BLD AUTO: 0.3 %
BUN BLD-MCNC: 17 MG/DL (ref 7–18)
BUN/CREAT SERPL: 9.4 (ref 10–20)
CALCIUM BLD-MCNC: 8.8 MG/DL (ref 8.5–10.1)
CHLORIDE SERPL-SCNC: 105 MMOL/L (ref 98–112)
CO2 SERPL-SCNC: 25 MMOL/L (ref 21–32)
CREAT BLD-MCNC: 1.81 MG/DL (ref 0.55–1.02)
DEPRECATED RDW RBC AUTO: 41.4 FL (ref 35.1–46.3)
EOSINOPHIL # BLD AUTO: 0.03 X10(3) UL (ref 0–0.7)
EOSINOPHIL NFR BLD AUTO: 0.3 %
ERYTHROCYTE [DISTWIDTH] IN BLOOD BY AUTOMATED COUNT: 13 % (ref 11–15)
GLUCOSE BLD-MCNC: 119 MG/DL (ref 70–99)
GLUCOSE BLDC GLUCOMTR-MCNC: 129 MG/DL (ref 70–99)
GLUCOSE BLDC GLUCOMTR-MCNC: 130 MG/DL (ref 70–99)
HCT VFR BLD AUTO: 27.8 % (ref 35–48)
HGB BLD-MCNC: 9.8 G/DL (ref 12–16)
IMM GRANULOCYTES # BLD AUTO: 0.04 X10(3) UL (ref 0–1)
IMM GRANULOCYTES NFR BLD: 0.4 %
LYMPHOCYTES # BLD AUTO: 1.2 X10(3) UL (ref 1–4)
LYMPHOCYTES NFR BLD AUTO: 12.1 %
MCH RBC QN AUTO: 30.9 PG (ref 26–34)
MCHC RBC AUTO-ENTMCNC: 35.3 G/DL (ref 31–37)
MCV RBC AUTO: 87.7 FL (ref 80–100)
MONOCYTES # BLD AUTO: 0.77 X10(3) UL (ref 0.1–1)
MONOCYTES NFR BLD AUTO: 7.7 %
NEUTROPHILS # BLD AUTO: 7.87 X10 (3) UL (ref 1.5–7.7)
NEUTROPHILS # BLD AUTO: 7.87 X10(3) UL (ref 1.5–7.7)
NEUTROPHILS NFR BLD AUTO: 79.2 %
OSMOLALITY SERPL CALC.SUM OF ELEC: 289 MOSM/KG (ref 275–295)
PLATELET # BLD AUTO: 225 10(3)UL (ref 150–450)
POTASSIUM SERPL-SCNC: 4 MMOL/L (ref 3.5–5.1)
RBC # BLD AUTO: 3.17 X10(6)UL (ref 3.8–5.3)
SODIUM SERPL-SCNC: 138 MMOL/L (ref 136–145)
WBC # BLD AUTO: 9.9 X10(3) UL (ref 4–11)

## 2020-09-03 PROCEDURE — 99239 HOSP IP/OBS DSCHRG MGMT >30: CPT | Performed by: HOSPITALIST

## 2020-09-03 RX ORDER — POLYETHYLENE GLYCOL 3350 17 G/17G
17 POWDER, FOR SOLUTION ORAL DAILY PRN
Refills: 0 | Status: SHIPPED | COMMUNITY
Start: 2020-09-03 | End: 2020-09-18

## 2020-09-03 RX ORDER — HYDROCODONE BITARTRATE AND ACETAMINOPHEN 5; 325 MG/1; MG/1
1 TABLET ORAL EVERY 6 HOURS PRN
Qty: 20 TABLET | Refills: 0 | Status: SHIPPED | OUTPATIENT
Start: 2020-09-03 | End: 2020-09-18

## 2020-09-03 NOTE — PROGRESS NOTES
[facility] ADULT PROGRESS NOTE    24 HOUR EVENTS  No acute events overnight. SUBJECTIVE  Pain controlled. Tolerating diet. No N/V/F/C.       OBJECTIVE    Vital signs:    09/02/20  1500 09/02/20  2059 09/03/20  0419 09/03/20  0818   BP:  149/65 144/54

## 2020-09-03 NOTE — PLAN OF CARE
Patient A&Ox4. Up with standby assist with walker. Patient walked in palmer once during shift. Clear liquid diet. Some nausea reported, controlled with zofran. Winslow in place. Pain controlled with PRN Dilaudid. Continuing IV antibiotics.  Abdominal incisions Problem: RESPIRATORY - ADULT  Goal: Achieves optimal ventilation and oxygenation  Description  INTERVENTIONS:  - Assess for changes in respiratory status  - Assess for changes in mentation and behavior  - Position to facilitate oxygenation and minimize r wound bed, drain sites and surrounding tissue  - Implement wound care per orders  - Initiate isolation precautions as appropriate  - Initiate Pressure Ulcer prevention bundle as indicated  Outcome: Progressing

## 2020-09-04 ENCOUNTER — PATIENT OUTREACH (OUTPATIENT)
Dept: CASE MANAGEMENT | Age: 75
End: 2020-09-04

## 2020-09-04 DIAGNOSIS — Z02.9 ENCOUNTERS FOR UNSPECIFIED ADMINISTRATIVE PURPOSE: ICD-10-CM

## 2020-09-04 PROCEDURE — 1111F DSCHRG MED/CURRENT MED MERGE: CPT

## 2020-09-04 NOTE — PROGRESS NOTES
Initial Post Discharge Follow Up   Discharge Date: 9/3/20  Contact Date: 9/4/2020    Consent Verification:  Assessment Completed With: Patient  HIPAA Verified?   Yes    Discharge Dx:    S/p R nephrectomy    Was TCC ordered: no    General:   • How have yo Does not apply Misc USE DAILY WITH INSULIN 100 each 1   • insulin detemir (LEVEMIR FLEXTOUCH) 100 UNIT/ML Subcutaneous Solution Pen-injector Inject 16 Units into the skin daily.  15 mL 3   • Glucose Blood (FREESTYLE LITE TEST) In Vitro Strip TEST BLOOD SUGA you made all of your follow up appointments? no, pt states she will be calling  to set up [facility]. Is there any reason as to why you cannot make your appointments?    No     NCM Reviewed upcoming Specialist Appt with patient     Not Applicable

## 2020-09-05 LAB — BLOOD TYPE BARCODE: 5100

## 2020-09-08 ENCOUNTER — LAB ENCOUNTER (OUTPATIENT)
Dept: LAB | Age: 75
End: 2020-09-08
Attending: HOSPITALIST
Payer: MEDICARE

## 2020-09-08 DIAGNOSIS — N18.30 STAGE 3 CHRONIC KIDNEY DISEASE (HCC): ICD-10-CM

## 2020-09-08 DIAGNOSIS — E11.9 TYPE 2 DIABETES MELLITUS WITHOUT COMPLICATION, WITHOUT LONG-TERM CURRENT USE OF INSULIN (HCC): ICD-10-CM

## 2020-09-08 DIAGNOSIS — N28.89 RIGHT RENAL MASS: ICD-10-CM

## 2020-09-08 LAB
ALBUMIN SERPL-MCNC: 3.2 G/DL (ref 3.4–5)
ALBUMIN/GLOB SERPL: 0.9 {RATIO} (ref 1–2)
ALP LIVER SERPL-CCNC: 72 U/L (ref 55–142)
ALT SERPL-CCNC: 15 U/L (ref 13–56)
ANION GAP SERPL CALC-SCNC: 8 MMOL/L (ref 0–18)
AST SERPL-CCNC: 18 U/L (ref 15–37)
BASOPHILS # BLD AUTO: 0.06 X10(3) UL (ref 0–0.2)
BASOPHILS NFR BLD AUTO: 0.5 %
BILIRUB SERPL-MCNC: 0.6 MG/DL (ref 0.1–2)
BUN BLD-MCNC: 17 MG/DL (ref 7–18)
BUN/CREAT SERPL: 10.8 (ref 10–20)
CALCIUM BLD-MCNC: 9.3 MG/DL (ref 8.5–10.1)
CHLORIDE SERPL-SCNC: 107 MMOL/L (ref 98–112)
CHOLEST SMN-MCNC: 137 MG/DL (ref ?–200)
CO2 SERPL-SCNC: 26 MMOL/L (ref 21–32)
CREAT BLD-MCNC: 1.58 MG/DL (ref 0.55–1.02)
DEPRECATED RDW RBC AUTO: 43.1 FL (ref 35.1–46.3)
EOSINOPHIL # BLD AUTO: 0.22 X10(3) UL (ref 0–0.7)
EOSINOPHIL NFR BLD AUTO: 1.9 %
ERYTHROCYTE [DISTWIDTH] IN BLOOD BY AUTOMATED COUNT: 13 % (ref 11–15)
EST. AVERAGE GLUCOSE BLD GHB EST-MCNC: 137 MG/DL (ref 68–126)
GLOBULIN PLAS-MCNC: 3.5 G/DL (ref 2.8–4.4)
GLUCOSE BLD-MCNC: 140 MG/DL (ref 70–99)
HBA1C MFR BLD HPLC: 6.4 % (ref ?–5.7)
HCT VFR BLD AUTO: 33.1 % (ref 35–48)
HDLC SERPL-MCNC: 37 MG/DL (ref 40–59)
HGB BLD-MCNC: 11.2 G/DL (ref 12–16)
IMM GRANULOCYTES # BLD AUTO: 0.04 X10(3) UL (ref 0–1)
IMM GRANULOCYTES NFR BLD: 0.4 %
LDLC SERPL CALC-MCNC: 56 MG/DL (ref ?–100)
LYMPHOCYTES # BLD AUTO: 1.03 X10(3) UL (ref 1–4)
LYMPHOCYTES NFR BLD AUTO: 9 %
M PROTEIN MFR SERPL ELPH: 6.7 G/DL (ref 6.4–8.2)
MCH RBC QN AUTO: 30.9 PG (ref 26–34)
MCHC RBC AUTO-ENTMCNC: 33.8 G/DL (ref 31–37)
MCV RBC AUTO: 91.2 FL (ref 80–100)
MONOCYTES # BLD AUTO: 0.73 X10(3) UL (ref 0.1–1)
MONOCYTES NFR BLD AUTO: 6.4 %
NEUTROPHILS # BLD AUTO: 9.32 X10 (3) UL (ref 1.5–7.7)
NEUTROPHILS # BLD AUTO: 9.32 X10(3) UL (ref 1.5–7.7)
NEUTROPHILS NFR BLD AUTO: 81.8 %
NONHDLC SERPL-MCNC: 100 MG/DL (ref ?–130)
OSMOLALITY SERPL CALC.SUM OF ELEC: 296 MOSM/KG (ref 275–295)
PATIENT FASTING Y/N/NP: YES
PATIENT FASTING Y/N/NP: YES
PHOSPHATE SERPL-MCNC: 3.7 MG/DL (ref 2.5–4.9)
PLATELET # BLD AUTO: 327 10(3)UL (ref 150–450)
POTASSIUM SERPL-SCNC: 4.2 MMOL/L (ref 3.5–5.1)
RBC # BLD AUTO: 3.63 X10(6)UL (ref 3.8–5.3)
SODIUM SERPL-SCNC: 141 MMOL/L (ref 136–145)
TRIGL SERPL-MCNC: 218 MG/DL (ref 30–149)
VLDLC SERPL CALC-MCNC: 44 MG/DL (ref 0–30)
WBC # BLD AUTO: 11.4 X10(3) UL (ref 4–11)

## 2020-09-08 PROCEDURE — 84100 ASSAY OF PHOSPHORUS: CPT

## 2020-09-08 PROCEDURE — 83036 HEMOGLOBIN GLYCOSYLATED A1C: CPT

## 2020-09-08 PROCEDURE — 85025 COMPLETE CBC W/AUTO DIFF WBC: CPT

## 2020-09-08 PROCEDURE — 80053 COMPREHEN METABOLIC PANEL: CPT

## 2020-09-08 PROCEDURE — 36415 COLL VENOUS BLD VENIPUNCTURE: CPT

## 2020-09-08 PROCEDURE — 80061 LIPID PANEL: CPT

## 2020-09-08 NOTE — OPERATIVE REPORT
[facility]    PATIENT'S NAME: Cierra Darien   ATTENDING PHYSICIAN: Misha Baca MD   OPERATING PHYSICIAN: Masrha Robles MD   PATIENT ACCOUNT#:   [id number]    LOCATION:  [address] [place] [address] #:   [id number]       DATE OF into the incision without difficulty. I then advanced the camera and immediately encountered multiple dense adhesions throughout the right upper quadrant.   I was able to safely place my 2 lower robotic ports and using the 3 robotic ports in a laparoscopic proceeded to have my assistant use the laparoscopic Ligasure to carry the dissection superiorly. This did require significant exposure of the liver and I used robotic dissection to clear adhesions of the liver to the superior aspect of the kidney.   Gita Mcginnis

## 2020-09-08 NOTE — PROGRESS NOTES
Corky Johnson is a 66year old female who presents for     Here with her daughter Paola Jeff    TCM visit:  Hospitalized 9/2–9/3/20. Had robotic asissted R radical nephrectomy 9/2/20 Dr Deborah Salcido.   Pathology– renal cell carcinoma 12.0 cm in greatest dimen • NEPHRECTOMY Right 09/02/2020     robotic asissted R radical nephrectomy 9/2/20 Dr Yonatan Conte.    • XI ROBOT-ASSISTED LAPAROSCOPIC NEPHRECTOMY Right 9/2/2020    Performed by Waleska Cook MD at [facility] MAIN OR      Family History   Problem Relation Age of nephrectomy, right  Renal cell carcinoma of right kidney [facility])  Had robotic asissted R radical nephrectomy 9/2/20 Dr Melissa Oviedo. Pathology– renal cell carcinoma measuring 12.0 cm in greatest dimensions, confined to the kidney. Margins free.   Oncologist rec Teresita-diverticulosis, 3 mark polyps, lipoma hepatic flexture. Next 3 yrs. Vacc: zstvx 9/13, shingrix disc 7/23/18, DT 8/10. pmvx 4/13. Prevnar 4/15. Flu shot 9/9/20.   Lifeline screening   9/26/18--carotids -mild R and L, no AF, No AAA, No PAD, Low prob of total) by mouth daily. 90 tablet 3   • LUMIGAN 0.01 % Ophthalmic Solution Place 1 drop into both eyes daily. Clotilde Benitez MD  9/9/2020       Etta Paniagua is a 66year old female here today for hospital follow up.    She was discharged fr

## 2020-09-09 ENCOUNTER — OFFICE VISIT (OUTPATIENT)
Dept: INTERNAL MEDICINE CLINIC | Facility: CLINIC | Age: 75
End: 2020-09-09
Payer: MEDICARE

## 2020-09-09 VITALS
OXYGEN SATURATION: 97 % | TEMPERATURE: 97 F | WEIGHT: 141.19 LBS | DIASTOLIC BLOOD PRESSURE: 70 MMHG | HEART RATE: 72 BPM | BODY MASS INDEX: 25.98 KG/M2 | HEIGHT: 62 IN | SYSTOLIC BLOOD PRESSURE: 130 MMHG

## 2020-09-09 DIAGNOSIS — Z90.5 S/P NEPHRECTOMY: Primary | ICD-10-CM

## 2020-09-09 DIAGNOSIS — C64.1 RENAL CELL CARCINOMA OF RIGHT KIDNEY (HCC): ICD-10-CM

## 2020-09-09 DIAGNOSIS — E11.9 TYPE 2 DIABETES MELLITUS WITHOUT COMPLICATION, WITH LONG-TERM CURRENT USE OF INSULIN (HCC): ICD-10-CM

## 2020-09-09 DIAGNOSIS — Z79.4 TYPE 2 DIABETES MELLITUS WITHOUT COMPLICATION, WITH LONG-TERM CURRENT USE OF INSULIN (HCC): ICD-10-CM

## 2020-09-09 DIAGNOSIS — M10.9 ACUTE GOUT INVOLVING TOE OF RIGHT FOOT, UNSPECIFIED CAUSE: ICD-10-CM

## 2020-09-09 DIAGNOSIS — N18.30 STAGE 3 CHRONIC KIDNEY DISEASE (HCC): ICD-10-CM

## 2020-09-09 PROCEDURE — 99495 TRANSJ CARE MGMT MOD F2F 14D: CPT | Performed by: INTERNAL MEDICINE

## 2020-09-09 RX ORDER — PREDNISONE 10 MG/1
TABLET ORAL
Qty: 7 TABLET | Refills: 0 | Status: SHIPPED | OUTPATIENT
Start: 2020-09-09 | End: 2020-09-16

## 2020-09-11 NOTE — TELEPHONE ENCOUNTER
Current refill request refused due to refill is either a duplicate request or has active refills at the pharmacy. Check previous templates.     Requested Prescriptions     Refused Prescriptions Disp Refills   • insulin detemir 100 UNIT/ML Subcutaneous

## 2020-09-12 NOTE — DISCHARGE SUMMARY
[place] HOSPITALIST  DISCHARGE SUMMARY     Alvaro Waller Patient Status:  Inpatient    1945 MRN [id number]   Location [facility] 4W/SW/SE Attending No att. providers found   [facility] Day # 1 PCP Klever Marks MD     Date of Admission: Take 1 tablet (10 mg total) by mouth daily. Quantity:  90 tablet  Refills:  3     Enalapril Maleate 20 MG Tabs  Commonly known as:  Vasotec      Take 1 tablet (20 mg total) by mouth daily.    Quantity:  90 tablet  Refills:  3     FreeStyle Lite Test [address] extremities. Extremities: No edema.   -----------------------------------------------------------------------------------------------  PATIENT DISCHARGE INSTRUCTIONS: See electronic chart      Hospital Discharge Diagnoses: s/p nephrectomy    Lace+ Score: 3

## 2020-09-16 ENCOUNTER — LAB ENCOUNTER (OUTPATIENT)
Dept: LAB | Age: 75
End: 2020-09-16
Attending: INTERNAL MEDICINE
Payer: MEDICARE

## 2020-09-16 ENCOUNTER — TELEPHONE (OUTPATIENT)
Dept: INTERNAL MEDICINE CLINIC | Facility: CLINIC | Age: 75
End: 2020-09-16

## 2020-09-16 DIAGNOSIS — M10.9 ACUTE GOUT INVOLVING TOE OF RIGHT FOOT, UNSPECIFIED CAUSE: ICD-10-CM

## 2020-09-16 LAB — URATE SERPL-MCNC: 6.8 MG/DL (ref 2.6–6)

## 2020-09-16 PROCEDURE — 36415 COLL VENOUS BLD VENIPUNCTURE: CPT

## 2020-09-16 PROCEDURE — 84550 ASSAY OF BLOOD/URIC ACID: CPT

## 2020-09-16 RX ORDER — PREDNISONE 10 MG/1
TABLET ORAL
Qty: 7 TABLET | Refills: 0 | Status: SHIPPED | OUTPATIENT
Start: 2020-09-16 | End: 2020-10-19 | Stop reason: ALTCHOICE

## 2020-09-16 NOTE — TELEPHONE ENCOUNTER
Per Dr. Avery Doing 9/9/20 office visit note:  Acute gout involving toe of right foot, unspecified cause  Avoid NSAIDs due to RI. Add prednisone 20 mg daily for 2 d, then 10 mg daily for 3 d. Uric acid level--added to lab specimen of yest 9/8/20.    Discus

## 2020-09-16 NOTE — TELEPHONE ENCOUNTER
To nursing. Please tell pt I sent refill to [facility] in [place] for prednisone 20 mg x 2 d, 10 mg x 3 d. Go to lab for uric acid level. Order that was entered 9/9/20 is still good. Thanks. (lab didn't add on uric acid level to lab of 9/8/20).

## 2020-09-16 NOTE — TELEPHONE ENCOUNTER
Message relayed to patient who verbalized understanding. Transferred to  to schedule appt with the lab. Nothing further at this time.

## 2020-09-17 ENCOUNTER — TELEPHONE (OUTPATIENT)
Dept: INTERNAL MEDICINE CLINIC | Facility: CLINIC | Age: 75
End: 2020-09-17

## 2020-09-17 NOTE — TELEPHONE ENCOUNTER
To nursing, please tell pt uric acid level is a little elevated--supports the diagnosis of gout. Once over the acute episode, we can discuss a prevention medication. Thanks. Note to self--9/16/20 -uric acid level is elev 6.8 (ref range 2.6-6.0).

## 2020-09-18 ENCOUNTER — OFFICE VISIT (OUTPATIENT)
Dept: INTERNAL MEDICINE CLINIC | Facility: CLINIC | Age: 75
End: 2020-09-18
Payer: MEDICARE

## 2020-09-18 VITALS
HEIGHT: 62 IN | SYSTOLIC BLOOD PRESSURE: 156 MMHG | DIASTOLIC BLOOD PRESSURE: 68 MMHG | TEMPERATURE: 98 F | WEIGHT: 139 LBS | HEART RATE: 76 BPM | BODY MASS INDEX: 25.58 KG/M2

## 2020-09-18 DIAGNOSIS — E11.9 TYPE 2 DIABETES MELLITUS WITHOUT COMPLICATION, WITH LONG-TERM CURRENT USE OF INSULIN (HCC): ICD-10-CM

## 2020-09-18 DIAGNOSIS — C64.1 RENAL CELL CARCINOMA OF RIGHT KIDNEY (HCC): ICD-10-CM

## 2020-09-18 DIAGNOSIS — Z79.4 TYPE 2 DIABETES MELLITUS WITHOUT COMPLICATION, WITH LONG-TERM CURRENT USE OF INSULIN (HCC): ICD-10-CM

## 2020-09-18 DIAGNOSIS — I10 ESSENTIAL HYPERTENSION: ICD-10-CM

## 2020-09-18 DIAGNOSIS — Z90.5 S/P NEPHRECTOMY: ICD-10-CM

## 2020-09-18 DIAGNOSIS — M10.9 ACUTE GOUT INVOLVING TOE OF RIGHT FOOT, UNSPECIFIED CAUSE: Primary | ICD-10-CM

## 2020-09-18 PROCEDURE — G0008 ADMIN INFLUENZA VIRUS VAC: HCPCS | Performed by: INTERNAL MEDICINE

## 2020-09-18 PROCEDURE — G0463 HOSPITAL OUTPT CLINIC VISIT: HCPCS | Performed by: INTERNAL MEDICINE

## 2020-09-18 PROCEDURE — 99213 OFFICE O/P EST LOW 20 MIN: CPT | Performed by: INTERNAL MEDICINE

## 2020-09-18 PROCEDURE — 90662 IIV NO PRSV INCREASED AG IM: CPT | Performed by: INTERNAL MEDICINE

## 2020-09-18 RX ORDER — ASPIRIN 81 MG/1
81 TABLET ORAL DAILY
Refills: 0 | COMMUNITY
Start: 2020-09-18

## 2020-09-18 RX ORDER — HYDRALAZINE HYDROCHLORIDE 10 MG/1
10 TABLET, FILM COATED ORAL 2 TIMES DAILY
Qty: 180 TABLET | Refills: 3 | Status: SHIPPED | OUTPATIENT
Start: 2020-09-18 | End: 2021-09-01

## 2020-09-18 NOTE — PROGRESS NOTES
Alvaro Waller is a 66year old female who presents for     Here with her daughter Sunita Liz at 9 days. Gout --R 1st toe--was 90% better after last dose prednisone this wk.    Then pain got worse again 2 days ago and restarted pred and now \"j Relation Age of Onset   • Stroke Father          age [de-identified]   • Other (unknown cause of death) Mother          age 80   • Hypertension Brother    • Other (1 brother) Other         living and well   • Other (1 son, 1 daughter) Other    • Breast Cancer N 8 mg q am, levimir 24 u/d. Discussed with RI, may need to also get off amaryl. Doing ok now. decl Endocrinologist.  Ashely Arias diab ed refresher. Diet, ex--wt loss. ophtho Dr Shelbie Lundberg.      Acute gout involving toe of right foot  At 9/9/20 visit, rx prednis normal.  9/8/20–cbc cmp lipid A1c phos (CBC, phos per hospitalist)– Hgb 11.2 creat 1.58 GFR 32 LDL 56  A1c 6.4 phosphorus normal at 3.7.     Ret in 1 mo Call sooner prn.      Patient expresses understanding of above issues and plan. her dtr César Armstrong, to

## 2020-10-19 ENCOUNTER — OFFICE VISIT (OUTPATIENT)
Dept: INTERNAL MEDICINE CLINIC | Facility: CLINIC | Age: 75
End: 2020-10-19
Payer: MEDICARE

## 2020-10-19 ENCOUNTER — TELEPHONE (OUTPATIENT)
Dept: INTERNAL MEDICINE CLINIC | Facility: CLINIC | Age: 75
End: 2020-10-19

## 2020-10-19 VITALS
HEART RATE: 98 BPM | SYSTOLIC BLOOD PRESSURE: 114 MMHG | OXYGEN SATURATION: 99 % | HEIGHT: 62 IN | BODY MASS INDEX: 25.8 KG/M2 | TEMPERATURE: 99 F | WEIGHT: 140.19 LBS | DIASTOLIC BLOOD PRESSURE: 60 MMHG

## 2020-10-19 DIAGNOSIS — I10 ESSENTIAL HYPERTENSION: Primary | ICD-10-CM

## 2020-10-19 DIAGNOSIS — C64.1 RENAL CELL CARCINOMA OF RIGHT KIDNEY (HCC): ICD-10-CM

## 2020-10-19 DIAGNOSIS — Z79.4 TYPE 2 DIABETES MELLITUS WITHOUT COMPLICATION, WITH LONG-TERM CURRENT USE OF INSULIN (HCC): ICD-10-CM

## 2020-10-19 DIAGNOSIS — Z90.5 S/P NEPHRECTOMY: ICD-10-CM

## 2020-10-19 DIAGNOSIS — E11.9 TYPE 2 DIABETES MELLITUS WITHOUT COMPLICATION, WITH LONG-TERM CURRENT USE OF INSULIN (HCC): ICD-10-CM

## 2020-10-19 PROCEDURE — G0463 HOSPITAL OUTPT CLINIC VISIT: HCPCS | Performed by: INTERNAL MEDICINE

## 2020-10-19 PROCEDURE — 93010 ELECTROCARDIOGRAM REPORT: CPT | Performed by: INTERNAL MEDICINE

## 2020-10-19 PROCEDURE — 93005 ELECTROCARDIOGRAM TRACING: CPT | Performed by: INTERNAL MEDICINE

## 2020-10-19 PROCEDURE — 99213 OFFICE O/P EST LOW 20 MIN: CPT | Performed by: INTERNAL MEDICINE

## 2020-10-19 NOTE — TELEPHONE ENCOUNTER
To nursing, please tell patient EKG done at today's visit is okay–stable to July 2020 EKG. Her rhythm is normal and her heart rate is 75 (normal) on the EKG. Thanks. Note to self–  EKG today–SR, HR 75, LBBB–no change to EKG of 7/14/20.

## 2020-10-19 NOTE — PROGRESS NOTES
Beverly Cummings is a 66year old female who presents for     Check at 1 mo. HTN--rosy addn hydralazine 10 mg bid recommended at 9/18/20 visit. Pt note BPs doing good. Home BPs 120s to 130s over 50s to 60s. HRs 70.     DM --taking levimir 24 u/d and Other         living and well   • Other (1 son, 1 daughter) Other    • Breast Cancer Neg       Social History:   Social History    Tobacco Use      Smoking status: Former Smoker        Packs/day: 1.00        Years: 20.00        Pack years: 20        Quit d 8 mg q am, levimir 24 u/d. Metformin 1000 mg bid stopped in hosp 9/2020 for RI after R nephrectomy. Have discussed with RI, may need to go off amaryl–but doing okay now. decl Endocrinologist.  Nitin Dye diab ed refresher. Diet, ex--wt loss.    ophtho Dr Ulisses Garcias 3.7.    Lab orders ent at 10/19/20 visit-cbc cmp.     Ret in 2 mo Call sooner prn. Patient expresses understanding of above issues and plan. her dtr Laura Lou, too.   This is a 20 minute visit and greater than 50% of the time was spent counseling the patie

## 2020-10-25 NOTE — CONSULTS
[facility] History and Physical    Patient Name: Nayeli Nixon   YOB: 1945   Medical Record Number: [id number]   CSN: [id number]   Attending Physician:  Kalie Julian MD       Date of Visit: 10/26/2020     Chief Complaint:  [address]     His CATARACT EXTRACTION Bilateral 2014    Dr. Estela Monahan   • Ol[place]    at [facility]   • COLONOSCOPY, POSSIBLE BIOPSY, POSSIBLE POLYPECTOMY 90906 N/A 3/9/2018    Performed by Ronak Sandoval MD at [address] [place] [address]   • TODD LOCALIZATION Disp: 90 tablet, Rfl: 3  •  amLODIPine Besylate (NORVASC) 10 MG Oral Tab, Take 1 tablet (10 mg total) by mouth daily. , Disp: 90 tablet, Rfl: 3  •  LUMIGAN 0.01 % Ophthalmic Solution, Place 1 drop into both eyes daily. , Disp: , Rfl:     Allergies:    Dana Stoddard  (H) 09/08/2020    CA 9.3 09/08/2020    ALKPHO 72 09/08/2020    ALT 15 09/08/2020    AST 18 09/08/2020    BILT 0.6 09/08/2020    ALB 3.2 (L) 09/08/2020    TP 6.7 09/08/2020         Imaging, Labs and Pathology reviewed and discussed with the patient

## 2020-10-26 ENCOUNTER — OFFICE VISIT (OUTPATIENT)
Dept: HEMATOLOGY/ONCOLOGY | Facility: HOSPITAL | Age: 75
End: 2020-10-26
Attending: INTERNAL MEDICINE
Payer: MEDICARE

## 2020-10-26 VITALS
HEART RATE: 84 BPM | SYSTOLIC BLOOD PRESSURE: 154 MMHG | BODY MASS INDEX: 25.95 KG/M2 | DIASTOLIC BLOOD PRESSURE: 58 MMHG | RESPIRATION RATE: 16 BRPM | OXYGEN SATURATION: 99 % | TEMPERATURE: 99 F | HEIGHT: 62 IN | WEIGHT: 141 LBS

## 2020-10-26 DIAGNOSIS — Z90.5 S/P NEPHRECTOMY: ICD-10-CM

## 2020-10-26 DIAGNOSIS — C64.1 RENAL CELL CARCINOMA OF RIGHT KIDNEY (HCC): Primary | ICD-10-CM

## 2020-10-26 PROCEDURE — 99204 OFFICE O/P NEW MOD 45 MIN: CPT | Performed by: INTERNAL MEDICINE

## 2020-11-07 DIAGNOSIS — E78.2 HYPERLIPIDEMIA, MIXED: ICD-10-CM

## 2020-11-07 DIAGNOSIS — I10 ESSENTIAL HYPERTENSION: ICD-10-CM

## 2020-11-07 DIAGNOSIS — E11.9 TYPE 2 DIABETES MELLITUS WITHOUT COMPLICATION, WITHOUT LONG-TERM CURRENT USE OF INSULIN (HCC): ICD-10-CM

## 2020-11-09 RX ORDER — AMLODIPINE BESYLATE 10 MG/1
10 TABLET ORAL DAILY
Qty: 90 TABLET | Refills: 3 | Status: SHIPPED | OUTPATIENT
Start: 2020-11-09 | End: 2021-11-22

## 2020-11-11 ENCOUNTER — LAB ENCOUNTER (OUTPATIENT)
Dept: LAB | Age: 75
End: 2020-11-11
Attending: INTERNAL MEDICINE
Payer: MEDICARE

## 2020-11-11 DIAGNOSIS — I10 ESSENTIAL HYPERTENSION: ICD-10-CM

## 2020-11-11 PROCEDURE — 80053 COMPREHEN METABOLIC PANEL: CPT

## 2020-11-11 PROCEDURE — 36415 COLL VENOUS BLD VENIPUNCTURE: CPT

## 2020-11-11 PROCEDURE — 85025 COMPLETE CBC W/AUTO DIFF WBC: CPT

## 2020-11-12 ENCOUNTER — TELEPHONE (OUTPATIENT)
Dept: INTERNAL MEDICINE CLINIC | Facility: CLINIC | Age: 75
End: 2020-11-12

## 2020-11-12 NOTE — TELEPHONE ENCOUNTER
I called pt and discussed lab results. Renal fcn low GFR 29. Has solitary kidney. I recom she see nephrology--Dr Osiel Prasad, Dr Jeff Dye. .   Lab 11/11/20--cbc cmp--Hgb 11.7, up from 11.2 on 9/8/20. creat 1.68 GFR 29. Keep appt w me 12/21/20.   Pt expressed un

## 2020-11-24 ENCOUNTER — OFFICE VISIT (OUTPATIENT)
Dept: HEMATOLOGY/ONCOLOGY | Facility: HOSPITAL | Age: 75
End: 2020-11-24
Attending: INTERNAL MEDICINE
Payer: MEDICARE

## 2020-11-24 DIAGNOSIS — Z71.9 COUNSELING, UNSPECIFIED: ICD-10-CM

## 2020-11-24 DIAGNOSIS — Z08 ENCOUNTER FOR FOLLOW-UP EXAMINATION AFTER COMPLETED TREATMENT FOR MALIGNANT NEOPLASM: ICD-10-CM

## 2020-11-24 DIAGNOSIS — C64.1 RENAL CELL CARCINOMA OF RIGHT KIDNEY (HCC): Primary | ICD-10-CM

## 2020-11-24 PROCEDURE — 99215 OFFICE O/P EST HI 40 MIN: CPT | Performed by: NURSE PRACTITIONER

## 2020-11-24 NOTE — PROGRESS NOTES
I met with Godwin Ramón for a Survivorship Clinic visit to provide a survivorship care plan (SCP) and information related to post-treatment care. She has a diagnosis of Stage II right clear cell renal cancer.   This was treated with a robotic assisted laparoscop possible late and long-term effects related to the treatment that was received. Reviewed common cancer survivor issues and resources available.   She denies having any current issues and was provided some survivorship resources for use going forward (s Summary SCP and in the additional resources provided.   Hodan Edouard, CANDY

## 2020-12-18 RX ORDER — PEN NEEDLE, DIABETIC 31 GX5/16"
NEEDLE, DISPOSABLE MISCELLANEOUS
Qty: 100 EACH | Refills: 3 | Status: SHIPPED | OUTPATIENT
Start: 2020-12-18 | End: 2021-03-23

## 2020-12-21 ENCOUNTER — OFFICE VISIT (OUTPATIENT)
Dept: INTERNAL MEDICINE CLINIC | Facility: CLINIC | Age: 75
End: 2020-12-21
Payer: MEDICARE

## 2020-12-21 VITALS
HEIGHT: 62 IN | SYSTOLIC BLOOD PRESSURE: 124 MMHG | BODY MASS INDEX: 26.5 KG/M2 | HEART RATE: 76 BPM | WEIGHT: 144 LBS | TEMPERATURE: 98 F | DIASTOLIC BLOOD PRESSURE: 70 MMHG

## 2020-12-21 DIAGNOSIS — N18.4 CKD (CHRONIC KIDNEY DISEASE), STAGE IV (HCC): ICD-10-CM

## 2020-12-21 DIAGNOSIS — E11.9 TYPE 2 DIABETES MELLITUS WITHOUT COMPLICATION, WITHOUT LONG-TERM CURRENT USE OF INSULIN (HCC): Primary | ICD-10-CM

## 2020-12-21 DIAGNOSIS — Z90.5 S/P NEPHRECTOMY: ICD-10-CM

## 2020-12-21 DIAGNOSIS — I10 ESSENTIAL HYPERTENSION: ICD-10-CM

## 2020-12-21 PROCEDURE — G0463 HOSPITAL OUTPT CLINIC VISIT: HCPCS | Performed by: INTERNAL MEDICINE

## 2020-12-21 PROCEDURE — 99213 OFFICE O/P EST LOW 20 MIN: CPT | Performed by: INTERNAL MEDICINE

## 2020-12-21 RX ORDER — INSULIN DETEMIR 100 [IU]/ML
25 INJECTION, SOLUTION SUBCUTANEOUS DAILY
Qty: 15 ML | Refills: 3 | COMMUNITY
Start: 2020-12-21 | End: 2021-01-13

## 2020-12-21 NOTE — PROGRESS NOTES
Corine Soni is a 66year old female who presents for     Check at 2 mo. HTN--home BPs 130s over 60s. HRs 70s. DM --taking levimir 23 (not 24) u/d and amaryl 8 mg daily. . Sugars \"little higher lately\"--up to 160s to 170s.    Metformin was sto 63696 N/A 3/9/2018    Performed by Ken Bedolla MD at [facility]   • TODD LOCALIZATION WIRE 1 SITE LEFT (GWD=67526)  1998   • NEPHRECTOMY Right 09/02/2020     robotic asissted R radical nephrectomy 9/2/20 Dr Eufemia Weathers.    • XI ROBOT-ASSISTED Heart-RR, S1S2 normal without murmur  Abdomen: soft, nontender without mass or HSM or 10/19/20 visit  Extremities: no edema,    Neurologic: alert and oriented      ASSESSMENT AND PLAN:     Diabetes mellitus-A1c  6.4 on 9/8/20-- amaryl 8 mg q am, incr lev visit-stress echo per Dr. Zara Hernandez 6/11/14- nl.   Carotid bruits--bilat. Lifeline screening 10/3/17-carotids mild bilat--> mild bilat carotids on Lifeline 9/26/18. Healthcare maintenance:   Mammo 8/1/20-ok    Dexa-3/5/18-nl.     Pap neg 4/13 here.    colo TAKE 2 TABLETS (8MG TOTAL) EVERY MORNING BEFORE       BREAKFAST 180 tablet 3   • Enalapril Maleate (VASOTEC) 20 MG Oral Tab Take 1 tablet (20 mg total) by mouth daily. 90 tablet 3   • LUMIGAN 0.01 % Ophthalmic Solution Place 1 drop into both eyes daily.

## 2021-01-13 ENCOUNTER — TELEPHONE (OUTPATIENT)
Dept: INTERNAL MEDICINE CLINIC | Facility: CLINIC | Age: 76
End: 2021-01-13

## 2021-01-13 RX ORDER — INSULIN DETEMIR 100 [IU]/ML
30 INJECTION, SOLUTION SUBCUTANEOUS DAILY
Qty: 15 ML | Refills: 3 | COMMUNITY
Start: 2021-01-13 | End: 2021-01-21

## 2021-01-13 NOTE — TELEPHONE ENCOUNTER
To nursing, please tell patient her  blood sugars are still high–increase Levemir insulin up to 30 units daily. Call new home readings in 1 week. Thanks.       Note to self–  My notes at 12/21/20 visit:   Diabetes mellitus-A1c  6.4 on 9/8/20-- amaryl 8 mg

## 2021-01-13 NOTE — TELEPHONE ENCOUNTER
Pt notified per DR ABDIEL QUEZADA still high - to increase Levemir insulin up to 30 units daily    Call in new readings in 1 week    Pt understands instructions

## 2021-01-13 NOTE — TELEPHONE ENCOUNTER
Patient saw Dr Juancho Mcdonough on 12/21/2020 and was informed to call back with Blood Sugar readings:    12/23 - 154  12/24 - 190  12/25 - 156  12/26 - 183  12/27 - 171  12/28 - 199  12/29 - 190  12/30 -   12/31 - 191  1/1 - 183  1/2 - , PM 90  1/3 - AM 19

## 2021-01-14 ENCOUNTER — HOSPITAL ENCOUNTER (OUTPATIENT)
Dept: MRI IMAGING | Facility: HOSPITAL | Age: 76
Discharge: HOME | End: 2021-01-14
Attending: UROLOGY
Payer: MEDICARE

## 2021-01-14 ENCOUNTER — HOSPITAL ENCOUNTER (OUTPATIENT)
Dept: GENERAL RADIOLOGY | Facility: HOSPITAL | Age: 76
Discharge: HOME | End: 2021-01-14
Attending: UROLOGY
Payer: MEDICARE

## 2021-01-14 ENCOUNTER — LAB ENCOUNTER (OUTPATIENT)
Dept: LAB | Facility: HOSPITAL | Age: 76
End: 2021-01-14
Attending: UROLOGY
Payer: MEDICARE

## 2021-01-14 DIAGNOSIS — Z85.528 PERSONAL HISTORY OF KIDNEY CANCER: ICD-10-CM

## 2021-01-14 DIAGNOSIS — C64.9 CANCER OF KIDNEY (HCC): ICD-10-CM

## 2021-01-14 DIAGNOSIS — C64.9 ADENOCARCINOMA, RENAL CELL (HCC): Primary | ICD-10-CM

## 2021-01-14 LAB
ALBUMIN SERPL-MCNC: 4 G/DL (ref 3.4–5)
ALBUMIN/GLOB SERPL: 1.2 {RATIO} (ref 1–2)
ALP LIVER SERPL-CCNC: 83 U/L
ALT SERPL-CCNC: 21 U/L
ANION GAP SERPL CALC-SCNC: 6 MMOL/L (ref 0–18)
AST SERPL-CCNC: 15 U/L (ref 15–37)
BILIRUB SERPL-MCNC: 0.4 MG/DL (ref 0.1–2)
BUN BLD-MCNC: 28 MG/DL (ref 7–18)
BUN/CREAT SERPL: 14.5 (ref 10–20)
CALCIUM BLD-MCNC: 9.1 MG/DL (ref 8.5–10.1)
CHLORIDE SERPL-SCNC: 109 MMOL/L (ref 98–112)
CO2 SERPL-SCNC: 25 MMOL/L (ref 21–32)
CREAT BLD-MCNC: 1.7 MG/DL (ref 0.55–1.02)
CREAT BLD-MCNC: 1.93 MG/DL
GLOBULIN PLAS-MCNC: 3.4 G/DL (ref 2.8–4.4)
GLUCOSE BLD-MCNC: 161 MG/DL (ref 70–99)
M PROTEIN MFR SERPL ELPH: 7.4 G/DL (ref 6.4–8.2)
OSMOLALITY SERPL CALC.SUM OF ELEC: 299 MOSM/KG (ref 275–295)
PATIENT FASTING Y/N/NP: NO
POTASSIUM SERPL-SCNC: 4.4 MMOL/L (ref 3.5–5.1)
SODIUM SERPL-SCNC: 140 MMOL/L (ref 136–145)

## 2021-01-14 PROCEDURE — 80053 COMPREHEN METABOLIC PANEL: CPT

## 2021-01-14 PROCEDURE — 74183 MRI ABD W/O CNTR FLWD CNTR: CPT | Performed by: UROLOGY

## 2021-01-14 PROCEDURE — 82565 ASSAY OF CREATININE: CPT

## 2021-01-14 PROCEDURE — A9575 INJ GADOTERATE MEGLUMI 0.1ML: HCPCS | Performed by: UROLOGY

## 2021-01-14 PROCEDURE — 36415 COLL VENOUS BLD VENIPUNCTURE: CPT

## 2021-01-14 PROCEDURE — 71046 X-RAY EXAM CHEST 2 VIEWS: CPT | Performed by: UROLOGY

## 2021-01-21 RX ORDER — INSULIN DETEMIR 100 [IU]/ML
34 INJECTION, SOLUTION SUBCUTANEOUS DAILY
Qty: 15 ML | Refills: 3 | COMMUNITY
Start: 2021-01-21 | End: 2021-02-16

## 2021-01-21 NOTE — TELEPHONE ENCOUNTER
I spoke with Angelica Benitez and relayed Dr. Juani Andres message. She verbalized understanding. Invited her to call back with any questions or concerns.

## 2021-01-21 NOTE — TELEPHONE ENCOUNTER
To nursing, please tell patient sugars seem to be improving but not yet to goal.  Increase Levemir insulin dose to 34 units daily. Please let us know new sugars in 2 weeks. Thanks.

## 2021-01-21 NOTE — TELEPHONE ENCOUNTER
Patient calling with blood sugar readings per Dr. Puneet Benavides request.    1/14 196 Am  1/15 184 Am  1/16 144 Noon  1/17 158 5:30pm  1/18 198 Am   1/19 178 Am  1/20 105 Noon    Best call back number [phone]

## 2021-01-26 DIAGNOSIS — I10 ESSENTIAL HYPERTENSION: ICD-10-CM

## 2021-01-26 DIAGNOSIS — Z23 NEED FOR VACCINATION: ICD-10-CM

## 2021-01-26 DIAGNOSIS — E78.2 HYPERLIPIDEMIA, MIXED: ICD-10-CM

## 2021-01-26 DIAGNOSIS — E11.9 TYPE 2 DIABETES MELLITUS WITHOUT COMPLICATION, WITHOUT LONG-TERM CURRENT USE OF INSULIN (HCC): ICD-10-CM

## 2021-01-26 RX ORDER — SIMVASTATIN 5 MG
5 TABLET ORAL DAILY
Qty: 90 TABLET | Refills: 3 | Status: SHIPPED | OUTPATIENT
Start: 2021-01-26 | End: 2022-01-12

## 2021-01-28 RX ORDER — ENALAPRIL MALEATE 20 MG/1
20 TABLET ORAL DAILY
Qty: 90 TABLET | Refills: 3 | Status: SHIPPED | OUTPATIENT
Start: 2021-01-28 | End: 2021-04-26

## 2021-01-28 NOTE — TELEPHONE ENCOUNTER
CrCl right at rajiv 30; Max dose 40 mg so pt will continue  Reviewed and Rx done  Requested Prescriptions     Signed Prescriptions Disp Refills   • Enalapril Maleate 20 MG Oral Tab 90 tablet 3     Sig: Take 1 tablet (20 mg total) by mouth daily.      Author

## 2021-02-10 ENCOUNTER — IMMUNIZATION (OUTPATIENT)
Dept: LAB | Age: 76
End: 2021-02-10
Attending: HOSPITALIST
Payer: MEDICARE

## 2021-02-10 DIAGNOSIS — Z23 NEED FOR VACCINATION: Primary | ICD-10-CM

## 2021-02-10 PROCEDURE — 0001A SARSCOV2 VAC 30MCG/0.3ML IM: CPT

## 2021-02-11 NOTE — TELEPHONE ENCOUNTER
Pt called with readings as per Dr Caterina Jeffries request:    1/22 1:00PM 133  1/23 6:00PM 99  1/24 10:00PM 189  1/25 12:00PM 96  1/26 10:00PM 169  1/27 6:00PM 102  1/28 8:00PM 178  1/29 1:00PM 142  1/30 10:00AM 169  1/31 5:00PM 99  2/1 10:00PM 173  2/2 10:00AM

## 2021-02-12 NOTE — TELEPHONE ENCOUNTER
Sugars have been trending up for most recent sugar checks. Lets continue to have her check through the weekend. Recommend checking 1 fasting and 1 after meals to determine how best to adjust medications.  Would reinforce optimizing nutrition with a goal of

## 2021-02-12 NOTE — TELEPHONE ENCOUNTER
called patient and relayed DR. BECKFORD message - verbalized understanding-as FYI to DR. Salvadore Skiff

## 2021-02-16 ENCOUNTER — TELEPHONE (OUTPATIENT)
Dept: INTERNAL MEDICINE CLINIC | Facility: CLINIC | Age: 76
End: 2021-02-16

## 2021-02-16 RX ORDER — INSULIN DETEMIR 100 [IU]/ML
40 INJECTION, SOLUTION SUBCUTANEOUS DAILY
Qty: 15 ML | Refills: 3 | COMMUNITY
Start: 2021-02-16 | End: 2021-03-22

## 2021-02-16 NOTE — TELEPHONE ENCOUNTER
To Dr. Renae Bower to please advise, thanks! It appears these are follow up readings to go along with TE 1/13/21.

## 2021-02-16 NOTE — TELEPHONE ENCOUNTER
Patient is calling with Blood Sugar Readings    2/12  9:45 am fasting 182  2/12 5:45 pm after dinner 227    2/13 9:45 am fasting 177  2/13 6:15 pm after dinner 188    2/14 10:15 am fasting 172  2/14  5:00 pm after dinner 155    2/15 9:30 am fasting 184

## 2021-02-16 NOTE — TELEPHONE ENCOUNTER
To nursing,   increase Levemir dose to 40 units daily. Bring her home sugars to her office visit with me on 3/22/21.   Thanks

## 2021-03-03 ENCOUNTER — IMMUNIZATION (OUTPATIENT)
Dept: LAB | Age: 76
End: 2021-03-03
Attending: HOSPITALIST
Payer: MEDICARE

## 2021-03-03 DIAGNOSIS — Z23 NEED FOR VACCINATION: Primary | ICD-10-CM

## 2021-03-03 PROCEDURE — 0002A SARSCOV2 VAC 30MCG/0.3ML IM: CPT

## 2021-03-08 DIAGNOSIS — E11.9 TYPE 2 DIABETES MELLITUS WITHOUT COMPLICATION, WITHOUT LONG-TERM CURRENT USE OF INSULIN (HCC): ICD-10-CM

## 2021-03-08 DIAGNOSIS — E78.2 HYPERLIPIDEMIA, MIXED: ICD-10-CM

## 2021-03-08 DIAGNOSIS — I10 ESSENTIAL HYPERTENSION: ICD-10-CM

## 2021-03-08 RX ORDER — GLIMEPIRIDE 4 MG/1
TABLET ORAL
Qty: 180 TABLET | Refills: 3 | Status: SHIPPED | OUTPATIENT
Start: 2021-03-08

## 2021-03-09 ENCOUNTER — TELEPHONE (OUTPATIENT)
Dept: INTERNAL MEDICINE CLINIC | Facility: CLINIC | Age: 76
End: 2021-03-09

## 2021-03-09 RX ORDER — BLOOD-GLUCOSE METER
KIT MISCELLANEOUS
Qty: 100 STRIP | Refills: 3 | Status: SHIPPED | OUTPATIENT
Start: 2021-03-09 | End: 2021-03-22

## 2021-03-11 ENCOUNTER — LAB ENCOUNTER (OUTPATIENT)
Dept: LAB | Facility: HOSPITAL | Age: 76
End: 2021-03-11
Attending: INTERNAL MEDICINE
Payer: MEDICARE

## 2021-03-11 DIAGNOSIS — N18.30 STAGE 3 CHRONIC KIDNEY DISEASE, UNSPECIFIED WHETHER STAGE 3A OR 3B CKD (HCC): ICD-10-CM

## 2021-03-11 LAB
ALBUMIN SERPL-MCNC: 3.9 G/DL (ref 3.4–5)
ANION GAP SERPL CALC-SCNC: 4 MMOL/L (ref 0–18)
BILIRUB UR QL: NEGATIVE
BUN BLD-MCNC: 29 MG/DL (ref 7–18)
BUN/CREAT SERPL: 17 (ref 10–20)
CALCIUM BLD-MCNC: 9.3 MG/DL (ref 8.5–10.1)
CHLORIDE SERPL-SCNC: 109 MMOL/L (ref 98–112)
CO2 SERPL-SCNC: 26 MMOL/L (ref 21–32)
COLOR UR: YELLOW
CREAT BLD-MCNC: 1.71 MG/DL
GLUCOSE BLD-MCNC: 179 MG/DL (ref 70–99)
GLUCOSE UR-MCNC: NEGATIVE MG/DL
HYALINE CASTS #/AREA URNS AUTO: PRESENT /LPF
KETONES UR-MCNC: NEGATIVE MG/DL
NITRITE UR QL STRIP.AUTO: NEGATIVE
OSMOLALITY SERPL CALC.SUM OF ELEC: 298 MOSM/KG (ref 275–295)
PH UR: 5 [PH] (ref 5–8)
PHOSPHATE SERPL-MCNC: 4.4 MG/DL (ref 2.5–4.9)
POTASSIUM SERPL-SCNC: 5 MMOL/L (ref 3.5–5.1)
PROT UR-MCNC: 100 MG/DL
SODIUM SERPL-SCNC: 139 MMOL/L (ref 136–145)
SP GR UR STRIP: 1.02 (ref 1–1.03)
UROBILINOGEN UR STRIP-ACNC: <2

## 2021-03-11 PROCEDURE — 81001 URINALYSIS AUTO W/SCOPE: CPT

## 2021-03-11 PROCEDURE — 80069 RENAL FUNCTION PANEL: CPT

## 2021-03-11 PROCEDURE — 36415 COLL VENOUS BLD VENIPUNCTURE: CPT

## 2021-03-15 NOTE — TELEPHONE ENCOUNTER
Patient received email today from [facility] that prescription for strips was not received. Patient is asking that we send again to pharmacy.

## 2021-03-15 NOTE — TELEPHONE ENCOUNTER
I spoke with [facility] pharmacy. Script was sent last week. Her pharmacy received the script. They explained that she has to wait until tomorrow 3/16/21 per medicare coverage. I spoke with patient and she verbalized understanding.

## 2021-03-21 NOTE — PROGRESS NOTES
Stephanie Pham is a 66year old female who presents for     Check at 3 mo. HTN--home BPs 130s to 140s  over 70s. HRs 60s to 70s. Saw Dr Shaila Waldron 3/19/21 and pt her BP there was 110/62 with P 80. DM -Home sugars--AMs up to 150s to 212. . no low suga LOCALIZATION WIRE 1 SITE LEFT (CPT=19281)  1998   • NEPHRECTOMY Right 09/02/2020     robotic asissted R radical nephrectomy 9/2/20 Dr Joey Wen.    • XI ROBOT-ASSISTED LAPAROSCOPIC NEPHRECTOMY Right 9/2/2020    Performed by Carey Arias MD at [place] [id number] 3/22/21 visit  Extremities: no edema,    Neurologic: alert and oriented      ASSESSMENT AND PLAN:     Diabetes mellitus-A1c  6.2 by fingerstick at 3/22/21 visit, 6.4 on 9/8/20-- still high morning sugars up to 200 although A1c  6.2 indicates good overall c test 8/26/20–small fixed apical perfusion defect. LVEF 60%. D/w Dr [facility] further eval needed for result. LBBB -LBBB on EKG of 5/19/14 visit-stress echo per Dr. Moises Ormond 6/11/14- nl.   Carotid bruits--bilat.  Lif[employer] screening 10/3/17-carotids mild bilat Take 1 tablet (20 mg total) by mouth daily. 90 tablet 3   • simvastatin 5 MG Oral Tab Take 1 tablet (5 mg total) by mouth daily.  90 tablet 3   • Insulin Pen Needle (BD PEN NEEDLE MINI U/F) 31G X 5 MM Does not apply Misc USE DAILY WITH INSULIN 100 each 3

## 2021-03-22 ENCOUNTER — TELEPHONE (OUTPATIENT)
Dept: INTERNAL MEDICINE CLINIC | Facility: CLINIC | Age: 76
End: 2021-03-22

## 2021-03-22 ENCOUNTER — OFFICE VISIT (OUTPATIENT)
Dept: INTERNAL MEDICINE CLINIC | Facility: CLINIC | Age: 76
End: 2021-03-22
Payer: MEDICARE

## 2021-03-22 VITALS
TEMPERATURE: 99 F | HEIGHT: 62 IN | DIASTOLIC BLOOD PRESSURE: 60 MMHG | HEART RATE: 84 BPM | WEIGHT: 147 LBS | BODY MASS INDEX: 27.05 KG/M2 | SYSTOLIC BLOOD PRESSURE: 120 MMHG

## 2021-03-22 DIAGNOSIS — Z90.5 S/P NEPHRECTOMY: ICD-10-CM

## 2021-03-22 DIAGNOSIS — E78.2 HYPERLIPIDEMIA, MIXED: ICD-10-CM

## 2021-03-22 DIAGNOSIS — N18.4 CKD (CHRONIC KIDNEY DISEASE), STAGE IV (HCC): ICD-10-CM

## 2021-03-22 DIAGNOSIS — E11.9 TYPE 2 DIABETES MELLITUS WITHOUT COMPLICATION, WITHOUT LONG-TERM CURRENT USE OF INSULIN (HCC): Primary | ICD-10-CM

## 2021-03-22 DIAGNOSIS — I10 ESSENTIAL HYPERTENSION: ICD-10-CM

## 2021-03-22 LAB
CARTRIDGE LOT#: ABNORMAL NUMERIC
HEMOGLOBIN A1C: 6.2 % (ref 4.3–5.6)

## 2021-03-22 PROCEDURE — 36416 COLLJ CAPILLARY BLOOD SPEC: CPT | Performed by: INTERNAL MEDICINE

## 2021-03-22 PROCEDURE — 83036 HEMOGLOBIN GLYCOSYLATED A1C: CPT | Performed by: INTERNAL MEDICINE

## 2021-03-22 PROCEDURE — 99214 OFFICE O/P EST MOD 30 MIN: CPT | Performed by: INTERNAL MEDICINE

## 2021-03-22 RX ORDER — INSULIN ASPART 100 [IU]/ML
2 INJECTION, SOLUTION INTRAVENOUS; SUBCUTANEOUS
Qty: 1 CARTRIDGE | Refills: 11 | Status: SHIPPED | OUTPATIENT
Start: 2021-03-22 | End: 2021-03-25 | Stop reason: CLARIF

## 2021-03-22 RX ORDER — INSULIN DETEMIR 100 [IU]/ML
40 INJECTION, SOLUTION SUBCUTANEOUS DAILY
Qty: 15 ML | Refills: 3 | Status: SHIPPED | OUTPATIENT
Start: 2021-03-22 | End: 2021-08-16

## 2021-03-22 RX ORDER — BLOOD-GLUCOSE METER
KIT MISCELLANEOUS
Qty: 300 STRIP | Refills: 3 | Status: SHIPPED | OUTPATIENT
Start: 2021-03-22

## 2021-03-22 NOTE — PATIENT INSTRUCTIONS
Add new insulin-- Novolog (aspart)  inject 2 units 15 min to 30 min before eating your midday and evening meals. Test your sugars 3 times a day before meals. Notify us of your pre-meal sugars in 2 wks. Call if running low sugars.

## 2021-03-22 NOTE — TELEPHONE ENCOUNTER
[facility] for Novolog    Please advise - vial/cartridge or insulin pen     -  if pen send for 15 ml     In purple folder

## 2021-03-23 RX ORDER — PEN NEEDLE, DIABETIC 31 GX5/16"
NEEDLE, DISPOSABLE MISCELLANEOUS
Qty: 300 EACH | Refills: 3 | Status: SHIPPED | OUTPATIENT
Start: 2021-03-23

## 2021-03-23 NOTE — TELEPHONE ENCOUNTER
Spoke to pt -- with her low dose she will need to prime each new needle;   Pt acknowledged; [facility]  Pt requested new order for pen needles;   Rx sent to mail order

## 2021-03-24 NOTE — TELEPHONE ENCOUNTER
Pt called[facility] [place] still needs clarification for Novolog as below  Cannot open box, must dispense as box of 5  Please call pharmacy to advise  Tasked to RX

## 2021-03-25 RX ORDER — INSULIN ASPART 100 [IU]/ML
INJECTION, SOLUTION INTRAVENOUS; SUBCUTANEOUS
Qty: 15 ML | Refills: 0 | Status: SHIPPED | OUTPATIENT
Start: 2021-03-25 | End: 2021-04-09

## 2021-03-25 NOTE — TELEPHONE ENCOUNTER
Patient calling. Pharmacy is stating prescription for Novolog states two pens. Novolog comes in box of five, pharmacy can not open box to give only two pens.     Best call back number [phone]

## 2021-03-25 NOTE — TELEPHONE ENCOUNTER
Fixed Rx and resent  Requested Prescriptions     Signed Prescriptions Disp Refills   • Insulin Pen Needle (BD PEN NEEDLE MINI U/F) 31G X 5 MM Does not apply Misc 300 each 3     Sig: USE three times DAILY WITH INSULIN     Authorizing Provider: Fransisca López

## 2021-04-09 ENCOUNTER — TELEPHONE (OUTPATIENT)
Dept: INTERNAL MEDICINE CLINIC | Facility: CLINIC | Age: 76
End: 2021-04-09

## 2021-04-09 RX ORDER — INSULIN ASPART 100 [IU]/ML
INJECTION, SOLUTION INTRAVENOUS; SUBCUTANEOUS
Qty: 15 ML | Refills: 0 | COMMUNITY
Start: 2021-04-09 | End: 2021-04-27

## 2021-04-09 NOTE — TELEPHONE ENCOUNTER
To nursing, please tell patient I reviewed her home sugar record. Continue NovoLog 2 units before her midday meal.  Increase NovoLog 4 units before her evening meal.  Please let us know new blood sugar readings in 2-3 weeks. Thanks.     Note to self–  Trevor

## 2021-04-26 ENCOUNTER — TELEPHONE (OUTPATIENT)
Dept: INTERNAL MEDICINE CLINIC | Facility: CLINIC | Age: 76
End: 2021-04-26

## 2021-04-26 ENCOUNTER — TELEPHONE (OUTPATIENT)
Dept: HEMATOLOGY/ONCOLOGY | Facility: HOSPITAL | Age: 76
End: 2021-04-26

## 2021-04-26 ENCOUNTER — OFFICE VISIT (OUTPATIENT)
Dept: HEMATOLOGY/ONCOLOGY | Facility: HOSPITAL | Age: 76
End: 2021-04-26
Attending: INTERNAL MEDICINE
Payer: MEDICARE

## 2021-04-26 VITALS
OXYGEN SATURATION: 98 % | WEIGHT: 150 LBS | DIASTOLIC BLOOD PRESSURE: 62 MMHG | RESPIRATION RATE: 16 BRPM | HEIGHT: 62 IN | HEART RATE: 82 BPM | BODY MASS INDEX: 27.6 KG/M2 | SYSTOLIC BLOOD PRESSURE: 166 MMHG | TEMPERATURE: 97 F

## 2021-04-26 DIAGNOSIS — E87.5 SERUM POTASSIUM ELEVATED: Primary | ICD-10-CM

## 2021-04-26 DIAGNOSIS — Z90.5 S/P NEPHRECTOMY: ICD-10-CM

## 2021-04-26 DIAGNOSIS — C64.1 RENAL CELL CARCINOMA OF RIGHT KIDNEY (HCC): ICD-10-CM

## 2021-04-26 DIAGNOSIS — E11.9 TYPE 2 DIABETES MELLITUS WITHOUT COMPLICATION, WITHOUT LONG-TERM CURRENT USE OF INSULIN (HCC): ICD-10-CM

## 2021-04-26 DIAGNOSIS — I10 ESSENTIAL HYPERTENSION: ICD-10-CM

## 2021-04-26 DIAGNOSIS — N18.30 STAGE 3 CHRONIC KIDNEY DISEASE, UNSPECIFIED WHETHER STAGE 3A OR 3B CKD (HCC): ICD-10-CM

## 2021-04-26 DIAGNOSIS — C64.1 RENAL CELL CARCINOMA OF RIGHT KIDNEY (HCC): Primary | ICD-10-CM

## 2021-04-26 DIAGNOSIS — E78.2 HYPERLIPIDEMIA, MIXED: ICD-10-CM

## 2021-04-26 PROCEDURE — 99214 OFFICE O/P EST MOD 30 MIN: CPT | Performed by: INTERNAL MEDICINE

## 2021-04-26 PROCEDURE — 85025 COMPLETE CBC W/AUTO DIFF WBC: CPT

## 2021-04-26 PROCEDURE — 36415 COLL VENOUS BLD VENIPUNCTURE: CPT

## 2021-04-26 PROCEDURE — 80053 COMPREHEN METABOLIC PANEL: CPT

## 2021-04-26 RX ORDER — ENALAPRIL MALEATE 20 MG/1
10 TABLET ORAL DAILY
Qty: 90 TABLET | Refills: 3 | COMMUNITY
Start: 2021-04-26 | End: 2021-10-26

## 2021-04-26 NOTE — PROGRESS NOTES
[facility] Progress Note    Patient Name: Germán Lopez   YOB: 1945   Medical Record Number: [id number]   Attending Physician: Melony Munoz M.D.      Chief Complaint:  RCC    Oncology History:  66year old referred by Kavitha Louis, from consult note    Current Medications:    Current Outpatient Medications:   •  Insulin Aspart Pen (NOVOLOG FLEXPEN) 100 UNIT/ML Subcutaneous Solution Pen-injector, Inject 2 units before the midday meal and 4 units before the evening meal., Disp: 15 mL, oriented x 3, not in acute distress. HEENT: EOMs intact. Oropharynx is clear. Neck: No palpable lymphadenopathy. Neck is supple.   Chest: Symmetric expansion, nonlabored breathing  Cardiovascular: Regular with palpable distal pulses   Abdomen: Soft, non

## 2021-04-26 NOTE — TELEPHONE ENCOUNTER
I spoke with Claude Couch. I let her know that her potassium level was high. She is not taking any supplements with potassium. She does eat potatoes every day and also eats a lot of dark leafy green vegetables.   I asked her to please cut back on the potassium

## 2021-04-27 ENCOUNTER — TELEPHONE (OUTPATIENT)
Dept: INTERNAL MEDICINE CLINIC | Facility: CLINIC | Age: 76
End: 2021-04-27

## 2021-04-27 RX ORDER — INSULIN ASPART 100 [IU]/ML
INJECTION, SOLUTION INTRAVENOUS; SUBCUTANEOUS
Qty: 15 ML | Refills: 0 | COMMUNITY
Start: 2021-04-27 | End: 2021-09-02

## 2021-04-27 NOTE — TELEPHONE ENCOUNTER
To nursing, please tell patient I reviewed the blood sugars she dropped off. Sugars are overall improved. However, would like sugars a little lower.     New instruction–increase NovoLog 3 units before her midday meal (currently 2 units) and increase NovoL

## 2021-04-27 NOTE — TELEPHONE ENCOUNTER
Message received in my in basket in the results section from Dr. Modesto Worthy:  MD Josselyn Goode, KALIA; Isaiah Glasser, MD Iven Barthel: can you call patient and tell her potassium is on the higher side.  Is she on replacement?       Dr Leslie Ott, can you s

## 2021-04-27 NOTE — TELEPHONE ENCOUNTER
To nursing, please tell patient Dr. Shaunna Harman asked me to review her potassium level today-- it is a little elevated.  (K5.2). It may be a little elevated due to her enalapril. Please reduce the enalapril from 20 mg daily down to new dose of 10 mg daily.

## 2021-05-12 ENCOUNTER — LAB ENCOUNTER (OUTPATIENT)
Dept: LAB | Age: 76
End: 2021-05-12
Attending: INTERNAL MEDICINE
Payer: MEDICARE

## 2021-05-12 DIAGNOSIS — C64.1 RENAL CELL CARCINOMA OF RIGHT KIDNEY (HCC): ICD-10-CM

## 2021-05-12 DIAGNOSIS — E87.5 SERUM POTASSIUM ELEVATED: ICD-10-CM

## 2021-05-12 PROCEDURE — 80053 COMPREHEN METABOLIC PANEL: CPT

## 2021-05-12 PROCEDURE — 36415 COLL VENOUS BLD VENIPUNCTURE: CPT

## 2021-05-12 NOTE — TELEPHONE ENCOUNTER
Patient stopped in today and dropped off her blood pressure and sugar readings. Patient also informed  that she had completed her blood work recently as well.      Blood Pressure/Pulse Readings:  3/26 10am 127/68-78  4/2 9am 142/62-71  4/10 9:30am

## 2021-05-12 NOTE — TELEPHONE ENCOUNTER
To nursing, please tell patient her home sugars at home BPs look good overall. Continue present medications. The lab done today (CMP ordered by Dr. John Chun) shows potassium level is now normal.  See me 6/22/21 as planned. Thanks.     Note to self–  5/12/2

## 2021-06-16 ENCOUNTER — TELEPHONE (OUTPATIENT)
Dept: INTERNAL MEDICINE CLINIC | Facility: CLINIC | Age: 76
End: 2021-06-16

## 2021-06-16 NOTE — TELEPHONE ENCOUNTER
Please call to advise if patient should have labs done before her appt w/Dr Avery Doing on 6/22    [phone]

## 2021-06-21 NOTE — PROGRESS NOTES
Talia Barcenas is a 66year old female who presents for     Check at 3 mo. HTN--home BPs 130s to 140s  over 60-70s. HRs 60s to 70s. K5.2 on lab 4/26/21 per Dr. Grace De La Cruz. Patient was adv from here to decr enalapril 10 mg daily. F/u K 4.6 on 5/12/21. Relation Age of Onset   • Stroke Father          age [de-identified]   • Other (unknown cause of death) Mother          age 80   • Hypertension Brother    • Other (1 brother) Other         living and well   • Other (1 son, 1 daughter) Other    • Breast Cancer N Endocrinologist.  Emelyn Vick diab ed refresher. Diet, ex--wt loss. ophtho Dr Vadim Pugh. Hypertension - enalapril 10 mg daily, norvasc 10 mg daily, hydralazine 10 mg bid. BPs ok. Off hctz 25 mg since in hosp 9/20 due to renal insuff.     S/p R nephrectomy for 28 (stable). Ret in 3 mo with A1c. . Call sooner prn.      Patient expresses understanding of above issues and plan.   Spent 30 min reviewing chart, history, exam, reviewing assessment/ plan, orders and completing documentation.    - HEMOGLOBIN A1C; Futur

## 2021-06-22 ENCOUNTER — OFFICE VISIT (OUTPATIENT)
Dept: INTERNAL MEDICINE CLINIC | Facility: CLINIC | Age: 76
End: 2021-06-22
Payer: MEDICARE

## 2021-06-22 VITALS
HEART RATE: 76 BPM | BODY MASS INDEX: 28.19 KG/M2 | SYSTOLIC BLOOD PRESSURE: 120 MMHG | WEIGHT: 153.19 LBS | HEIGHT: 62 IN | OXYGEN SATURATION: 99 % | DIASTOLIC BLOOD PRESSURE: 60 MMHG | TEMPERATURE: 98 F

## 2021-06-22 DIAGNOSIS — Z79.4 TYPE 2 DIABETES MELLITUS WITHOUT COMPLICATION, WITH LONG-TERM CURRENT USE OF INSULIN (HCC): Primary | ICD-10-CM

## 2021-06-22 DIAGNOSIS — Z12.31 VISIT FOR SCREENING MAMMOGRAM: ICD-10-CM

## 2021-06-22 DIAGNOSIS — I10 ESSENTIAL HYPERTENSION: ICD-10-CM

## 2021-06-22 DIAGNOSIS — E11.9 TYPE 2 DIABETES MELLITUS WITHOUT COMPLICATION, WITH LONG-TERM CURRENT USE OF INSULIN (HCC): Primary | ICD-10-CM

## 2021-06-22 DIAGNOSIS — Z90.5 S/P NEPHRECTOMY: ICD-10-CM

## 2021-06-22 DIAGNOSIS — N18.4 CKD (CHRONIC KIDNEY DISEASE), STAGE IV (HCC): ICD-10-CM

## 2021-06-22 PROCEDURE — 99214 OFFICE O/P EST MOD 30 MIN: CPT | Performed by: INTERNAL MEDICINE

## 2021-07-27 ENCOUNTER — HOSPITAL ENCOUNTER (OUTPATIENT)
Dept: MRI IMAGING | Facility: HOSPITAL | Age: 76
Discharge: HOME | End: 2021-07-27
Attending: UROLOGY
Payer: MEDICARE

## 2021-07-27 ENCOUNTER — LAB ENCOUNTER (OUTPATIENT)
Dept: LAB | Facility: HOSPITAL | Age: 76
End: 2021-07-27
Attending: UROLOGY
Payer: MEDICARE

## 2021-07-27 ENCOUNTER — HOSPITAL ENCOUNTER (OUTPATIENT)
Dept: GENERAL RADIOLOGY | Facility: HOSPITAL | Age: 76
Discharge: HOME | End: 2021-07-27
Attending: UROLOGY
Payer: MEDICARE

## 2021-07-27 DIAGNOSIS — N28.89 RENAL MASS: ICD-10-CM

## 2021-07-27 DIAGNOSIS — N28.89 URETERAL FISTULA: Primary | ICD-10-CM

## 2021-07-27 LAB
ALBUMIN SERPL-MCNC: 4 G/DL (ref 3.4–5)
ALBUMIN/GLOB SERPL: 1.2 {RATIO} (ref 1–2)
ALP LIVER SERPL-CCNC: 98 U/L
ALT SERPL-CCNC: 21 U/L
ANION GAP SERPL CALC-SCNC: 8 MMOL/L (ref 0–18)
AST SERPL-CCNC: 10 U/L (ref 15–37)
BILIRUB SERPL-MCNC: 0.4 MG/DL (ref 0.1–2)
BUN BLD-MCNC: 36 MG/DL (ref 7–18)
BUN/CREAT SERPL: 22.1 (ref 10–20)
CALCIUM BLD-MCNC: 9.5 MG/DL (ref 8.5–10.1)
CHLORIDE SERPL-SCNC: 105 MMOL/L (ref 98–112)
CO2 SERPL-SCNC: 23 MMOL/L (ref 21–32)
CREAT BLD-MCNC: 1.63 MG/DL
CREAT BLD-MCNC: 1.8 MG/DL
GLOBULIN PLAS-MCNC: 3.4 G/DL (ref 2.8–4.4)
GLUCOSE BLD-MCNC: 202 MG/DL (ref 70–99)
M PROTEIN MFR SERPL ELPH: 7.4 G/DL (ref 6.4–8.2)
OSMOLALITY SERPL CALC.SUM OF ELEC: 296 MOSM/KG (ref 275–295)
PATIENT FASTING Y/N/NP: YES
POTASSIUM SERPL-SCNC: 4.4 MMOL/L (ref 3.5–5.1)
SODIUM SERPL-SCNC: 136 MMOL/L (ref 136–145)

## 2021-07-27 PROCEDURE — 82565 ASSAY OF CREATININE: CPT

## 2021-07-27 PROCEDURE — 74181 MRI ABDOMEN W/O CONTRAST: CPT | Performed by: UROLOGY

## 2021-07-27 PROCEDURE — 80053 COMPREHEN METABOLIC PANEL: CPT

## 2021-07-27 PROCEDURE — 36415 COLL VENOUS BLD VENIPUNCTURE: CPT

## 2021-07-27 PROCEDURE — 71046 X-RAY EXAM CHEST 2 VIEWS: CPT | Performed by: UROLOGY

## 2021-08-09 ENCOUNTER — HOSPITAL ENCOUNTER (OUTPATIENT)
Dept: MAMMOGRAPHY | Facility: HOSPITAL | Age: 76
Discharge: HOME | End: 2021-08-09
Attending: INTERNAL MEDICINE
Payer: MEDICARE

## 2021-08-09 DIAGNOSIS — Z12.31 VISIT FOR SCREENING MAMMOGRAM: ICD-10-CM

## 2021-08-09 PROCEDURE — 77067 SCR MAMMO BI INCL CAD: CPT | Performed by: INTERNAL MEDICINE

## 2021-08-09 PROCEDURE — 77063 BREAST TOMOSYNTHESIS BI: CPT | Performed by: INTERNAL MEDICINE

## 2021-08-11 ENCOUNTER — TELEPHONE (OUTPATIENT)
Dept: INTERNAL MEDICINE CLINIC | Facility: CLINIC | Age: 76
End: 2021-08-11

## 2021-08-11 NOTE — TELEPHONE ENCOUNTER
To nursing, please tell patient the mammography department will be calling her back for additional mammogram views due to some possible skin artifacts (possibly lotion) versus calcification in her breasts.   It is common for them to call patients back for a

## 2021-08-16 RX ORDER — INSULIN DETEMIR 100 [IU]/ML
INJECTION, SOLUTION SUBCUTANEOUS
Qty: 15 ML | Refills: 5 | Status: SHIPPED | OUTPATIENT
Start: 2021-08-16 | End: 2021-09-22

## 2021-08-24 ENCOUNTER — TELEPHONE (OUTPATIENT)
Dept: INTERNAL MEDICINE CLINIC | Facility: CLINIC | Age: 76
End: 2021-08-24

## 2021-08-24 ENCOUNTER — HOSPITAL ENCOUNTER (OUTPATIENT)
Dept: MAMMOGRAPHY | Facility: HOSPITAL | Age: 76
Discharge: HOME | End: 2021-08-24
Attending: INTERNAL MEDICINE
Payer: MEDICARE

## 2021-08-24 DIAGNOSIS — R92.8 ABNORMAL MAMMOGRAM OF BOTH BREASTS: Primary | ICD-10-CM

## 2021-08-24 DIAGNOSIS — R92.8 ABNORMAL MAMMOGRAM: ICD-10-CM

## 2021-08-24 PROCEDURE — 77066 DX MAMMO INCL CAD BI: CPT | Performed by: INTERNAL MEDICINE

## 2021-08-24 PROCEDURE — 77062 BREAST TOMOSYNTHESIS BI: CPT | Performed by: INTERNAL MEDICINE

## 2021-08-24 NOTE — TELEPHONE ENCOUNTER
To nursing, please tell patient mammogram densities are improved from the prior mammogram of 8/9/21. The radiologist recommends repeat mammogram in 6 months. Order has been entered in the system. Thanks.       Note to self–  8/24/21–Mammo loretta diag addl

## 2021-09-01 RX ORDER — HYDRALAZINE HYDROCHLORIDE 10 MG/1
TABLET, FILM COATED ORAL
Qty: 180 TABLET | Refills: 3 | Status: SHIPPED | OUTPATIENT
Start: 2021-09-01 | End: 2021-10-26 | Stop reason: DRUGHIGH

## 2021-09-02 ENCOUNTER — TELEPHONE (OUTPATIENT)
Dept: INTERNAL MEDICINE CLINIC | Facility: CLINIC | Age: 76
End: 2021-09-02

## 2021-09-02 RX ORDER — INSULIN ASPART 100 [IU]/ML
INJECTION, SOLUTION INTRAVENOUS; SUBCUTANEOUS
Qty: 9 ML | Refills: 1 | Status: SHIPPED | OUTPATIENT
Start: 2021-09-02 | End: 2021-09-09

## 2021-09-03 NOTE — TELEPHONE ENCOUNTER
[facility] rec'd [facility] [place]  Regarding:  Novolog    \"alternative requested:  Send for 15ML due to inability to break boxd\"    Fax placed in purple folder    Tasked to [employer]

## 2021-09-09 RX ORDER — INSULIN ASPART 100 [IU]/ML
INJECTION, SOLUTION INTRAVENOUS; SUBCUTANEOUS
Qty: 15 ML | Refills: 1 | Status: SHIPPED | OUTPATIENT
Start: 2021-09-09 | End: 2021-09-22

## 2021-09-20 ENCOUNTER — LAB ENCOUNTER (OUTPATIENT)
Dept: LAB | Age: 76
End: 2021-09-20
Attending: INTERNAL MEDICINE
Payer: MEDICARE

## 2021-09-20 DIAGNOSIS — Z79.4 TYPE 2 DIABETES MELLITUS WITHOUT COMPLICATION, WITH LONG-TERM CURRENT USE OF INSULIN (HCC): ICD-10-CM

## 2021-09-20 DIAGNOSIS — E11.9 TYPE 2 DIABETES MELLITUS WITHOUT COMPLICATION, WITH LONG-TERM CURRENT USE OF INSULIN (HCC): ICD-10-CM

## 2021-09-20 LAB
EST. AVERAGE GLUCOSE BLD GHB EST-MCNC: 160 MG/DL (ref 68–126)
HBA1C MFR BLD HPLC: 7.2 % (ref ?–5.7)

## 2021-09-20 PROCEDURE — 83036 HEMOGLOBIN GLYCOSYLATED A1C: CPT

## 2021-09-20 PROCEDURE — 36415 COLL VENOUS BLD VENIPUNCTURE: CPT

## 2021-09-21 NOTE — PROGRESS NOTES
Aguila Evans is a 66year old female who presents for     Check at 3 mo. HTN--home BPs 120s/50s to 130s/70s. DM -Home sugars 100s.  (recent range 125-200)-highest in am.  Continues on glimepiride 8 mg q d and Levemir 40 units daily, novolog 0, age [de-identified]   • Other (unknown cause of death) Mother          age 80   • Hypertension Brother    • Other (1 brother) Other         living and well   • Other (1 son, 1 daughter) Other    • Breast Cancer Neg       Social History:   Social History    Tobacco Cesar Labor. Hypertension - enalapril 10 mg daily, norvasc 10 mg daily, hydralazine 10 mg bid. Home BPs ok. Off hctz 25 mg since in hosp 9/20 due to renal insuff. S/p R nephrectomy for renal cell carcinoma -9/2/20 Dr Ramirez Pod cm -margins free. Aime--renal fcn UA--creat 1.71, GFR 28, UA-100 protein. 4/26/21–CBC CMP-Dr. LANDRY–M3.4 creat 1.67 GFR 29.  5/12/21–CMP–K4.6, creat 1.72, GFR 28 (stable). 9/20/21–A1c 7.2. Ret in 3 mo with A1c cmp lipid tsh. . Call sooner prn.      Patient expresses Haynes Frankel

## 2021-09-22 ENCOUNTER — OFFICE VISIT (OUTPATIENT)
Dept: INTERNAL MEDICINE CLINIC | Facility: CLINIC | Age: 76
End: 2021-09-22
Payer: MEDICARE

## 2021-09-22 VITALS
SYSTOLIC BLOOD PRESSURE: 154 MMHG | HEIGHT: 62 IN | TEMPERATURE: 98 F | HEART RATE: 64 BPM | DIASTOLIC BLOOD PRESSURE: 74 MMHG | BODY MASS INDEX: 28.34 KG/M2 | WEIGHT: 154 LBS

## 2021-09-22 DIAGNOSIS — Z90.5 S/P NEPHRECTOMY: ICD-10-CM

## 2021-09-22 DIAGNOSIS — Z79.4 TYPE 2 DIABETES MELLITUS WITHOUT COMPLICATION, WITH LONG-TERM CURRENT USE OF INSULIN (HCC): ICD-10-CM

## 2021-09-22 DIAGNOSIS — I10 ESSENTIAL HYPERTENSION: Primary | ICD-10-CM

## 2021-09-22 DIAGNOSIS — E11.9 TYPE 2 DIABETES MELLITUS WITHOUT COMPLICATION, WITH LONG-TERM CURRENT USE OF INSULIN (HCC): ICD-10-CM

## 2021-09-22 DIAGNOSIS — N18.4 CKD (CHRONIC KIDNEY DISEASE), STAGE IV (HCC): ICD-10-CM

## 2021-09-22 PROCEDURE — 90662 IIV NO PRSV INCREASED AG IM: CPT | Performed by: INTERNAL MEDICINE

## 2021-09-22 PROCEDURE — 99214 OFFICE O/P EST MOD 30 MIN: CPT | Performed by: INTERNAL MEDICINE

## 2021-09-22 PROCEDURE — G0008 ADMIN INFLUENZA VIRUS VAC: HCPCS | Performed by: INTERNAL MEDICINE

## 2021-09-22 RX ORDER — INSULIN ASPART 100 [IU]/ML
INJECTION, SOLUTION INTRAVENOUS; SUBCUTANEOUS
Qty: 15 ML | Refills: 1 | COMMUNITY
Start: 2021-09-22 | End: 2021-12-22

## 2021-09-22 RX ORDER — INSULIN DETEMIR 100 [IU]/ML
40 INJECTION, SOLUTION SUBCUTANEOUS DAILY
COMMUNITY
Start: 2021-09-22 | End: 2021-10-21

## 2021-09-22 NOTE — PATIENT INSTRUCTIONS
Mammogram follow up in April 2022 (6 month follow up). Increase the pre -dinner dose of novolog aspart 6 units daily. Fasting lab in 3 months before next visit.

## 2021-10-21 RX ORDER — INSULIN DETEMIR 100 [IU]/ML
40 INJECTION, SOLUTION SUBCUTANEOUS DAILY
Qty: 15 ML | Refills: 3 | Status: SHIPPED | OUTPATIENT
Start: 2021-10-21

## 2021-10-26 ENCOUNTER — TELEPHONE (OUTPATIENT)
Dept: INTERNAL MEDICINE CLINIC | Facility: CLINIC | Age: 76
End: 2021-10-26

## 2021-10-26 ENCOUNTER — NURSE ONLY (OUTPATIENT)
Dept: HEMATOLOGY/ONCOLOGY | Facility: HOSPITAL | Age: 76
End: 2021-10-26
Attending: INTERNAL MEDICINE
Payer: MEDICARE

## 2021-10-26 VITALS
HEIGHT: 62 IN | OXYGEN SATURATION: 98 % | TEMPERATURE: 98 F | SYSTOLIC BLOOD PRESSURE: 168 MMHG | HEART RATE: 76 BPM | DIASTOLIC BLOOD PRESSURE: 54 MMHG | WEIGHT: 153.38 LBS | RESPIRATION RATE: 16 BRPM | BODY MASS INDEX: 28.22 KG/M2

## 2021-10-26 DIAGNOSIS — E87.5 SERUM POTASSIUM ELEVATED: Primary | ICD-10-CM

## 2021-10-26 DIAGNOSIS — C64.1 RENAL CELL CARCINOMA OF RIGHT KIDNEY (HCC): ICD-10-CM

## 2021-10-26 DIAGNOSIS — C64.1 RENAL CELL CARCINOMA OF RIGHT KIDNEY (HCC): Primary | ICD-10-CM

## 2021-10-26 PROCEDURE — 80053 COMPREHEN METABOLIC PANEL: CPT

## 2021-10-26 PROCEDURE — 85025 COMPLETE CBC W/AUTO DIFF WBC: CPT

## 2021-10-26 PROCEDURE — 36415 COLL VENOUS BLD VENIPUNCTURE: CPT

## 2021-10-26 PROCEDURE — 99214 OFFICE O/P EST MOD 30 MIN: CPT | Performed by: INTERNAL MEDICINE

## 2021-10-26 RX ORDER — ENALAPRIL MALEATE 5 MG/1
5 TABLET ORAL DAILY
Qty: 90 TABLET | Refills: 3 | Status: SHIPPED | OUTPATIENT
Start: 2021-10-26 | End: 2021-11-17

## 2021-10-26 RX ORDER — HYDRALAZINE HYDROCHLORIDE 25 MG/1
25 TABLET, FILM COATED ORAL 2 TIMES DAILY
Qty: 180 TABLET | Refills: 3 | Status: SHIPPED | OUTPATIENT
Start: 2021-10-26 | End: 2021-12-22 | Stop reason: DRUGHIGH

## 2021-10-26 NOTE — TELEPHONE ENCOUNTER
To nursing,   please tell patient Dr. Shy Najera sent me a message her potassium level is a little elevated again. I recommend she reduce enalapril 5 mg daily. At the same time increase hydralazine 25 mg twice daily.   New Rxs for these dose changes have been s

## 2021-10-26 NOTE — PROGRESS NOTES
[facility] Progress Note    Patient Name: Cynthia Alfredo   YOB: 1945   Medical Record Number: [id number]   Attending Physician: Lillian Shah M.D.      Chief Complaint:  RCC    Oncology History:  66year old referred by Tylor Jha note    Current Medications:    Current Outpatient Medications:   •  LEVEMIR FLEXTOUCH 100 UNIT/ML Subcutaneous Solution Pen-injector, INJECT 40 UNITS INTO THE SKIN DAILY. , Disp: 15 mL, Rfl: 3  •  Insulin Aspart Pen (NOVOLOG FLEXPEN) 100 UNIT/ML Subcutaneo Neck: No palpable lymphadenopathy. Neck is supple. Chest: Symmetric expansion, nonlabored breathing  Abdomen: Soft, non tender.  ND  Extremities: No edema, cyanosis, or bruising  Neurological: motor strength grossly intact, MA4E  Psych: appropriate mood

## 2021-10-26 NOTE — TELEPHONE ENCOUNTER
Ne Flores, she was splitting an enalapril 20 mg pill in half for 10 mg daily. New dose is now down to 5 mg daily. At least that is what I had in my notes. Thanks!  Anita Torrez

## 2021-10-26 NOTE — TELEPHONE ENCOUNTER
Spoke with patient. Confirmed previous enalapril dose--patient has been taking a half of a 20 mg tablet (10 mg total) daily. Relayed Dr. Aleena Alfredo' message in its entirety to patient.  Reviewed elevated potassium and changes in BP medications--reviewed meds

## 2021-10-26 NOTE — TELEPHONE ENCOUNTER
----- Message from Booker Camejo MD sent at 10/26/2021 11:28 AM CDT -----  Hi Dr Kishor Vance  I saw this patient for RCC follow up, her potassium is on the higher side, I instructed her to start a low K diet but probably need to re-address the Enalapril, altho

## 2021-11-01 ENCOUNTER — OFFICE VISIT (OUTPATIENT)
Dept: INTERNAL MEDICINE CLINIC | Facility: CLINIC | Age: 76
End: 2021-11-01
Payer: MEDICARE

## 2021-11-01 ENCOUNTER — TELEPHONE (OUTPATIENT)
Dept: INTERNAL MEDICINE CLINIC | Facility: CLINIC | Age: 76
End: 2021-11-01

## 2021-11-01 VITALS
HEIGHT: 62 IN | HEART RATE: 97 BPM | WEIGHT: 154 LBS | DIASTOLIC BLOOD PRESSURE: 60 MMHG | OXYGEN SATURATION: 99 % | SYSTOLIC BLOOD PRESSURE: 146 MMHG | BODY MASS INDEX: 28.34 KG/M2 | TEMPERATURE: 98 F

## 2021-11-01 DIAGNOSIS — M10.9 ACUTE GOUT INVOLVING TOE OF RIGHT FOOT, UNSPECIFIED CAUSE: Primary | ICD-10-CM

## 2021-11-01 DIAGNOSIS — M25.561 ACUTE PAIN OF RIGHT KNEE: ICD-10-CM

## 2021-11-01 PROCEDURE — 99213 OFFICE O/P EST LOW 20 MIN: CPT | Performed by: INTERNAL MEDICINE

## 2021-11-01 RX ORDER — METHYLPREDNISOLONE 4 MG/1
TABLET ORAL
Qty: 1 EACH | Refills: 0 | Status: SHIPPED | OUTPATIENT
Start: 2021-11-01 | End: 2021-12-22 | Stop reason: ALTCHOICE

## 2021-11-01 NOTE — TELEPHONE ENCOUNTER
To nursing,   Sx sound a little atypical for gout as it usually doesn't cause pain from toe to the knee. Ask pt to see me at 1 pm today. Thanks.

## 2021-11-01 NOTE — TELEPHONE ENCOUNTER
Please call pt  She is experiencing a gout flair up in toe, ball of foot, ankle up to knee is hurting, not red  Can medication be called into pharmacy?   [facility] [place] [facility]  Tasked to nursing

## 2021-11-01 NOTE — TELEPHONE ENCOUNTER
To Dr. Michelle Wagner to please advise-----    Spoke to pt, thinks she is having a gout flare up. Her pain started on Saturday and was mild. Pain has now worsened. Pain in right great toe, ball of foot, and her ankle up to her knee.  Pain is currently 5/10, but

## 2021-11-01 NOTE — TELEPHONE ENCOUNTER
Spoke to pt and relayed MD message and instructions. Pt verbalized understanding and agreeable to come in at 1PM today. Added to schedule.

## 2021-11-01 NOTE — PROGRESS NOTES
Jef Parr is a 66year old female who presents for     Pain R great toe and R knee pain  Onset 2 days ago Saturday 10/30/21, pain in R great toe similar to gout in 9/2020. Took prednisone then (required 2 courses then).      Pain in R knee since ye • Breast Cancer Neg       Social History:   Social History    Tobacco Use      Smoking status: Former Smoker        Packs/day: 1.00        Years: 20.00        Pack years: 21        Quit date: 1993        Years since quittin.1      Smokeless to 7.2 19/20/21–was 6.2 by fingerstick at 3/22/21 visit. Some lability to sugars. Glimepiride 8 mg q am, levimir 40 u/d, NovoLog 0, 3, and change pre dinner from 5 units to 6 units (9/22/21 visit). (eats light brkft , lunch--biggest meal at dinner).    Met (Order entered 8/24/21)  Dexa-3/5/18-nl. Pap neg 4/13 here.    colonoscopy 3/9/18-Dr Martínez-diverticulosis, 3 mark polyps, lipoma hepatic flexture. Next 3 yrs--Dr Perkins Done name given at 6/2/21 vsiit.   Vacc: zstvx 9/13, shingrix disc 7/23/18, DT 8/ by mouth daily. 90 tablet 3   • aspirin 81 MG Oral Tab EC Take 1 tablet (81 mg total) by mouth daily. 0   • LUMIGAN 0.01 % Ophthalmic Solution Place 1 drop into both eyes daily.            Judie Persaud MD  11/1/2021

## 2021-11-17 ENCOUNTER — LAB ENCOUNTER (OUTPATIENT)
Dept: LAB | Age: 76
End: 2021-11-17
Attending: INTERNAL MEDICINE
Payer: MEDICARE

## 2021-11-17 ENCOUNTER — TELEPHONE (OUTPATIENT)
Dept: INTERNAL MEDICINE CLINIC | Facility: CLINIC | Age: 76
End: 2021-11-17

## 2021-11-17 DIAGNOSIS — E87.5 SERUM POTASSIUM ELEVATED: ICD-10-CM

## 2021-11-17 PROCEDURE — 84132 ASSAY OF SERUM POTASSIUM: CPT

## 2021-11-17 PROCEDURE — 36415 COLL VENOUS BLD VENIPUNCTURE: CPT

## 2021-11-18 ENCOUNTER — HOSPITAL ENCOUNTER (OUTPATIENT)
Dept: GENERAL RADIOLOGY | Facility: HOSPITAL | Age: 76
Discharge: HOME | End: 2021-11-18
Attending: ORTHOPAEDIC SURGERY
Payer: MEDICARE

## 2021-11-18 ENCOUNTER — OFFICE VISIT (OUTPATIENT)
Dept: ORTHOPEDICS CLINIC | Facility: CLINIC | Age: 76
End: 2021-11-18
Payer: MEDICARE

## 2021-11-18 VITALS
WEIGHT: 154 LBS | HEART RATE: 98 BPM | HEIGHT: 62 IN | BODY MASS INDEX: 28.34 KG/M2 | DIASTOLIC BLOOD PRESSURE: 73 MMHG | SYSTOLIC BLOOD PRESSURE: 155 MMHG

## 2021-11-18 DIAGNOSIS — R52 PAIN: ICD-10-CM

## 2021-11-18 DIAGNOSIS — M17.11 PRIMARY OSTEOARTHRITIS OF RIGHT KNEE: Primary | ICD-10-CM

## 2021-11-18 PROCEDURE — 99204 OFFICE O/P NEW MOD 45 MIN: CPT | Performed by: ORTHOPAEDIC SURGERY

## 2021-11-18 PROCEDURE — 20610 DRAIN/INJ JOINT/BURSA W/O US: CPT | Performed by: ORTHOPAEDIC SURGERY

## 2021-11-18 PROCEDURE — 73564 X-RAY EXAM KNEE 4 OR MORE: CPT | Performed by: ORTHOPAEDIC SURGERY

## 2021-11-18 RX ORDER — TRIAMCINOLONE ACETONIDE 40 MG/ML
40 INJECTION, SUSPENSION INTRA-ARTICULAR; INTRAMUSCULAR ONCE
Status: COMPLETED | OUTPATIENT
Start: 2021-11-18 | End: 2021-11-18

## 2021-11-18 NOTE — ADDENDUM NOTE
Addended by: Ya Lee on: 11/18/2021 04:20 PM     Modules accepted: Orders, Level of Service, SmartSet

## 2021-11-18 NOTE — PROGRESS NOTES
Per verbal order from Dr. Wilkerson Avis, draw up 3ml of 0.5% Marcaine, 2ml of 1% lidocaine and 1ml of Kenalog 40 for cortisone injection to the right knee. Patient provided education handout for cortisone injection.    Patient left office without obtaining

## 2021-11-18 NOTE — PROGRESS NOTES
NURSING INTAKE COMMENTS: Patient presents with:  Consult: 66year old female is here today c/o R knee pain. Patient denies any recent trauma/injury related to today's pain. Pain knee x1 month after having gout.  Patient states she's unable to straighten her POLYPECTOMY 10371;  Surgeon: Chsae Velázquez MD;  Location: [address] [place] [address]   • TODD LOCALIZATION WIRE 1 SITE LEFT (XKR=67502)  1998   • NEPHRECTOMY Right 09/02/2020     robotic asissted R radical nephrectomy 9/2/20 Dr Mary Jo Lala.      Current Outpati History      Not on file    Tobacco Use      Smoking status: Former Smoker        Packs/day: 1.00        Years: 20.00        Pack years: 21        Quit date: 1993        Years since quittin.2      Smokeless tobacco: Never Used    Vaping Use The knee is stable varus valgus stress at 30 degrees and full extension. Rotation which is mild discomfort. No pain with passive range of motion of the hip. Neurological: Light touch and pinprick sensation intact throughout the lower extremities.   Ankle

## 2021-11-19 ENCOUNTER — TELEPHONE (OUTPATIENT)
Dept: ORTHOPEDICS CLINIC | Facility: CLINIC | Age: 76
End: 2021-11-19

## 2021-11-22 DIAGNOSIS — E78.2 HYPERLIPIDEMIA, MIXED: ICD-10-CM

## 2021-11-22 DIAGNOSIS — E11.9 TYPE 2 DIABETES MELLITUS WITHOUT COMPLICATION, WITHOUT LONG-TERM CURRENT USE OF INSULIN (HCC): ICD-10-CM

## 2021-11-22 DIAGNOSIS — I10 ESSENTIAL HYPERTENSION: ICD-10-CM

## 2021-11-22 RX ORDER — AMLODIPINE BESYLATE 10 MG/1
TABLET ORAL
Qty: 90 TABLET | Refills: 3 | Status: SHIPPED | OUTPATIENT
Start: 2021-11-22

## 2021-11-24 ENCOUNTER — PATIENT OUTREACH (OUTPATIENT)
Dept: CASE MANAGEMENT | Age: 76
End: 2021-11-24

## 2021-11-24 NOTE — PROGRESS NOTES
Spoke to patient, states she already has a  with Dr. Eulogio Martinez office who calls her once a month. Program information sent to patient in case she would ever like to change to [facility].      Patient identified with a potential need for Chronic Care

## 2021-12-17 ENCOUNTER — LAB ENCOUNTER (OUTPATIENT)
Dept: LAB | Age: 76
End: 2021-12-17
Attending: INTERNAL MEDICINE
Payer: MEDICARE

## 2021-12-17 DIAGNOSIS — Z79.4 TYPE 2 DIABETES MELLITUS WITHOUT COMPLICATION, WITH LONG-TERM CURRENT USE OF INSULIN (HCC): ICD-10-CM

## 2021-12-17 DIAGNOSIS — E11.9 TYPE 2 DIABETES MELLITUS WITHOUT COMPLICATION, WITH LONG-TERM CURRENT USE OF INSULIN (HCC): ICD-10-CM

## 2021-12-17 DIAGNOSIS — N18.30 STAGE 3 CHRONIC KIDNEY DISEASE, UNSPECIFIED WHETHER STAGE 3A OR 3B CKD (HCC): ICD-10-CM

## 2021-12-17 PROCEDURE — 84100 ASSAY OF PHOSPHORUS: CPT

## 2021-12-17 PROCEDURE — 85014 HEMATOCRIT: CPT

## 2021-12-17 PROCEDURE — 83036 HEMOGLOBIN GLYCOSYLATED A1C: CPT

## 2021-12-17 PROCEDURE — 80053 COMPREHEN METABOLIC PANEL: CPT

## 2021-12-17 PROCEDURE — 85018 HEMOGLOBIN: CPT

## 2021-12-17 PROCEDURE — 84443 ASSAY THYROID STIM HORMONE: CPT

## 2021-12-17 PROCEDURE — 36415 COLL VENOUS BLD VENIPUNCTURE: CPT

## 2021-12-17 PROCEDURE — 80061 LIPID PANEL: CPT

## 2021-12-22 ENCOUNTER — OFFICE VISIT (OUTPATIENT)
Dept: INTERNAL MEDICINE CLINIC | Facility: CLINIC | Age: 76
End: 2021-12-22
Payer: MEDICARE

## 2021-12-22 ENCOUNTER — TELEPHONE (OUTPATIENT)
Dept: INTERNAL MEDICINE CLINIC | Facility: CLINIC | Age: 76
End: 2021-12-22

## 2021-12-22 VITALS
TEMPERATURE: 98 F | DIASTOLIC BLOOD PRESSURE: 70 MMHG | HEIGHT: 62 IN | OXYGEN SATURATION: 99 % | BODY MASS INDEX: 28.89 KG/M2 | WEIGHT: 157 LBS | SYSTOLIC BLOOD PRESSURE: 150 MMHG | HEART RATE: 91 BPM

## 2021-12-22 DIAGNOSIS — I49.9 IRREGULAR CARDIAC RHYTHM: ICD-10-CM

## 2021-12-22 DIAGNOSIS — M17.11 PRIMARY OSTEOARTHRITIS OF RIGHT KNEE: ICD-10-CM

## 2021-12-22 DIAGNOSIS — E11.9 TYPE 2 DIABETES MELLITUS WITHOUT COMPLICATION, WITHOUT LONG-TERM CURRENT USE OF INSULIN (HCC): Primary | ICD-10-CM

## 2021-12-22 DIAGNOSIS — I10 ESSENTIAL HYPERTENSION: ICD-10-CM

## 2021-12-22 DIAGNOSIS — E78.2 HYPERLIPIDEMIA, MIXED: ICD-10-CM

## 2021-12-22 PROCEDURE — 99214 OFFICE O/P EST MOD 30 MIN: CPT | Performed by: INTERNAL MEDICINE

## 2021-12-22 PROCEDURE — 93000 ELECTROCARDIOGRAM COMPLETE: CPT | Performed by: INTERNAL MEDICINE

## 2021-12-22 RX ORDER — HYDRALAZINE HYDROCHLORIDE 50 MG/1
50 TABLET, FILM COATED ORAL 2 TIMES DAILY
Qty: 180 TABLET | Refills: 3 | Status: SHIPPED | OUTPATIENT
Start: 2021-12-22

## 2021-12-22 RX ORDER — NETARSUDIL 0.2 MG/ML
SOLUTION/ DROPS OPHTHALMIC; TOPICAL
COMMUNITY
Start: 2021-12-16

## 2021-12-22 RX ORDER — INSULIN ASPART 100 [IU]/ML
INJECTION, SOLUTION INTRAVENOUS; SUBCUTANEOUS
Qty: 15 ML | Refills: 1 | COMMUNITY
Start: 2021-12-22

## 2021-12-22 NOTE — PROGRESS NOTES
Beverly Cummings is a 64year old female who presents for       Check at 3 mo. Interim visit 11/1/21--R great toe gout resolved with medrol dose pack. R knee pain--a little better --but not much--since Dr Gabriel Cabrera gave brianna shot 11/18/21.  Xray R kn Father          age [de-identified]   • Other (unknown cause of death) Mother          age 80   • Hypertension Brother    • Other (1 brother) Other         living and well   • Other (1 son, 1 daughter) Other    • Breast Cancer Neg       Social History:   Social mg bid. Enalapril stopped in 11/2021 for incr K. Off hctz 25 mg since in hosp 9/20 due to renal insuff. DJD right knee- Xray R knee 11/18/21--severe DJD.  [facility] gave brianna shot 11/18/21. Hyperlipidemia, mxd--On simvastatin 5 mg daily.   TG 32 Lifeline screening 9/26/18--carotids -mild R and L, no AF, No AAA, No PAD, Low prob of osteoporosis. 5/12/21–CMP–K4.6, creat 1.72, GFR 28 (stable).   9/20/21–A1c 7.2.  12/17/21--Dr Salomon/my orders--H/H cmp tsh lipid A1c- creat 1.59 GFR 31 K4.7, LDL 6

## 2021-12-22 NOTE — TELEPHONE ENCOUNTER
To nursing, please tell pt EKG done at today's visit is ok. The rhythm is normal. No change to July 2020 EKG. Thanks.

## 2021-12-22 NOTE — PATIENT INSTRUCTIONS
Increase dinner time novolog insulin to 8 units before dinner. Increase hydralazine 50 mg twice a day.

## 2022-01-12 DIAGNOSIS — E11.9 TYPE 2 DIABETES MELLITUS WITHOUT COMPLICATION, WITHOUT LONG-TERM CURRENT USE OF INSULIN (HCC): ICD-10-CM

## 2022-01-12 DIAGNOSIS — I10 ESSENTIAL HYPERTENSION: ICD-10-CM

## 2022-01-12 DIAGNOSIS — E78.2 HYPERLIPIDEMIA, MIXED: ICD-10-CM

## 2022-01-12 RX ORDER — SIMVASTATIN 5 MG
TABLET ORAL
Qty: 90 TABLET | Refills: 3 | Status: SHIPPED | OUTPATIENT
Start: 2022-01-12

## 2022-02-08 ENCOUNTER — TELEPHONE (OUTPATIENT)
Dept: INTERNAL MEDICINE CLINIC | Facility: CLINIC | Age: 77
End: 2022-02-08

## 2022-02-08 NOTE — TELEPHONE ENCOUNTER
Spoke with patient who confirms she is looking for her 6 month follow up diagnostic mammo after previous mammo in August. Advised MD already placed the order in August. She has the central scheduling number and will call for an appt.

## 2022-02-08 NOTE — TELEPHONE ENCOUNTER
Patient is calling to request an order for a Mammogram to be entered in the system  Please call patient when complete [phone]

## 2022-02-13 RX ORDER — GLIMEPIRIDE 4 MG/1
TABLET ORAL
Qty: 180 TABLET | Refills: 3 | Status: SHIPPED | OUTPATIENT
Start: 2022-02-13

## 2022-02-16 ENCOUNTER — HOSPITAL ENCOUNTER (OUTPATIENT)
Dept: MAMMOGRAPHY | Facility: HOSPITAL | Age: 77
Discharge: HOME | End: 2022-02-16
Attending: INTERNAL MEDICINE
Payer: MEDICARE

## 2022-02-16 DIAGNOSIS — R92.8 ABNORMAL MAMMOGRAM OF BOTH BREASTS: ICD-10-CM

## 2022-02-16 PROCEDURE — 77062 BREAST TOMOSYNTHESIS BI: CPT | Performed by: INTERNAL MEDICINE

## 2022-02-16 PROCEDURE — 77066 DX MAMMO INCL CAD BI: CPT | Performed by: INTERNAL MEDICINE

## 2022-03-14 RX ORDER — BLOOD-GLUCOSE METER
KIT MISCELLANEOUS
Qty: 300 STRIP | Refills: 3 | Status: SHIPPED | OUTPATIENT
Start: 2022-03-14

## 2022-04-22 ENCOUNTER — TELEPHONE (OUTPATIENT)
Dept: HEMATOLOGY/ONCOLOGY | Facility: HOSPITAL | Age: 77
End: 2022-04-22

## 2022-04-26 ENCOUNTER — TELEPHONE (OUTPATIENT)
Dept: HEMATOLOGY/ONCOLOGY | Facility: HOSPITAL | Age: 77
End: 2022-04-26

## 2022-04-26 ENCOUNTER — APPOINTMENT (OUTPATIENT)
Dept: HEMATOLOGY/ONCOLOGY | Facility: HOSPITAL | Age: 77
End: 2022-04-26
Attending: INTERNAL MEDICINE
Payer: MEDICARE

## 2022-04-26 NOTE — TELEPHONE ENCOUNTER
----- Message from Kirti Boyle RN sent at 4/21/2022 11:56 AM CDT -----  Regarding: please reschedule  Pt missed appointments today. Please call and reschedule lab and MD thanks!     Antonino Hought

## 2022-05-03 ENCOUNTER — LAB ENCOUNTER (OUTPATIENT)
Dept: LAB | Age: 77
End: 2022-05-03
Attending: INTERNAL MEDICINE
Payer: MEDICARE

## 2022-05-03 DIAGNOSIS — E11.9 TYPE 2 DIABETES MELLITUS WITHOUT COMPLICATION, WITHOUT LONG-TERM CURRENT USE OF INSULIN (HCC): ICD-10-CM

## 2022-05-03 LAB
EST. AVERAGE GLUCOSE BLD GHB EST-MCNC: 134 MG/DL (ref 68–126)
HBA1C MFR BLD: 6.3 % (ref ?–5.7)

## 2022-05-03 PROCEDURE — 36415 COLL VENOUS BLD VENIPUNCTURE: CPT

## 2022-05-03 PROCEDURE — 83036 HEMOGLOBIN GLYCOSYLATED A1C: CPT

## 2022-05-09 ENCOUNTER — OFFICE VISIT (OUTPATIENT)
Dept: INTERNAL MEDICINE CLINIC | Facility: CLINIC | Age: 77
End: 2022-05-09
Payer: MEDICARE

## 2022-05-09 VITALS
HEART RATE: 89 BPM | DIASTOLIC BLOOD PRESSURE: 64 MMHG | BODY MASS INDEX: 27.97 KG/M2 | WEIGHT: 152 LBS | HEIGHT: 62 IN | SYSTOLIC BLOOD PRESSURE: 130 MMHG | TEMPERATURE: 98 F | OXYGEN SATURATION: 98 %

## 2022-05-09 DIAGNOSIS — E78.2 HYPERLIPIDEMIA, MIXED: ICD-10-CM

## 2022-05-09 DIAGNOSIS — E11.9 TYPE 2 DIABETES MELLITUS WITHOUT COMPLICATION, WITHOUT LONG-TERM CURRENT USE OF INSULIN (HCC): ICD-10-CM

## 2022-05-09 DIAGNOSIS — Z00.00 MEDICARE ANNUAL WELLNESS VISIT, SUBSEQUENT: Primary | ICD-10-CM

## 2022-05-09 DIAGNOSIS — N18.32 STAGE 3B CHRONIC KIDNEY DISEASE (HCC): ICD-10-CM

## 2022-05-09 DIAGNOSIS — Z85.528 HISTORY OF RENAL CELL CARCINOMA: ICD-10-CM

## 2022-05-09 DIAGNOSIS — I10 ESSENTIAL HYPERTENSION: ICD-10-CM

## 2022-05-09 DIAGNOSIS — Z90.5 S/P NEPHRECTOMY: ICD-10-CM

## 2022-05-09 RX ORDER — INSULIN ASPART 100 [IU]/ML
INJECTION, SOLUTION INTRAVENOUS; SUBCUTANEOUS
Qty: 15 ML | Refills: 1 | COMMUNITY
Start: 2022-05-09

## 2022-05-09 RX ORDER — GLIMEPIRIDE 4 MG/1
4 TABLET ORAL
Qty: 180 TABLET | Refills: 3 | COMMUNITY
Start: 2022-05-09

## 2022-05-09 RX ORDER — INSULIN DETEMIR 100 [IU]/ML
30 INJECTION, SOLUTION SUBCUTANEOUS DAILY
Qty: 15 ML | Refills: 3 | COMMUNITY
Start: 2022-05-09

## 2022-05-09 NOTE — PATIENT INSTRUCTIONS
Cut levemir insulin 30 units per day. Cut glimepiride 4 mg every morning. Fasting lab before next visit.

## 2022-05-25 ENCOUNTER — APPOINTMENT (OUTPATIENT)
Dept: GENERAL RADIOLOGY | Facility: HOSPITAL | Age: 77
End: 2022-05-25
Attending: EMERGENCY MEDICINE
Payer: MEDICARE

## 2022-05-25 ENCOUNTER — OFFICE VISIT (OUTPATIENT)
Dept: INTERNAL MEDICINE CLINIC | Facility: CLINIC | Age: 77
End: 2022-05-25
Payer: MEDICARE

## 2022-05-25 ENCOUNTER — TELEPHONE (OUTPATIENT)
Dept: INTERNAL MEDICINE CLINIC | Facility: CLINIC | Age: 77
End: 2022-05-25

## 2022-05-25 ENCOUNTER — HOSPITAL ENCOUNTER (INPATIENT)
Facility: HOSPITAL | Age: 77
LOS: 3 days | Discharge: HOME | End: 2022-05-28
Attending: EMERGENCY MEDICINE | Admitting: HOSPITALIST
Payer: MEDICARE

## 2022-05-25 VITALS
TEMPERATURE: 99 F | BODY MASS INDEX: 28.34 KG/M2 | SYSTOLIC BLOOD PRESSURE: 148 MMHG | OXYGEN SATURATION: 96 % | DIASTOLIC BLOOD PRESSURE: 66 MMHG | WEIGHT: 154 LBS | HEART RATE: 103 BPM | HEIGHT: 62 IN

## 2022-05-25 DIAGNOSIS — R06.02 SHORTNESS OF BREATH: Primary | ICD-10-CM

## 2022-05-25 DIAGNOSIS — R06.00 PAROXYSMAL NOCTURNAL DYSPNEA: ICD-10-CM

## 2022-05-25 DIAGNOSIS — I44.7 LBBB (LEFT BUNDLE BRANCH BLOCK): ICD-10-CM

## 2022-05-25 DIAGNOSIS — I50.9 ACUTE ON CHRONIC CONGESTIVE HEART FAILURE, UNSPECIFIED HEART FAILURE TYPE (HCC): Primary | ICD-10-CM

## 2022-05-25 LAB
ANION GAP SERPL CALC-SCNC: 7 MMOL/L (ref 0–18)
BASOPHILS # BLD AUTO: 0.04 X10(3) UL (ref 0–0.2)
BASOPHILS NFR BLD AUTO: 0.4 %
BUN BLD-MCNC: 25 MG/DL (ref 7–18)
BUN/CREAT SERPL: 15.2 (ref 10–20)
CALCIUM BLD-MCNC: 8.7 MG/DL (ref 8.5–10.1)
CHLORIDE SERPL-SCNC: 115 MMOL/L (ref 98–112)
CO2 SERPL-SCNC: 22 MMOL/L (ref 21–32)
CREAT BLD-MCNC: 1.64 MG/DL
D DIMER PPP FEU-MCNC: 0.7 UG/ML FEU (ref ?–0.78)
DEPRECATED RDW RBC AUTO: 46.9 FL (ref 35.1–46.3)
EOSINOPHIL # BLD AUTO: 0.36 X10(3) UL (ref 0–0.7)
EOSINOPHIL NFR BLD AUTO: 3.5 %
ERYTHROCYTE [DISTWIDTH] IN BLOOD BY AUTOMATED COUNT: 14 % (ref 11–15)
GLUCOSE BLD-MCNC: 152 MG/DL (ref 70–99)
GLUCOSE BLDC GLUCOMTR-MCNC: 219 MG/DL (ref 70–99)
HCT VFR BLD AUTO: 33.1 %
HGB BLD-MCNC: 10.5 G/DL
IMM GRANULOCYTES # BLD AUTO: 0.03 X10(3) UL (ref 0–1)
IMM GRANULOCYTES NFR BLD: 0.3 %
LYMPHOCYTES # BLD AUTO: 1.16 X10(3) UL (ref 1–4)
LYMPHOCYTES NFR BLD AUTO: 11.2 %
MAGNESIUM SERPL-MCNC: 2.2 MG/DL (ref 1.6–2.6)
MCH RBC QN AUTO: 29 PG (ref 26–34)
MCHC RBC AUTO-ENTMCNC: 31.7 G/DL (ref 31–37)
MCV RBC AUTO: 91.4 FL
MONOCYTES # BLD AUTO: 0.63 X10(3) UL (ref 0.1–1)
MONOCYTES NFR BLD AUTO: 6.1 %
NEUTROPHILS # BLD AUTO: 8.12 X10 (3) UL (ref 1.5–7.7)
NEUTROPHILS # BLD AUTO: 8.12 X10(3) UL (ref 1.5–7.7)
NEUTROPHILS NFR BLD AUTO: 78.5 %
NT-PROBNP SERPL-MCNC: 7963 PG/ML (ref ?–450)
OSMOLALITY SERPL CALC.SUM OF ELEC: 305 MOSM/KG (ref 275–295)
PLATELET # BLD AUTO: 326 10(3)UL (ref 150–450)
POTASSIUM SERPL-SCNC: 3.6 MMOL/L (ref 3.5–5.1)
PROCALCITONIN SERPL-MCNC: 0.07 NG/ML (ref ?–0.16)
RBC # BLD AUTO: 3.62 X10(6)UL
SARS-COV-2 RNA RESP QL NAA+PROBE: NOT DETECTED
SODIUM SERPL-SCNC: 144 MMOL/L (ref 136–145)
TROPONIN I HIGH SENSITIVITY: 39 NG/L
WBC # BLD AUTO: 10.3 X10(3) UL (ref 4–11)

## 2022-05-25 PROCEDURE — 99214 OFFICE O/P EST MOD 30 MIN: CPT | Performed by: INTERNAL MEDICINE

## 2022-05-25 PROCEDURE — 71045 X-RAY EXAM CHEST 1 VIEW: CPT | Performed by: EMERGENCY MEDICINE

## 2022-05-25 PROCEDURE — 99223 1ST HOSP IP/OBS HIGH 75: CPT | Performed by: HOSPITALIST

## 2022-05-25 PROCEDURE — 93000 ELECTROCARDIOGRAM COMPLETE: CPT | Performed by: INTERNAL MEDICINE

## 2022-05-25 RX ORDER — HYDRALAZINE HYDROCHLORIDE 50 MG/1
50 TABLET, FILM COATED ORAL 2 TIMES DAILY
Status: DISCONTINUED | OUTPATIENT
Start: 2022-05-25 | End: 2022-05-26

## 2022-05-25 RX ORDER — PRAVASTATIN SODIUM 20 MG
10 TABLET ORAL NIGHTLY
Refills: 3 | Status: DISCONTINUED | OUTPATIENT
Start: 2022-05-25 | End: 2022-05-28

## 2022-05-25 RX ORDER — ACETAMINOPHEN 325 MG/1
650 TABLET ORAL EVERY 6 HOURS PRN
Status: DISCONTINUED | OUTPATIENT
Start: 2022-05-25 | End: 2022-05-28

## 2022-05-25 RX ORDER — POTASSIUM CHLORIDE 20 MEQ/1
40 TABLET, EXTENDED RELEASE ORAL ONCE
Status: COMPLETED | OUTPATIENT
Start: 2022-05-25 | End: 2022-05-25

## 2022-05-25 RX ORDER — ASPIRIN 81 MG/1
81 TABLET ORAL DAILY
Status: DISCONTINUED | OUTPATIENT
Start: 2022-05-26 | End: 2022-05-27

## 2022-05-25 RX ORDER — NICOTINE POLACRILEX 4 MG
30 LOZENGE BUCCAL
Status: DISCONTINUED | OUTPATIENT
Start: 2022-05-25 | End: 2022-05-28

## 2022-05-25 RX ORDER — DEXTROSE MONOHYDRATE 25 G/50ML
50 INJECTION, SOLUTION INTRAVENOUS
Status: DISCONTINUED | OUTPATIENT
Start: 2022-05-25 | End: 2022-05-28

## 2022-05-25 RX ORDER — ONDANSETRON 2 MG/ML
4 INJECTION INTRAMUSCULAR; INTRAVENOUS EVERY 6 HOURS PRN
Status: DISCONTINUED | OUTPATIENT
Start: 2022-05-25 | End: 2022-05-28

## 2022-05-25 RX ORDER — LATANOPROST 50 UG/ML
1 SOLUTION/ DROPS OPHTHALMIC NIGHTLY
Status: DISCONTINUED | OUTPATIENT
Start: 2022-05-25 | End: 2022-05-28

## 2022-05-25 RX ORDER — FUROSEMIDE 10 MG/ML
40 INJECTION INTRAMUSCULAR; INTRAVENOUS
Status: DISCONTINUED | OUTPATIENT
Start: 2022-05-25 | End: 2022-05-27

## 2022-05-25 RX ORDER — NITROGLYCERIN 0.4 MG/1
0.4 TABLET SUBLINGUAL EVERY 5 MIN PRN
Status: DISCONTINUED | OUTPATIENT
Start: 2022-05-25 | End: 2022-05-28

## 2022-05-25 RX ORDER — TEMAZEPAM 15 MG/1
15 CAPSULE ORAL NIGHTLY PRN
Status: DISCONTINUED | OUTPATIENT
Start: 2022-05-25 | End: 2022-05-28

## 2022-05-25 RX ORDER — HEPARIN SODIUM 5000 [USP'U]/ML
5000 INJECTION, SOLUTION INTRAVENOUS; SUBCUTANEOUS EVERY 12 HOURS SCHEDULED
Status: DISCONTINUED | OUTPATIENT
Start: 2022-05-25 | End: 2022-05-28

## 2022-05-25 RX ORDER — POTASSIUM CHLORIDE 20 MEQ/1
40 TABLET, EXTENDED RELEASE ORAL EVERY 4 HOURS
Status: DISPENSED | OUTPATIENT
Start: 2022-05-25 | End: 2022-05-26

## 2022-05-25 RX ORDER — METOCLOPRAMIDE HYDROCHLORIDE 5 MG/ML
10 INJECTION INTRAMUSCULAR; INTRAVENOUS EVERY 8 HOURS PRN
Status: DISCONTINUED | OUTPATIENT
Start: 2022-05-25 | End: 2022-05-26

## 2022-05-25 RX ORDER — FUROSEMIDE 10 MG/ML
40 INJECTION INTRAMUSCULAR; INTRAVENOUS ONCE
Status: COMPLETED | OUTPATIENT
Start: 2022-05-25 | End: 2022-05-25

## 2022-05-25 RX ORDER — NICOTINE POLACRILEX 4 MG
15 LOZENGE BUCCAL
Status: DISCONTINUED | OUTPATIENT
Start: 2022-05-25 | End: 2022-05-28

## 2022-05-25 NOTE — TELEPHONE ENCOUNTER
Spoke to patient and relayed MD message and instructions, patient verbalizes understanding and agrees with plan. She does have a home COVID test and will complete this before coming in. If positive, she will let us know. If negative, she will come to the office 10 minutes early and call from the parking lot for check-in and confirmation that she can come in to the office.

## 2022-05-25 NOTE — TELEPHONE ENCOUNTER
To Dr. Malou Wadsworth-- patient reports having some shortness of breath with exertion for the last couple of days. She feels she recovers quickly when she rests. She does not sound short of breath while on the phone. She feels a little congested but denies any fevers, cough, sore throat. She has not been COVID tested. She has also been waking up at night wheezing. The wheezing will subside when she sits up. She denies any history of asthma or inhaler use. OK to keep on the schedule for 3pm today? Patient will expect call back.

## 2022-05-25 NOTE — TELEPHONE ENCOUNTER
Pt scheduled appt for today at 3:00 for wheezing at night and a bit out of breath during day  No COVID symptoms and no exposure to [address] [place] [address] for patient to be seen in the office?   Tasked to nursing

## 2022-05-25 NOTE — TELEPHONE ENCOUNTER
To nursing, ask patient to do home COVID test if possible. If positive, let us know. If negative, can be seen but use respiratory protocol. If she cannot do a home COVID test, then be seen with respiratory protocol. Thanks.

## 2022-05-25 NOTE — ED INITIAL ASSESSMENT (HPI)
Patient arrives via wheelchair from her PCP office with complaints of SOB x3-4 days, wheezing, difficulty breathing with laying flat.

## 2022-05-26 ENCOUNTER — APPOINTMENT (OUTPATIENT)
Dept: CV DIAGNOSTICS | Facility: HOSPITAL | Age: 77
End: 2022-05-26
Attending: HOSPITALIST
Payer: MEDICARE

## 2022-05-26 LAB
ANION GAP SERPL CALC-SCNC: 7 MMOL/L (ref 0–18)
BASOPHILS # BLD AUTO: 0.03 X10(3) UL (ref 0–0.2)
BASOPHILS NFR BLD AUTO: 0.3 %
BUN BLD-MCNC: 23 MG/DL (ref 7–18)
BUN/CREAT SERPL: 13.9 (ref 10–20)
CALCIUM BLD-MCNC: 8.8 MG/DL (ref 8.5–10.1)
CHLORIDE SERPL-SCNC: 114 MMOL/L (ref 98–112)
CO2 SERPL-SCNC: 23 MMOL/L (ref 21–32)
CREAT BLD-MCNC: 1.65 MG/DL
DEPRECATED RDW RBC AUTO: 46.5 FL (ref 35.1–46.3)
EOSINOPHIL # BLD AUTO: 0.32 X10(3) UL (ref 0–0.7)
EOSINOPHIL NFR BLD AUTO: 3.3 %
ERYTHROCYTE [DISTWIDTH] IN BLOOD BY AUTOMATED COUNT: 14 % (ref 11–15)
GLUCOSE BLD-MCNC: 128 MG/DL (ref 70–99)
GLUCOSE BLDC GLUCOMTR-MCNC: 118 MG/DL (ref 70–99)
GLUCOSE BLDC GLUCOMTR-MCNC: 127 MG/DL (ref 70–99)
GLUCOSE BLDC GLUCOMTR-MCNC: 89 MG/DL (ref 70–99)
GLUCOSE BLDC GLUCOMTR-MCNC: 95 MG/DL (ref 70–99)
HCT VFR BLD AUTO: 31.3 %
HGB BLD-MCNC: 10.1 G/DL
IMM GRANULOCYTES # BLD AUTO: 0.04 X10(3) UL (ref 0–1)
IMM GRANULOCYTES NFR BLD: 0.4 %
LYMPHOCYTES # BLD AUTO: 0.98 X10(3) UL (ref 1–4)
LYMPHOCYTES NFR BLD AUTO: 10.2 %
MAGNESIUM SERPL-MCNC: 2.2 MG/DL (ref 1.6–2.6)
MCH RBC QN AUTO: 29.4 PG (ref 26–34)
MCHC RBC AUTO-ENTMCNC: 32.3 G/DL (ref 31–37)
MCV RBC AUTO: 91.3 FL
MONOCYTES # BLD AUTO: 0.72 X10(3) UL (ref 0.1–1)
MONOCYTES NFR BLD AUTO: 7.5 %
NEUTROPHILS # BLD AUTO: 7.55 X10 (3) UL (ref 1.5–7.7)
NEUTROPHILS # BLD AUTO: 7.55 X10(3) UL (ref 1.5–7.7)
NEUTROPHILS NFR BLD AUTO: 78.3 %
OSMOLALITY SERPL CALC.SUM OF ELEC: 303 MOSM/KG (ref 275–295)
PLATELET # BLD AUTO: 303 10(3)UL (ref 150–450)
POTASSIUM SERPL-SCNC: 4.9 MMOL/L (ref 3.5–5.1)
POTASSIUM SERPL-SCNC: 4.9 MMOL/L (ref 3.5–5.1)
RBC # BLD AUTO: 3.43 X10(6)UL
SODIUM SERPL-SCNC: 144 MMOL/L (ref 136–145)
WBC # BLD AUTO: 9.6 X10(3) UL (ref 4–11)

## 2022-05-26 PROCEDURE — 93306 TTE W/DOPPLER COMPLETE: CPT | Performed by: HOSPITALIST

## 2022-05-26 PROCEDURE — 99233 SBSQ HOSP IP/OBS HIGH 50: CPT | Performed by: INTERNAL MEDICINE

## 2022-05-26 RX ORDER — METOCLOPRAMIDE HYDROCHLORIDE 5 MG/ML
5 INJECTION INTRAMUSCULAR; INTRAVENOUS EVERY 8 HOURS PRN
Status: DISCONTINUED | OUTPATIENT
Start: 2022-05-26 | End: 2022-05-28

## 2022-05-26 RX ORDER — HYDRALAZINE HYDROCHLORIDE 50 MG/1
50 TABLET, FILM COATED ORAL 3 TIMES DAILY
Status: DISCONTINUED | OUTPATIENT
Start: 2022-05-26 | End: 2022-05-27

## 2022-05-26 RX ORDER — AMLODIPINE BESYLATE 10 MG/1
10 TABLET ORAL DAILY
Status: DISCONTINUED | OUTPATIENT
Start: 2022-05-26 | End: 2022-05-27

## 2022-05-26 NOTE — PLAN OF CARE
Problem: Patient Centered Care  Goal: Patient preferences are identified and integrated in the patient's plan of care  Description: Interventions:  - What would you like us to know as we care for you? From home Alone  - Provide timely, complete, and accurate information to patient/family  - Incorporate patient and family knowledge, values, beliefs, and cultural backgrounds into the planning and delivery of care  - Encourage patient/family to participate in care and decision-making at the level they choose  - Honor patient and family perspectives and choices  Outcome: Progressing     Problem: Patient/Family Goals  Goal: Patient/Family Long Term Goal  Description: Patient's Long Term Goal: Go home    Interventions:  - Ask questions about diagnosis  - Ask about new medications if needed  - F/U with PCP  - See additional Care Plan goals for specific interventions  Outcome: Progressing  Goal: Patient/Family Short Term Goal  Description: Patient's Short Term Goal: Breathe better    Interventions:   - IV Lasix  - 2D echo  - Cardiology  - See additional Care Plan goals for specific interventions  Outcome: Progressing     Problem: CARDIOVASCULAR - ADULT  Goal: Maintains optimal cardiac output and hemodynamic stability  Description: INTERVENTIONS:  - Monitor vital signs, rhythm, and trends  - Monitor for bleeding, hypotension and signs of decreased cardiac output  - Evaluate effectiveness of vasoactive medications to optimize hemodynamic stability  - Monitor arterial and/or venous puncture sites for bleeding and/or hematoma  - Assess quality of pulses, skin color and temperature  - Assess for signs of decreased coronary artery perfusion - ex.  Angina  - Evaluate fluid balance, assess for edema, trend weights  Outcome: Progressing  Goal: Absence of cardiac arrhythmias or at baseline  Description: INTERVENTIONS:  - Continuous cardiac monitoring, monitor vital signs, obtain 12 lead EKG if indicated  - Evaluate effectiveness of antiarrhythmic and heart rate control medications as ordered  - Initiate emergency measures for life threatening arrhythmias  - Monitor electrolytes and administer replacement therapy as ordered  Outcome: Progressing     Problem: METABOLIC/FLUID AND ELECTROLYTES - ADULT  Goal: Glucose maintained within prescribed range  Description: INTERVENTIONS:  - Monitor Blood Glucose as ordered  - Assess for signs and symptoms of hyperglycemia and hypoglycemia  - Administer ordered medications to maintain glucose within target range  - Assess barriers to adequate nutritional intake and initiate nutrition consult as needed  - Instruct patient on self management of diabetes  Outcome: Progressing  Goal: Electrolytes maintained within normal limits  Description: INTERVENTIONS:  - Monitor labs and rhythm and assess patient for signs and symptoms of electrolyte imbalances  - Administer electrolyte replacement as ordered  - Monitor response to electrolyte replacements, including rhythm and repeat lab results as appropriate  - Fluid restriction as ordered  - Instruct patient on fluid and nutrition restrictions as appropriate  Outcome: Progressing  Goal: Hemodynamic stability and optimal renal function maintained  Description: INTERVENTIONS:  - Monitor labs and assess for signs and symptoms of volume excess or deficit  - Monitor intake, output and patient weight  - Monitor urine specific gravity, serum osmolarity and serum sodium as indicated or ordered  - Monitor response to interventions for patient's volume status, including labs, urine output, blood pressure (other measures as available)  - Encourage oral intake as appropriate  - Instruct patient on fluid and nutrition restrictions as appropriate  Outcome: Progressing     Pt alert and oriented X 4. No complaints throughout the day. Pt on room air. Pt had echo completed today. Pt NPO at midnight. Safety precautions in place, call light within reach.

## 2022-05-26 NOTE — PROGRESS NOTES
[facility] Pharmacy Note:  Renal Dose Adjustment for Metoclopramide (REGLAN)    Sridevi Veras has been prescribed Metoclopramide (REGLAN) 10 mg every 8 hours as needed for nausea/vomiting,. Estimated Creatinine Clearance: 22.2 mL/min (A) (based on SCr of 1.65 mg/dL (H)). Calculated creatinine clearance is < 40 ml/min, therefore, the dose of Metoclopramide (REGLAN) has been changed to 5 mg every 8 hours as needed for nausea/vomiting per P&T approved protocol. Pharmacy will continue to follow, and if renal function improves, will resume the original order.        Thank you,  Clementine Arenas, PharmD  5/26/2022 8:28 AM

## 2022-05-26 NOTE — ED QUICK NOTES
Orders for admission, patient is aware of plan and ready to go upstairs. Any questions, please call ED RN Demetri Martinez at extension [phone]. Patient Covid vaccination status: Fully vaccinated     COVID Test Ordered in ED: Rapid SARS-CoV-2 by PCR    COVID Suspicion at Admission: Low clinical suspicion for COVID    Running Infusions:  None    Mental Status/LOC at time of transport: a/o x 4    Other pertinent information: Shortness of breath with orthopnea x 2 days, negative home covid test, no prior history of this.    CIWA score: N/A   NIH score:  N/A

## 2022-05-26 NOTE — H&P
[facility]    PATIENT'S NAME: Viola Karimi   ATTENDING PHYSICIAN: Kristie Guzmán MD   PATIENT ACCOUNT#:   [de-identified]    LOCATION:  [place] [address]  MEDICAL RECORD #:   [id number]       YOB: 1945  ADMISSION DATE:       05/25/2022    HISTORY AND PHYSICAL EXAMINATION    CHIEF COMPLAINT:  Heart failure. HISTORY OF PRESENT ILLNESS:  The patient is a 70-year-old  female who came into the emergency department for evaluation of progressive dyspnea on exertion, orthopnea for the last 4-5 days. Noted to have leg edema in the emergency room. EKG showed normal sinus rhythm with left bundle branch block which is chronic according to the record. CBC, chemistry, chest x-ray, ProBNP are still pending. PAST MEDICAL HISTORY:  Hypertension, hyperlipidemia, diabetes mellitus type 2, diverticulosis, generalized osteoarthritis, glaucoma, chronic kidney disease stage 3, anemia of chronic kidney disease. She had a negative stress test in 2020. According to the records, she had a chronic left bundle branch block. PAST SURGICAL HISTORY:  Right laparoscopic radical nephrectomy for renal cell carcinoma, cholecystectomy, and cataract procedure. MEDICATIONS:  Please see medication reconciliation list.     FAMILY HISTORY:  Positive for hypertension. Father had cerebrovascular accident. SOCIAL HISTORY:  Ex-tobacco user. No current tobacco, alcohol, or drug use. Lives with her family. Tries to comply with a low-salt diet. REVIEW OF SYSTEMS:  The patient said for the last week at least, she has been having progressive leg edema, dyspnea on exertion, orthopnea. She had to wake up multiple times in the night to sit at the edge of the bed to catch her breath. No chest pain. No abdominal pain. Other 12-point review of systems negative. PHYSICAL EXAMINATION:    GENERAL:  Alert, oriented to time, place, and person. Visibly dyspneic.   VITAL SIGNS:  Temperature 98.0, pulse 104, respiratory rate 16, blood pressure 149/73, pulse oximetry 97% on room air. HEENT:  Atraumatic. Oropharynx clear. Moist mucous membranes. Normal hard and soft palate. Eyes:  Anicteric sclerae. NECK:  Supple. No lymphadenopathy. Positive jugular venous distention. LUNGS:  Crackles auscultated on both lung fields. Diminished breathing sounds, both bases. HEART:  Regular rate and rhythm. S1, S2 auscultated. No murmur. ABDOMEN:  Soft, nondistended. No tenderness. Positive bowel sounds. EXTREMITIES:  With +1 edema, both legs. No clubbing or cyanosis. NEUROLOGIC:  Motor and sensory intact. Cranial nerves II through XII intact. ASSESSMENT AND PLAN:    1. Acute congestive heart failure with progressive symptoms, not clear if systolic or diastolic. 2.   Hypertension. 3.   Chronic kidney disease stage 3/4.  4.   Diabetes mellitus type 2. Patient will be admitted to telemetry floor. IV Lasix. A 2D echocardiogram with Doppler. Followup on electrolytes, kidney function, ProBNP, and troponin. Cardiology consult was notified. Monitor Accu-Cheks. DVT prophylaxis. Further recommendations to follow.     Dictated By Ranulfo Howell MD  d: 05/25/2022 19:06:19  t: 05/25/2022 20:31:15  Job [id number]  CT/

## 2022-05-26 NOTE — PLAN OF CARE
Annamarie Haney is alert and oriented. Admission and med rec completed. On RA. Purewick draining yellow urine. No pain complaints overnight. IV Lasix BID. Plan for 2D echo in AM. Discharge home once medically cleared. Problem: Patient Centered Care  Goal: Patient preferences are identified and integrated in the patient's plan of care  Description: Interventions:  - What would you like us to know as we care for you? From home Alone  - Provide timely, complete, and accurate information to patient/family  - Incorporate patient and family knowledge, values, beliefs, and cultural backgrounds into the planning and delivery of care  - Encourage patient/family to participate in care and decision-making at the level they choose  - Honor patient and family perspectives and choices  Outcome: Progressing     Problem: Patient/Family Goals  Goal: Patient/Family Long Term Goal  Description: Patient's Long Term Goal: Go home    Interventions:  - Ask questions about diagnosis  - Ask about new medications if needed  - F/U with PCP  - See additional Care Plan goals for specific interventions  Outcome: Progressing  Goal: Patient/Family Short Term Goal  Description: Patient's Short Term Goal: Breathe better    Interventions:   - IV Lasix  - 2D echo  - Cardiology  - See additional Care Plan goals for specific interventions  Outcome: Progressing     Problem: CARDIOVASCULAR - ADULT  Goal: Maintains optimal cardiac output and hemodynamic stability  Description: INTERVENTIONS:  - Monitor vital signs, rhythm, and trends  - Monitor for bleeding, hypotension and signs of decreased cardiac output  - Evaluate effectiveness of vasoactive medications to optimize hemodynamic stability  - Monitor arterial and/or venous puncture sites for bleeding and/or hematoma  - Assess quality of pulses, skin color and temperature  - Assess for signs of decreased coronary artery perfusion - ex.  Angina  - Evaluate fluid balance, assess for edema, trend weights  Outcome: Progressing  Goal: Absence of cardiac arrhythmias or at baseline  Description: INTERVENTIONS:  - Continuous cardiac monitoring, monitor vital signs, obtain 12 lead EKG if indicated  - Evaluate effectiveness of antiarrhythmic and heart rate control medications as ordered  - Initiate emergency measures for life threatening arrhythmias  - Monitor electrolytes and administer replacement therapy as ordered  Outcome: Progressing     Problem: METABOLIC/FLUID AND ELECTROLYTES - ADULT  Goal: Glucose maintained within prescribed range  Description: INTERVENTIONS:  - Monitor Blood Glucose as ordered  - Assess for signs and symptoms of hyperglycemia and hypoglycemia  - Administer ordered medications to maintain glucose within target range  - Assess barriers to adequate nutritional intake and initiate nutrition consult as needed  - Instruct patient on self management of diabetes  Outcome: Progressing  Goal: Electrolytes maintained within normal limits  Description: INTERVENTIONS:  - Monitor labs and rhythm and assess patient for signs and symptoms of electrolyte imbalances  - Administer electrolyte replacement as ordered  - Monitor response to electrolyte replacements, including rhythm and repeat lab results as appropriate  - Fluid restriction as ordered  - Instruct patient on fluid and nutrition restrictions as appropriate  Outcome: Progressing  Goal: Hemodynamic stability and optimal renal function maintained  Description: INTERVENTIONS:  - Monitor labs and assess for signs and symptoms of volume excess or deficit  - Monitor intake, output and patient weight  - Monitor urine specific gravity, serum osmolarity and serum sodium as indicated or ordered  - Monitor response to interventions for patient's volume status, including labs, urine output, blood pressure (other measures as available)  - Encourage oral intake as appropriate  - Instruct patient on fluid and nutrition restrictions as appropriate  Outcome: Progressing

## 2022-05-27 ENCOUNTER — APPOINTMENT (OUTPATIENT)
Dept: INTERVENTIONAL RADIOLOGY/VASCULAR | Facility: HOSPITAL | Age: 77
End: 2022-05-27
Attending: INTERNAL MEDICINE
Payer: MEDICARE

## 2022-05-27 LAB
ANION GAP SERPL CALC-SCNC: 8 MMOL/L (ref 0–18)
BASOPHILS # BLD AUTO: 0.03 X10(3) UL (ref 0–0.2)
BASOPHILS NFR BLD AUTO: 0.4 %
BUN BLD-MCNC: 31 MG/DL (ref 7–18)
BUN/CREAT SERPL: 16 (ref 10–20)
CALCIUM BLD-MCNC: 8.6 MG/DL (ref 8.5–10.1)
CHLORIDE SERPL-SCNC: 109 MMOL/L (ref 98–112)
CHOLEST SERPL-MCNC: 123 MG/DL (ref ?–200)
CO2 SERPL-SCNC: 26 MMOL/L (ref 21–32)
CREAT BLD-MCNC: 1.94 MG/DL
DEPRECATED RDW RBC AUTO: 46.2 FL (ref 35.1–46.3)
EOSINOPHIL # BLD AUTO: 0.3 X10(3) UL (ref 0–0.7)
EOSINOPHIL NFR BLD AUTO: 4.4 %
ERYTHROCYTE [DISTWIDTH] IN BLOOD BY AUTOMATED COUNT: 13.8 % (ref 11–15)
GLUCOSE BLD-MCNC: 115 MG/DL (ref 70–99)
GLUCOSE BLDC GLUCOMTR-MCNC: 106 MG/DL (ref 70–99)
GLUCOSE BLDC GLUCOMTR-MCNC: 114 MG/DL (ref 70–99)
GLUCOSE BLDC GLUCOMTR-MCNC: 123 MG/DL (ref 70–99)
GLUCOSE BLDC GLUCOMTR-MCNC: 137 MG/DL (ref 70–99)
GLUCOSE BLDC GLUCOMTR-MCNC: 143 MG/DL (ref 70–99)
GLUCOSE BLDC GLUCOMTR-MCNC: 176 MG/DL (ref 70–99)
HCT VFR BLD AUTO: 34 %
HDLC SERPL-MCNC: 41 MG/DL (ref 40–59)
HGB BLD-MCNC: 10.7 G/DL
IMM GRANULOCYTES # BLD AUTO: 0.04 X10(3) UL (ref 0–1)
IMM GRANULOCYTES NFR BLD: 0.6 %
LDLC SERPL CALC-MCNC: 48 MG/DL (ref ?–100)
LYMPHOCYTES # BLD AUTO: 1.08 X10(3) UL (ref 1–4)
LYMPHOCYTES NFR BLD AUTO: 15.7 %
MCH RBC QN AUTO: 28.7 PG (ref 26–34)
MCHC RBC AUTO-ENTMCNC: 31.5 G/DL (ref 31–37)
MCV RBC AUTO: 91.2 FL
MONOCYTES # BLD AUTO: 0.65 X10(3) UL (ref 0.1–1)
MONOCYTES NFR BLD AUTO: 9.5 %
NEUTROPHILS # BLD AUTO: 4.76 X10 (3) UL (ref 1.5–7.7)
NEUTROPHILS # BLD AUTO: 4.76 X10(3) UL (ref 1.5–7.7)
NEUTROPHILS NFR BLD AUTO: 69.4 %
NONHDLC SERPL-MCNC: 82 MG/DL (ref ?–130)
OSMOLALITY SERPL CALC.SUM OF ELEC: 303 MOSM/KG (ref 275–295)
PLATELET # BLD AUTO: 306 10(3)UL (ref 150–450)
POTASSIUM SERPL-SCNC: 3.7 MMOL/L (ref 3.5–5.1)
RBC # BLD AUTO: 3.73 X10(6)UL
SODIUM SERPL-SCNC: 143 MMOL/L (ref 136–145)
TRIGL SERPL-MCNC: 215 MG/DL (ref 30–149)
VLDLC SERPL CALC-MCNC: 30 MG/DL (ref 0–30)
WBC # BLD AUTO: 6.9 X10(3) UL (ref 4–11)

## 2022-05-27 PROCEDURE — 027135Z DILATION OF CORONARY ARTERY, TWO ARTERIES WITH TWO DRUG-ELUTING INTRALUMINAL DEVICES, PERCUTANEOUS APPROACH: ICD-10-PCS | Performed by: INTERNAL MEDICINE

## 2022-05-27 PROCEDURE — B2111ZZ FLUOROSCOPY OF MULTIPLE CORONARY ARTERIES USING LOW OSMOLAR CONTRAST: ICD-10-PCS | Performed by: INTERNAL MEDICINE

## 2022-05-27 PROCEDURE — 99233 SBSQ HOSP IP/OBS HIGH 50: CPT | Performed by: INTERNAL MEDICINE

## 2022-05-27 PROCEDURE — 4A023N7 MEASUREMENT OF CARDIAC SAMPLING AND PRESSURE, LEFT HEART, PERCUTANEOUS APPROACH: ICD-10-PCS | Performed by: INTERNAL MEDICINE

## 2022-05-27 RX ORDER — HEPARIN SODIUM 1000 [USP'U]/ML
INJECTION, SOLUTION INTRAVENOUS; SUBCUTANEOUS
Status: COMPLETED
Start: 2022-05-27 | End: 2022-05-27

## 2022-05-27 RX ORDER — SODIUM CHLORIDE 9 MG/ML
INJECTION, SOLUTION INTRAVENOUS CONTINUOUS
Status: DISCONTINUED | OUTPATIENT
Start: 2022-05-27 | End: 2022-05-28

## 2022-05-27 RX ORDER — CLOPIDOGREL BISULFATE 75 MG/1
75 TABLET ORAL DAILY
Status: DISCONTINUED | OUTPATIENT
Start: 2022-05-28 | End: 2022-05-28

## 2022-05-27 RX ORDER — ASPIRIN 81 MG/1
324 TABLET, CHEWABLE ORAL ONCE
Status: COMPLETED | OUTPATIENT
Start: 2022-05-27 | End: 2022-05-27

## 2022-05-27 RX ORDER — LIDOCAINE HYDROCHLORIDE 20 MG/ML
INJECTION, SOLUTION EPIDURAL; INFILTRATION; INTRACAUDAL; PERINEURAL
Status: COMPLETED
Start: 2022-05-27 | End: 2022-05-27

## 2022-05-27 RX ORDER — CHLORHEXIDINE GLUCONATE 4 G/100ML
30 SOLUTION TOPICAL
Status: ACTIVE | OUTPATIENT
Start: 2022-05-28 | End: 2022-05-28

## 2022-05-27 RX ORDER — SODIUM CHLORIDE 9 MG/ML
INJECTION, SOLUTION INTRAVENOUS CONTINUOUS
Status: ACTIVE | OUTPATIENT
Start: 2022-05-27 | End: 2022-05-27

## 2022-05-27 RX ORDER — ASPIRIN 81 MG/1
81 TABLET ORAL DAILY
Status: DISCONTINUED | OUTPATIENT
Start: 2022-05-28 | End: 2022-05-28

## 2022-05-27 RX ORDER — MIDAZOLAM HYDROCHLORIDE 1 MG/ML
INJECTION INTRAMUSCULAR; INTRAVENOUS
Status: COMPLETED
Start: 2022-05-27 | End: 2022-05-27

## 2022-05-27 RX ORDER — POTASSIUM CHLORIDE 20 MEQ/1
40 TABLET, EXTENDED RELEASE ORAL ONCE
Status: COMPLETED | OUTPATIENT
Start: 2022-05-27 | End: 2022-05-27

## 2022-05-27 RX ORDER — ISOSORBIDE MONONITRATE 30 MG/1
30 TABLET, EXTENDED RELEASE ORAL DAILY
Status: DISCONTINUED | OUTPATIENT
Start: 2022-05-27 | End: 2022-05-28

## 2022-05-27 RX ORDER — CLOPIDOGREL BISULFATE 75 MG/1
TABLET ORAL
Status: COMPLETED
Start: 2022-05-27 | End: 2022-05-27

## 2022-05-27 RX ORDER — HYDRALAZINE HYDROCHLORIDE 25 MG/1
25 TABLET, FILM COATED ORAL EVERY 8 HOURS SCHEDULED
Status: DISCONTINUED | OUTPATIENT
Start: 2022-05-27 | End: 2022-05-28

## 2022-05-27 RX ORDER — METOPROLOL SUCCINATE 25 MG/1
25 TABLET, EXTENDED RELEASE ORAL
Status: DISCONTINUED | OUTPATIENT
Start: 2022-05-27 | End: 2022-05-28

## 2022-05-27 NOTE — PROCEDURES
[facility]    Cardiac Cath Procedure Note    Alexus Grover Patient Status:  Inpatient    1945 MRN [id number]   Location [facility] 3W/SW Attending Angelia Mattson MD   Hosp Day # 2 PCP Farrah Jensen MD       Cardiologist: Jose Madrid MD  Primary Proceduralist: Jose Madrid MD  Procedure Performed: LHC, LV and PCI LAD and diagonal  Date of Procedure: 2022   Indication: HFrEF    Summary of procedure: Successful PCI of the LAD with RO x1, diagonal with RO x1. Low filling pressures with hypotension, 1 L fluid bolus given. Left Ventriculography and hemodynamics:   LV EF not done, CKD  LV EDP 5 mmHg, 1 L fluid bolus given  No gradient across aortic valve      Coronary Angiography  RCA:  Dominant and free of obstructive disease, supplies PDA and PL. Mild PDA disease, 30 to 40% sequential stenoses. Left main:  Free of obstructive disease    Left anterior descendin% mid LAD stenosis. Significant size diagonal takes off just proximal to the stenosis, 80% focal stenosis in the proximal portion of the diagonal.  Remainder of the LAD is free of obstructive disease, supplies multiple other small diagonals which are non-obstructive    Circumflex:  Free of obstructive disease, supplies multiple OM branches which are patent      Diagonal intervention  Lesion Characteristics-not torturous, not calcified. Type non-C lesion. Pre-intervention stenosis 80%, Post intervention stenosis 0%. Pre HAILY 3, Post HAILY 3.      Guide Catheter: EBU 3.75  Wire: Run through down LAD, Fielder FC down diagonal  Pre-dil: None  Stent: 2.5 x 12 mm Synergy RO at 14 adalberto  Post-dil: None      LAD intervention  Lesion Characteristics-not torturous, not calcified. Type non-C lesion. Pre-intervention stenosis 95%, Post intervention stenosis 0%.   Pre HAILY 3, Post HAILY 3.      Guide Catheter: EBU 3.75  Wire: Run through down LAD, Fielder FC down diagonal  Pre-dil: None  Stent: 3 x 28 mm Synergy RO at 16 adalberto  Post-dil: None        Assessment:  CAD status post PCI LAD and diagonal  HFrEF: Ischemic cardiomyopathy, dry by LV filling pressures      Recommendations:  CAD: DAPT aspirin and Plavix  Ischemic cardiomyopathy: Start Imdur in addition to hydralazine, no ACE or ARB due to CKD. Start metoprolol 25 mg XL twice daily. Stop Norvasc to allow blood pressure room for heart failure medication titration      Description of Procedure:   After written informed consent was obtained from the patient, patient was brought to the cardiac catheterization laboratory. Patient was prepped and draped in the usual sterile fashion. Lidocaine 1% was used to infiltrate the right groin for local anesthesia and a 6 Bulgarian introducer sheath was inserted into the right femoral artery via ultrasound guidance and micropuncture kit. Selective coronary angiography performed with JR4 catheter for RCA and JL4 catheter for LCA. Angiography performed in standard projections. 6 [place] JR4 catheter placed in LV for hemodynamics. Selective right femoral angiogram done assess anatomy for closure. Specimen sent to: No specimen collected  Estimated blood loss: 10 cc  Closure:  Perclose       IV was maintained by RN and moderate conscious sedation of versed and fentanyl was given. Patient was assessed and monitoring of oxygen, heart rate and blood pressure by nurse and myself during the exam 35 minutes.       Jade Howard MD  05/27/22

## 2022-05-27 NOTE — PLAN OF CARE
Asad Nair is alert and oriented. On RA. Voiding freely. No pain complaints overnight. NPO. IV Lasix BID. Plan for Cardiology to see. Discharge home once medically cleared. Problem: Patient Centered Care  Goal: Patient preferences are identified and integrated in the patient's plan of care  Description: Interventions:  - What would you like us to know as we care for you? From home Alone  - Provide timely, complete, and accurate information to patient/family  - Incorporate patient and family knowledge, values, beliefs, and cultural backgrounds into the planning and delivery of care  - Encourage patient/family to participate in care and decision-making at the level they choose  - Honor patient and family perspectives and choices  Outcome: Progressing     Problem: Patient/Family Goals  Goal: Patient/Family Long Term Goal  Description: Patient's Long Term Goal: Go home    Interventions:  - Ask questions about diagnosis  - Ask about new medications if needed  - F/U with PCP  - See additional Care Plan goals for specific interventions  Outcome: Progressing  Goal: Patient/Family Short Term Goal  Description: Patient's Short Term Goal: Breathe better    Interventions:   - IV Lasix  - 2D echo  - Cardiology  - See additional Care Plan goals for specific interventions  Outcome: Progressing     Problem: CARDIOVASCULAR - ADULT  Goal: Maintains optimal cardiac output and hemodynamic stability  Description: INTERVENTIONS:  - Monitor vital signs, rhythm, and trends  - Monitor for bleeding, hypotension and signs of decreased cardiac output  - Evaluate effectiveness of vasoactive medications to optimize hemodynamic stability  - Monitor arterial and/or venous puncture sites for bleeding and/or hematoma  - Assess quality of pulses, skin color and temperature  - Assess for signs of decreased coronary artery perfusion - ex.  Angina  - Evaluate fluid balance, assess for edema, trend weights  Outcome: Progressing  Goal: Absence of cardiac arrhythmias or at baseline  Description: INTERVENTIONS:  - Continuous cardiac monitoring, monitor vital signs, obtain 12 lead EKG if indicated  - Evaluate effectiveness of antiarrhythmic and heart rate control medications as ordered  - Initiate emergency measures for life threatening arrhythmias  - Monitor electrolytes and administer replacement therapy as ordered  Outcome: Progressing     Problem: METABOLIC/FLUID AND ELECTROLYTES - ADULT  Goal: Glucose maintained within prescribed range  Description: INTERVENTIONS:  - Monitor Blood Glucose as ordered  - Assess for signs and symptoms of hyperglycemia and hypoglycemia  - Administer ordered medications to maintain glucose within target range  - Assess barriers to adequate nutritional intake and initiate nutrition consult as needed  - Instruct patient on self management of diabetes  Outcome: Progressing  Goal: Electrolytes maintained within normal limits  Description: INTERVENTIONS:  - Monitor labs and rhythm and assess patient for signs and symptoms of electrolyte imbalances  - Administer electrolyte replacement as ordered  - Monitor response to electrolyte replacements, including rhythm and repeat lab results as appropriate  - Fluid restriction as ordered  - Instruct patient on fluid and nutrition restrictions as appropriate  Outcome: Progressing  Goal: Hemodynamic stability and optimal renal function maintained  Description: INTERVENTIONS:  - Monitor labs and assess for signs and symptoms of volume excess or deficit  - Monitor intake, output and patient weight  - Monitor urine specific gravity, serum osmolarity and serum sodium as indicated or ordered  - Monitor response to interventions for patient's volume status, including labs, urine output, blood pressure (other measures as available)  - Encourage oral intake as appropriate  - Instruct patient on fluid and nutrition restrictions as appropriate  Outcome: Progressing

## 2022-05-27 NOTE — CM/SW NOTE
05/27/22 1500   CM/SW Referral Data   Referral Source Social Work (self-referral)   Reason for Referral Discharge planning   Informant Patient   Pertinent Medical Hx   Does patient have an established PCP? Yes   Patient Status Prior to Admission   Independent with ADLs and Mobility Yes   Discharge Needs   Anticipated D/C needs No anticipated discharge needs       Has good support. Lives alone. No needs at this time.

## 2022-05-27 NOTE — PLAN OF CARE
Problem: Patient Centered Care  Goal: Patient preferences are identified and integrated in the patient's plan of care  Description: Interventions:  - What would you like us to know as we care for you? From home Alone  - Provide timely, complete, and accurate information to patient/family  - Incorporate patient and family knowledge, values, beliefs, and cultural backgrounds into the planning and delivery of care  - Encourage patient/family to participate in care and decision-making at the level they choose  - Honor patient and family perspectives and choices  Outcome: Progressing     Problem: Patient/Family Goals  Goal: Patient/Family Long Term Goal  Description: Patient's Long Term Goal: Go home    Interventions:  - Ask questions about diagnosis  - Ask about new medications if needed  - F/U with PCP  - See additional Care Plan goals for specific interventions  Outcome: Progressing  Goal: Patient/Family Short Term Goal  Description: Patient's Short Term Goal: Breathe better    Interventions:   - IV Lasix  - 2D echo  - Cardiology  - See additional Care Plan goals for specific interventions  Outcome: Progressing     Problem: CARDIOVASCULAR - ADULT  Goal: Maintains optimal cardiac output and hemodynamic stability  Description: INTERVENTIONS:  - Monitor vital signs, rhythm, and trends  - Monitor for bleeding, hypotension and signs of decreased cardiac output  - Evaluate effectiveness of vasoactive medications to optimize hemodynamic stability  - Monitor arterial and/or venous puncture sites for bleeding and/or hematoma  - Assess quality of pulses, skin color and temperature  - Assess for signs of decreased coronary artery perfusion - ex.  Angina  - Evaluate fluid balance, assess for edema, trend weights  Outcome: Progressing  Goal: Absence of cardiac arrhythmias or at baseline  Description: INTERVENTIONS:  - Continuous cardiac monitoring, monitor vital signs, obtain 12 lead EKG if indicated  - Evaluate effectiveness of antiarrhythmic and heart rate control medications as ordered  - Initiate emergency measures for life threatening arrhythmias  - Monitor electrolytes and administer replacement therapy as ordered  Outcome: Progressing   A&Ox4, Successful PCI of the LAD with RO x1, diagonal with RO x1. Family at the bedside. R groin C/D/I. VSS. Call light within reach, will continue to monitor.

## 2022-05-28 VITALS
HEART RATE: 85 BPM | SYSTOLIC BLOOD PRESSURE: 140 MMHG | TEMPERATURE: 98 F | DIASTOLIC BLOOD PRESSURE: 59 MMHG | WEIGHT: 148.69 LBS | HEIGHT: 62 IN | RESPIRATION RATE: 16 BRPM | BODY MASS INDEX: 27.36 KG/M2 | OXYGEN SATURATION: 94 %

## 2022-05-28 LAB
ANION GAP SERPL CALC-SCNC: 8 MMOL/L (ref 0–18)
BUN BLD-MCNC: 35 MG/DL (ref 7–18)
BUN/CREAT SERPL: 18.9 (ref 10–20)
CALCIUM BLD-MCNC: 8.9 MG/DL (ref 8.5–10.1)
CHLORIDE SERPL-SCNC: 111 MMOL/L (ref 98–112)
CO2 SERPL-SCNC: 21 MMOL/L (ref 21–32)
CREAT BLD-MCNC: 1.85 MG/DL
DEPRECATED RDW RBC AUTO: 46.5 FL (ref 35.1–46.3)
ERYTHROCYTE [DISTWIDTH] IN BLOOD BY AUTOMATED COUNT: 13.8 % (ref 11–15)
GLUCOSE BLD-MCNC: 120 MG/DL (ref 70–99)
GLUCOSE BLDC GLUCOMTR-MCNC: 103 MG/DL (ref 70–99)
GLUCOSE BLDC GLUCOMTR-MCNC: 142 MG/DL (ref 70–99)
GLUCOSE BLDC GLUCOMTR-MCNC: 95 MG/DL (ref 70–99)
HCT VFR BLD AUTO: 31.5 %
HGB BLD-MCNC: 10 G/DL
MCH RBC QN AUTO: 29.1 PG (ref 26–34)
MCHC RBC AUTO-ENTMCNC: 31.7 G/DL (ref 31–37)
MCV RBC AUTO: 91.6 FL
OSMOLALITY SERPL CALC.SUM OF ELEC: 299 MOSM/KG (ref 275–295)
PLATELET # BLD AUTO: 329 10(3)UL (ref 150–450)
POTASSIUM SERPL-SCNC: 4.3 MMOL/L (ref 3.5–5.1)
POTASSIUM SERPL-SCNC: 4.3 MMOL/L (ref 3.5–5.1)
RBC # BLD AUTO: 3.44 X10(6)UL
SODIUM SERPL-SCNC: 140 MMOL/L (ref 136–145)
WBC # BLD AUTO: 9.2 X10(3) UL (ref 4–11)

## 2022-05-28 PROCEDURE — 99239 HOSP IP/OBS DSCHRG MGMT >30: CPT | Performed by: HOSPITALIST

## 2022-05-28 RX ORDER — FUROSEMIDE 20 MG/1
20 TABLET ORAL DAILY
Status: DISCONTINUED | OUTPATIENT
Start: 2022-05-29 | End: 2022-05-28

## 2022-05-28 RX ORDER — METOPROLOL SUCCINATE 50 MG/1
50 TABLET, EXTENDED RELEASE ORAL DAILY
Qty: 30 TABLET | Refills: 5 | Status: SHIPPED | OUTPATIENT
Start: 2022-05-28

## 2022-05-28 RX ORDER — ROSUVASTATIN CALCIUM 20 MG/1
20 TABLET, COATED ORAL NIGHTLY
Qty: 30 TABLET | Refills: 5 | Status: SHIPPED | OUTPATIENT
Start: 2022-05-28 | End: 2022-06-03

## 2022-05-28 RX ORDER — FUROSEMIDE 20 MG/1
20 TABLET ORAL EVERY OTHER DAY
Qty: 30 TABLET | Refills: 0 | Status: SHIPPED | OUTPATIENT
Start: 2022-05-29

## 2022-05-28 RX ORDER — ISOSORBIDE MONONITRATE 30 MG/1
30 TABLET, EXTENDED RELEASE ORAL DAILY
Qty: 30 TABLET | Refills: 2 | Status: SHIPPED | OUTPATIENT
Start: 2022-05-28

## 2022-05-28 RX ORDER — CLOPIDOGREL BISULFATE 75 MG/1
75 TABLET ORAL DAILY
Qty: 30 TABLET | Refills: 11 | Status: SHIPPED | OUTPATIENT
Start: 2022-05-28

## 2022-05-28 NOTE — PLAN OF CARE
Bhavin Stoll is alert and oriented. On RA. Voiding freely. No pain complaints overnight. IVF running per order until 9 am. Plan to monitor kidney function in the AM. Discharge home once medically cleared. Problem: Patient Centered Care  Goal: Patient preferences are identified and integrated in the patient's plan of care  Description: Interventions:  - What would you like us to know as we care for you? From home Alone  - Provide timely, complete, and accurate information to patient/family  - Incorporate patient and family knowledge, values, beliefs, and cultural backgrounds into the planning and delivery of care  - Encourage patient/family to participate in care and decision-making at the level they choose  - Honor patient and family perspectives and choices  Outcome: Progressing     Problem: Patient/Family Goals  Goal: Patient/Family Long Term Goal  Description: Patient's Long Term Goal: Go home    Interventions:  - Ask questions about diagnosis  - Ask about new medications if needed  - F/U with PCP  - See additional Care Plan goals for specific interventions  Outcome: Progressing  Goal: Patient/Family Short Term Goal  Description: Patient's Short Term Goal: Breathe better    Interventions:   - IV Lasix  - 2D echo  - Cardiology  - See additional Care Plan goals for specific interventions  Outcome: Progressing     Problem: CARDIOVASCULAR - ADULT  Goal: Maintains optimal cardiac output and hemodynamic stability  Description: INTERVENTIONS:  - Monitor vital signs, rhythm, and trends  - Monitor for bleeding, hypotension and signs of decreased cardiac output  - Evaluate effectiveness of vasoactive medications to optimize hemodynamic stability  - Monitor arterial and/or venous puncture sites for bleeding and/or hematoma  - Assess quality of pulses, skin color and temperature  - Assess for signs of decreased coronary artery perfusion - ex.  Angina  - Evaluate fluid balance, assess for edema, trend weights  Outcome: Progressing  Goal: Absence of cardiac arrhythmias or at baseline  Description: INTERVENTIONS:  - Continuous cardiac monitoring, monitor vital signs, obtain 12 lead EKG if indicated  - Evaluate effectiveness of antiarrhythmic and heart rate control medications as ordered  - Initiate emergency measures for life threatening arrhythmias  - Monitor electrolytes and administer replacement therapy as ordered  Outcome: Progressing     Problem: METABOLIC/FLUID AND ELECTROLYTES - ADULT  Goal: Glucose maintained within prescribed range  Description: INTERVENTIONS:  - Monitor Blood Glucose as ordered  - Assess for signs and symptoms of hyperglycemia and hypoglycemia  - Administer ordered medications to maintain glucose within target range  - Assess barriers to adequate nutritional intake and initiate nutrition consult as needed  - Instruct patient on self management of diabetes  Outcome: Progressing  Goal: Electrolytes maintained within normal limits  Description: INTERVENTIONS:  - Monitor labs and rhythm and assess patient for signs and symptoms of electrolyte imbalances  - Administer electrolyte replacement as ordered  - Monitor response to electrolyte replacements, including rhythm and repeat lab results as appropriate  - Fluid restriction as ordered  - Instruct patient on fluid and nutrition restrictions as appropriate  Outcome: Progressing  Goal: Hemodynamic stability and optimal renal function maintained  Description: INTERVENTIONS:  - Monitor labs and assess for signs and symptoms of volume excess or deficit  - Monitor intake, output and patient weight  - Monitor urine specific gravity, serum osmolarity and serum sodium as indicated or ordered  - Monitor response to interventions for patient's volume status, including labs, urine output, blood pressure (other measures as available)  - Encourage oral intake as appropriate  - Instruct patient on fluid and nutrition restrictions as appropriate  Outcome: Progressing

## 2022-05-28 NOTE — DIETARY NOTE
NUTRITION EDUCATION NOTE     Received consult for cardiac nutrition education. Family in room during education. Verbally reviewed basic cardiac diet restrictions. Provided with Eating Heart-Healthy and NCM handout to reinforce. Appropriate questions asked. Receptive to instruction. Would benefit from outpt f/u. Expect fair to good compliance.         [address], [address], [place], [place]  [id number]

## 2022-05-28 NOTE — PLAN OF CARE
Patient alert and oriented x4, on room air. Cath site CDI. No complaints, denies SOB or CP. Medically cleared for discharge. Will be going to private residence. Pt given discharge instructions at bedside regarding home medications and follow up appointments, pt verbalized understanding with no further questions. Tele, IV, and ID band removed. Pt brought down by wheelchair to main entrance and assisted into private vehicle with all belongings. Problem: Patient Centered Care  Goal: Patient preferences are identified and integrated in the patient's plan of care  Description: Interventions:  - What would you like us to know as we care for you?  From home Alone  - Provide timely, complete, and accurate information to patient/family  - Incorporate patient and family knowledge, values, beliefs, and cultural backgrounds into the planning and delivery of care  - Encourage patient/family to participate in care and decision-making at the level they choose  - Honor patient and family perspectives and choices  Outcome: Completed     Problem: Patient/Family Goals  Goal: Patient/Family Long Term Goal  Description: Patient's Long Term Goal: Go home    Interventions:  - Ask questions about diagnosis  - Ask about new medications if needed  - F/U with PCP  - See additional Care Plan goals for specific interventions  Outcome: Completed  Goal: Patient/Family Short Term Goal  Description: Patient's Short Term Goal: Breathe better    Interventions:   - IV Lasix  - 2D echo  - Cardiology  - See additional Care Plan goals for specific interventions  Outcome: Completed     Problem: CARDIOVASCULAR - ADULT  Goal: Maintains optimal cardiac output and hemodynamic stability  Description: INTERVENTIONS:  - Monitor vital signs, rhythm, and trends  - Monitor for bleeding, hypotension and signs of decreased cardiac output  - Evaluate effectiveness of vasoactive medications to optimize hemodynamic stability  - Monitor arterial and/or venous puncture sites for bleeding and/or hematoma  - Assess quality of pulses, skin color and temperature  - Assess for signs of decreased coronary artery perfusion - ex.  Angina  - Evaluate fluid balance, assess for edema, trend weights  Outcome: Completed  Goal: Absence of cardiac arrhythmias or at baseline  Description: INTERVENTIONS:  - Continuous cardiac monitoring, monitor vital signs, obtain 12 lead EKG if indicated  - Evaluate effectiveness of antiarrhythmic and heart rate control medications as ordered  - Initiate emergency measures for life threatening arrhythmias  - Monitor electrolytes and administer replacement therapy as ordered  Outcome: Completed     Problem: METABOLIC/FLUID AND ELECTROLYTES - ADULT  Goal: Glucose maintained within prescribed range  Description: INTERVENTIONS:  - Monitor Blood Glucose as ordered  - Assess for signs and symptoms of hyperglycemia and hypoglycemia  - Administer ordered medications to maintain glucose within target range  - Assess barriers to adequate nutritional intake and initiate nutrition consult as needed  - Instruct patient on self management of diabetes  Outcome: Completed  Goal: Electrolytes maintained within normal limits  Description: INTERVENTIONS:  - Monitor labs and rhythm and assess patient for signs and symptoms of electrolyte imbalances  - Administer electrolyte replacement as ordered  - Monitor response to electrolyte replacements, including rhythm and repeat lab results as appropriate  - Fluid restriction as ordered  - Instruct patient on fluid and nutrition restrictions as appropriate  Outcome: Completed  Goal: Hemodynamic stability and optimal renal function maintained  Description: INTERVENTIONS:  - Monitor labs and assess for signs and symptoms of volume excess or deficit  - Monitor intake, output and patient weight  - Monitor urine specific gravity, serum osmolarity and serum sodium as indicated or ordered  - Monitor response to interventions for patient's volume status, including labs, urine output, blood pressure (other measures as available)  - Encourage oral intake as appropriate  - Instruct patient on fluid and nutrition restrictions as appropriate  Outcome: Completed

## 2022-05-31 ENCOUNTER — PATIENT OUTREACH (OUTPATIENT)
Dept: CASE MANAGEMENT | Age: 77
End: 2022-05-31

## 2022-05-31 DIAGNOSIS — Z02.9 ENCOUNTERS FOR UNSPECIFIED ADMINISTRATIVE PURPOSE: ICD-10-CM

## 2022-05-31 PROCEDURE — 1111F DSCHRG MED/CURRENT MED MERGE: CPT

## 2022-06-03 ENCOUNTER — OFFICE VISIT (OUTPATIENT)
Dept: INTERNAL MEDICINE CLINIC | Facility: CLINIC | Age: 77
End: 2022-06-03
Payer: MEDICARE

## 2022-06-03 VITALS
SYSTOLIC BLOOD PRESSURE: 124 MMHG | WEIGHT: 145 LBS | DIASTOLIC BLOOD PRESSURE: 70 MMHG | BODY MASS INDEX: 26.68 KG/M2 | OXYGEN SATURATION: 98 % | HEIGHT: 62 IN | HEART RATE: 80 BPM | TEMPERATURE: 98 F

## 2022-06-03 DIAGNOSIS — R93.89 ABNORMAL CHEST X-RAY: ICD-10-CM

## 2022-06-03 DIAGNOSIS — Z95.5 STENTED CORONARY ARTERY: ICD-10-CM

## 2022-06-03 DIAGNOSIS — I50.9 NEW ONSET OF CONGESTIVE HEART FAILURE (HCC): Primary | ICD-10-CM

## 2022-06-03 DIAGNOSIS — I25.5 ISCHEMIC CARDIOMYOPATHY: ICD-10-CM

## 2022-06-03 DIAGNOSIS — E78.2 HYPERLIPIDEMIA, MIXED: ICD-10-CM

## 2022-06-03 DIAGNOSIS — E11.9 TYPE 2 DIABETES MELLITUS WITHOUT COMPLICATION, WITHOUT LONG-TERM CURRENT USE OF INSULIN (HCC): ICD-10-CM

## 2022-06-03 DIAGNOSIS — I10 ESSENTIAL HYPERTENSION: ICD-10-CM

## 2022-06-03 DIAGNOSIS — N18.4 CKD (CHRONIC KIDNEY DISEASE), STAGE IV (HCC): ICD-10-CM

## 2022-06-03 PROCEDURE — 1111F DSCHRG MED/CURRENT MED MERGE: CPT | Performed by: INTERNAL MEDICINE

## 2022-06-03 PROCEDURE — 99495 TRANSJ CARE MGMT MOD F2F 14D: CPT | Performed by: INTERNAL MEDICINE

## 2022-06-03 RX ORDER — INSULIN DETEMIR 100 [IU]/ML
30 INJECTION, SOLUTION SUBCUTANEOUS DAILY
Qty: 15 ML | Refills: 3 | Status: SHIPPED | OUTPATIENT
Start: 2022-06-03

## 2022-06-03 RX ORDER — ROSUVASTATIN CALCIUM 20 MG/1
20 TABLET, COATED ORAL NIGHTLY
Qty: 90 TABLET | Refills: 3 | Status: SHIPPED | OUTPATIENT
Start: 2022-06-03

## 2022-06-07 ENCOUNTER — APPOINTMENT (OUTPATIENT)
Dept: HEMATOLOGY/ONCOLOGY | Facility: HOSPITAL | Age: 77
End: 2022-06-07
Attending: INTERNAL MEDICINE
Payer: MEDICARE

## 2022-06-08 NOTE — PROGRESS NOTES
Multiple attempts to reach pt and messages left with no return call. Patient went in for HFU appt with PCP on 6/3/22. Encounter closing.

## 2022-06-10 ENCOUNTER — HOSPITAL ENCOUNTER (OUTPATIENT)
Dept: GENERAL RADIOLOGY | Facility: HOSPITAL | Age: 77
Discharge: HOME | End: 2022-06-10
Attending: INTERNAL MEDICINE
Payer: MEDICARE

## 2022-06-10 ENCOUNTER — TELEPHONE (OUTPATIENT)
Dept: INTERNAL MEDICINE CLINIC | Facility: CLINIC | Age: 77
End: 2022-06-10

## 2022-06-10 DIAGNOSIS — I50.9 NEW ONSET OF CONGESTIVE HEART FAILURE (HCC): ICD-10-CM

## 2022-06-10 DIAGNOSIS — R93.89 ABNORMAL CHEST X-RAY: ICD-10-CM

## 2022-06-10 PROCEDURE — 71046 X-RAY EXAM CHEST 2 VIEWS: CPT | Performed by: INTERNAL MEDICINE

## 2022-06-16 NOTE — PLAN OF CARE
Patient tolerating clear liquids, denies nausea. Pain managed at this time. [place] in place. SCDs on, will start heparin tonight. Continued on 2L NC, will wean as tolerated. Surgical incisions CDI. Bed alarm on, call light within reach.  Will advance diet as Assess for changes in respiratory status  - Assess for changes in mentation and behavior  - Position to facilitate oxygenation and minimize respiratory effort  - Oxygen supplementation based on oxygen saturation or ABGs  - Provide Smoking Cessation handout response  - Consider cultural and social influences on pain and pain management  - Manage/alleviate anxiety  - Utilize distraction and/or relaxation techniques  - Monitor for opioid side effects  - Notify MD/LIP if interventions unsuccessful or patient rep no

## 2022-06-24 RX ORDER — PEN NEEDLE, DIABETIC 31 GX5/16"
NEEDLE, DISPOSABLE MISCELLANEOUS
Qty: 300 EACH | Refills: 3 | Status: SHIPPED | OUTPATIENT
Start: 2022-06-24

## 2022-06-24 NOTE — TELEPHONE ENCOUNTER
[facility]he[facility] advises pt they do not have a prescription with refills on file for   BD Pen Needle Mini 31G X 5 MM

## 2022-07-01 ENCOUNTER — LAB ENCOUNTER (OUTPATIENT)
Dept: LAB | Age: 77
End: 2022-07-01
Attending: INTERNAL MEDICINE
Payer: MEDICARE

## 2022-07-01 DIAGNOSIS — E11.22 TYPE 2 DIABETES MELLITUS WITH DIABETIC CHRONIC KIDNEY DISEASE, UNSPECIFIED CKD STAGE, UNSPECIFIED WHETHER LONG TERM INSULIN USE (HCC): ICD-10-CM

## 2022-07-01 DIAGNOSIS — N18.4 HYPERTENSIVE KIDNEY DISEASE WITH CHRONIC KIDNEY DISEASE STAGE IV (HCC): ICD-10-CM

## 2022-07-01 DIAGNOSIS — Z90.5 ACQUIRED ABSENCE OF KIDNEY: ICD-10-CM

## 2022-07-01 DIAGNOSIS — N18.4 CHRONIC KIDNEY DISEASE, STAGE IV (SEVERE) (HCC): ICD-10-CM

## 2022-07-01 DIAGNOSIS — I12.9 HYPERTENSIVE KIDNEY DISEASE WITH CHRONIC KIDNEY DISEASE STAGE IV (HCC): ICD-10-CM

## 2022-07-01 DIAGNOSIS — C64.9 MALIGNANT NEOPLASM OF KIDNEY EXCLUDING RENAL PELVIS, UNSPECIFIED LATERALITY (HCC): ICD-10-CM

## 2022-07-01 LAB
ALBUMIN SERPL-MCNC: 3.9 G/DL (ref 3.4–5)
ANION GAP SERPL CALC-SCNC: 10 MMOL/L (ref 0–18)
BUN BLD-MCNC: 26 MG/DL (ref 7–18)
BUN/CREAT SERPL: 15.6 (ref 10–20)
CALCIUM BLD-MCNC: 8.8 MG/DL (ref 8.5–10.1)
CHLORIDE SERPL-SCNC: 113 MMOL/L (ref 98–112)
CO2 SERPL-SCNC: 21 MMOL/L (ref 21–32)
CREAT BLD-MCNC: 1.67 MG/DL
GLUCOSE BLD-MCNC: 134 MG/DL (ref 70–99)
OSMOLALITY SERPL CALC.SUM OF ELEC: 305 MOSM/KG (ref 275–295)
PHOSPHATE SERPL-MCNC: 4.1 MG/DL (ref 2.5–4.9)
POTASSIUM SERPL-SCNC: 4.4 MMOL/L (ref 3.5–5.1)
PTH-INTACT SERPL-MCNC: 162.6 PG/ML (ref 18.5–88)
SODIUM SERPL-SCNC: 144 MMOL/L (ref 136–145)

## 2022-07-01 PROCEDURE — 83970 ASSAY OF PARATHORMONE: CPT

## 2022-07-01 PROCEDURE — 36415 COLL VENOUS BLD VENIPUNCTURE: CPT

## 2022-07-01 PROCEDURE — 80069 RENAL FUNCTION PANEL: CPT

## 2022-07-11 ENCOUNTER — TELEPHONE (OUTPATIENT)
Dept: CARDIOLOGY CLINIC | Facility: HOSPITAL | Age: 77
End: 2022-07-11

## 2022-07-11 NOTE — TELEPHONE ENCOUNTER
Dr. Edilberto Newton sent a referral for a mutual patient of Dr. Alyssa Jama. She had a recent admission for CHF exacerbation. She was on Lasix and recently taken off by Dr. Alyssa Jama. Dr. Edilberto Newton put her back on lasix 20 mg M/W/F and would like [facility] to help manage.

## 2022-07-11 NOTE — TELEPHONE ENCOUNTER
Detailed message left (HIPAA verified) that we received a call from Dr. Batres Bolds office asking us to reach out to assist in scheduling appointment with one of the APNs at the [address] [place] [address]. [facility] and office hours provided.

## 2022-07-13 ENCOUNTER — LAB ENCOUNTER (OUTPATIENT)
Dept: LAB | Facility: HOSPITAL | Age: 77
End: 2022-07-13
Attending: NURSE PRACTITIONER
Payer: MEDICARE

## 2022-07-13 ENCOUNTER — OFFICE VISIT (OUTPATIENT)
Dept: CARDIOLOGY CLINIC | Facility: HOSPITAL | Age: 77
End: 2022-07-13
Attending: NURSE PRACTITIONER
Payer: MEDICARE

## 2022-07-13 VITALS
OXYGEN SATURATION: 98 % | HEART RATE: 68 BPM | DIASTOLIC BLOOD PRESSURE: 61 MMHG | WEIGHT: 145.31 LBS | SYSTOLIC BLOOD PRESSURE: 147 MMHG | BODY MASS INDEX: 27 KG/M2

## 2022-07-13 DIAGNOSIS — I25.10 CORONARY ARTERY DISEASE INVOLVING NATIVE CORONARY ARTERY OF NATIVE HEART WITHOUT ANGINA PECTORIS: ICD-10-CM

## 2022-07-13 DIAGNOSIS — Z95.5 STENTED CORONARY ARTERY: ICD-10-CM

## 2022-07-13 DIAGNOSIS — I25.5 ISCHEMIC CARDIOMYOPATHY: ICD-10-CM

## 2022-07-13 DIAGNOSIS — N18.32 STAGE 3B CHRONIC KIDNEY DISEASE (HCC): ICD-10-CM

## 2022-07-13 DIAGNOSIS — I50.9 ACUTE ON CHRONIC CONGESTIVE HEART FAILURE, UNSPECIFIED HEART FAILURE TYPE (HCC): Primary | ICD-10-CM

## 2022-07-13 PROBLEM — I50.43 ACUTE ON CHRONIC COMBINED SYSTOLIC AND DIASTOLIC HF (HEART FAILURE), NYHA CLASS 3 (HCC): Chronic | Status: ACTIVE | Noted: 2022-07-13

## 2022-07-13 LAB
ANION GAP SERPL CALC-SCNC: 8 MMOL/L (ref 0–18)
BUN BLD-MCNC: 33 MG/DL (ref 7–18)
BUN/CREAT SERPL: 18.8 (ref 10–20)
CALCIUM BLD-MCNC: 8.9 MG/DL (ref 8.5–10.1)
CHLORIDE SERPL-SCNC: 112 MMOL/L (ref 98–112)
CO2 SERPL-SCNC: 22 MMOL/L (ref 21–32)
CREAT BLD-MCNC: 1.76 MG/DL
FASTING STATUS PATIENT QL REPORTED: YES
GLUCOSE BLD-MCNC: 140 MG/DL (ref 70–99)
NT-PROBNP SERPL-MCNC: 9266 PG/ML (ref ?–450)
OSMOLALITY SERPL CALC.SUM OF ELEC: 304 MOSM/KG (ref 275–295)
POTASSIUM SERPL-SCNC: 4.1 MMOL/L (ref 3.5–5.1)
SODIUM SERPL-SCNC: 142 MMOL/L (ref 136–145)

## 2022-07-13 PROCEDURE — 99215 OFFICE O/P EST HI 40 MIN: CPT | Performed by: NURSE PRACTITIONER

## 2022-07-13 RX ORDER — ISOSORBIDE MONONITRATE 30 MG/1
30 TABLET, EXTENDED RELEASE ORAL DAILY
Qty: 90 TABLET | Refills: 3 | Status: SHIPPED | OUTPATIENT
Start: 2022-07-13

## 2022-07-13 RX ORDER — FUROSEMIDE 20 MG/1
20 TABLET ORAL
Qty: 39 TABLET | Refills: 2 | Status: SHIPPED | OUTPATIENT
Start: 2022-07-13

## 2022-07-13 RX ORDER — METOPROLOL SUCCINATE 50 MG/1
50 TABLET, EXTENDED RELEASE ORAL 2 TIMES DAILY
Qty: 180 TABLET | Refills: 3 | Status: SHIPPED | OUTPATIENT
Start: 2022-07-13

## 2022-07-13 NOTE — PATIENT INSTRUCTIONS
Increase metoprolol succinate to 50 mg one tab twice daily     Continue all your same medications    Call if having any dizziness, lightheadedness, heart racing, palpitations, chest pain, shortness of breath, coughing,  wheezing, swelling, weight gain,  or weakness    Call 911 with severe shortness of breath, chest pain or chest pressure not improved after 15 minutes of rest or if feeling faint,  passing out or having a fall     Weigh yourself daily in the morning before breakfast, call if gaining 3 lbs or more overnight or more than 5 lbs in one week. Follow 2000 mg sodium restricted , heart healthy diet, Keep daily fluids at 48-64 ounces per day    Echocardiogram on 8/3/22     Follow up with Dr. Lorena Rebollar on August 8/17/22     Follow up with the specialty care clinic on 7/28/22    Consider cardiac rehab at [facility] , call [phone] to make an orientation appointment         Limit sodium to  2000 mg daily. Common high sodium foods include frozen dinners, soups (not homemade), some cereal, vegetable juice, canned vegetables, lunch meats, processed meats like hotdogs, sausage, banda, pepperoni, soy sauce, pre-packaged rice or potatoes. Please remember to read nutrition labels for sodium content.     www.healthyeating. nhlbi.nih.gov      Exercise daily as tolerated, with goal of doing moderate aerobic exercise like walking for about 30 minutes 5 days per week. Start by walking at a slow to moderate pace for 5-10 minutes 2-3 times a day. Pace your activity to prevent shortness of breath or fatigue.  Stop exercise if you develop chest pain, lightheadedness, or significant shortness of breath

## 2022-07-13 NOTE — PROGRESS NOTES
Paola's weight today 145.3 lb (141 lb at home). Weight 6/3: 145 and 5/25: 154 but reports having changing diet since discharge cutting back on pasta, breads, salts, beef. Has knee pain with walking, thus cane use. Denies lightheadedness, shortness of breath, palpitations, chest pain or swelling. Her complaint was a feeling of \"emptiness\" when taking a breath, then when put back on diuretic Monday, Wed, Friday sensation went away.

## 2022-07-15 ENCOUNTER — TELEPHONE (OUTPATIENT)
Dept: INTERNAL MEDICINE CLINIC | Facility: CLINIC | Age: 77
End: 2022-07-15

## 2022-07-15 NOTE — TELEPHONE ENCOUNTER
----- Message from John Hyman NP sent at 7/14/2022  5:16 PM CDT -----  Regarding: Question if patient needs labs before seeing Dr. Cris Edouard saw Josh Quinn in the specialty care/heart failure clinic yesterday. She has an appointment to see Dr. Diane Hitnon on August 1 and thought she had some blood work to be done prior to the visit. I ordered a BMP to be done on July 27. Can you check with Dr. Diane Hinton and contact the patient if she needs to have blood work prior to the office visit?   Thank you,  [place]

## 2022-07-19 NOTE — TELEPHONE ENCOUNTER
To nursing. pls tell pt-- [place] GUPTA messaged us she will do next lab (BMP) on 7/27/22. Can do AST ALT from my 6/3/22 orders then. Pt will need to point this out to the lab so they take off both of our orders. Thanks. Note to self--no other lab orders needed. Dr Nas Martinez did lipid panel 5/27/22. A1c 6.3 on 5/3/22--too early to repeat. Pt has next appt with me 8/1/22.

## 2022-07-21 RX ORDER — FUROSEMIDE 20 MG/1
TABLET ORAL
Qty: 30 TABLET | Refills: 0 | OUTPATIENT
Start: 2022-07-21

## 2022-07-27 ENCOUNTER — TELEPHONE (OUTPATIENT)
Dept: CARDIOLOGY CLINIC | Facility: HOSPITAL | Age: 77
End: 2022-07-27

## 2022-07-27 DIAGNOSIS — I50.9 ACUTE ON CHRONIC CONGESTIVE HEART FAILURE, UNSPECIFIED HEART FAILURE TYPE (HCC): Primary | ICD-10-CM

## 2022-07-27 NOTE — TELEPHONE ENCOUNTER
Appointment reminder given and requested to come 30 minutes early for lab draw. Lab order entered and scheduled.

## 2022-07-28 ENCOUNTER — TELEPHONE (OUTPATIENT)
Dept: CARDIOLOGY CLINIC | Facility: HOSPITAL | Age: 77
End: 2022-07-28

## 2022-07-28 ENCOUNTER — LAB ENCOUNTER (OUTPATIENT)
Dept: LAB | Facility: HOSPITAL | Age: 77
End: 2022-07-28
Attending: NURSE PRACTITIONER
Payer: MEDICARE

## 2022-07-28 ENCOUNTER — OFFICE VISIT (OUTPATIENT)
Dept: CARDIOLOGY CLINIC | Facility: HOSPITAL | Age: 77
End: 2022-07-28
Attending: NURSE PRACTITIONER
Payer: MEDICARE

## 2022-07-28 VITALS
BODY MASS INDEX: 27 KG/M2 | RESPIRATION RATE: 16 BRPM | OXYGEN SATURATION: 97 % | DIASTOLIC BLOOD PRESSURE: 67 MMHG | WEIGHT: 146.19 LBS | SYSTOLIC BLOOD PRESSURE: 158 MMHG | HEART RATE: 60 BPM

## 2022-07-28 DIAGNOSIS — I50.42 CHRONIC COMBINED SYSTOLIC AND DIASTOLIC CHF, NYHA CLASS 2 (HCC): Primary | Chronic | ICD-10-CM

## 2022-07-28 DIAGNOSIS — I50.9 ACUTE ON CHRONIC CONGESTIVE HEART FAILURE, UNSPECIFIED HEART FAILURE TYPE (HCC): ICD-10-CM

## 2022-07-28 DIAGNOSIS — N18.32 STAGE 3B CHRONIC KIDNEY DISEASE (HCC): ICD-10-CM

## 2022-07-28 DIAGNOSIS — Z95.5 STENTED CORONARY ARTERY: ICD-10-CM

## 2022-07-28 DIAGNOSIS — I10 ESSENTIAL HYPERTENSION: ICD-10-CM

## 2022-07-28 DIAGNOSIS — E78.2 HYPERLIPIDEMIA, MIXED: ICD-10-CM

## 2022-07-28 DIAGNOSIS — I25.10 CORONARY ARTERY DISEASE INVOLVING NATIVE CORONARY ARTERY OF NATIVE HEART WITHOUT ANGINA PECTORIS: Chronic | ICD-10-CM

## 2022-07-28 LAB
ALT SERPL-CCNC: 22 U/L
ANION GAP SERPL CALC-SCNC: 11 MMOL/L (ref 0–18)
AST SERPL-CCNC: 18 U/L (ref 15–37)
BUN BLD-MCNC: 37 MG/DL (ref 7–18)
BUN/CREAT SERPL: 23 (ref 10–20)
CALCIUM BLD-MCNC: 8.8 MG/DL (ref 8.5–10.1)
CHLORIDE SERPL-SCNC: 110 MMOL/L (ref 98–112)
CO2 SERPL-SCNC: 21 MMOL/L (ref 21–32)
CREAT BLD-MCNC: 1.61 MG/DL
FASTING STATUS PATIENT QL REPORTED: YES
GLUCOSE BLD-MCNC: 158 MG/DL (ref 70–99)
NT-PROBNP SERPL-MCNC: 9895 PG/ML (ref ?–450)
OSMOLALITY SERPL CALC.SUM OF ELEC: 306 MOSM/KG (ref 275–295)
POTASSIUM SERPL-SCNC: 3.9 MMOL/L (ref 3.5–5.1)
SODIUM SERPL-SCNC: 142 MMOL/L (ref 136–145)

## 2022-07-28 PROCEDURE — 80048 BASIC METABOLIC PNL TOTAL CA: CPT

## 2022-07-28 PROCEDURE — 83880 ASSAY OF NATRIURETIC PEPTIDE: CPT

## 2022-07-28 PROCEDURE — 84450 TRANSFERASE (AST) (SGOT): CPT

## 2022-07-28 PROCEDURE — 36415 COLL VENOUS BLD VENIPUNCTURE: CPT

## 2022-07-28 PROCEDURE — 99215 OFFICE O/P EST HI 40 MIN: CPT | Performed by: NURSE PRACTITIONER

## 2022-07-28 PROCEDURE — 84460 ALANINE AMINO (ALT) (SGPT): CPT

## 2022-07-28 RX ORDER — HYDRALAZINE HYDROCHLORIDE 50 MG/1
50 TABLET, FILM COATED ORAL 3 TIMES DAILY
Qty: 270 TABLET | Refills: 3 | Status: SHIPPED | OUTPATIENT
Start: 2022-07-28

## 2022-07-28 NOTE — PATIENT INSTRUCTIONS
Increase hydralazine to 50 mg one tab three times a day     Continue all your same medications    Call if having any dizziness, lightheadedness, heart racing, palpitations, chest pain, shortness of breath, coughing,  wheezing, swelling, weight gain,  or weakness    Call 911 with severe shortness of breath, chest pain or chest pressure not improved after 15 minutes of rest or if feeling faint,  passing out or having a fall     Weigh yourself daily in the morning before breakfast, call if gaining 3 lbs or more overnight or more than 5 lbs in one week. Follow 2000 mg sodium restricted , heart healthy diet, Keep daily fluids at 48-64 ounces per day    Follow up with Dr. Malou Wadsworth on 8/1/22    Follow up with Dr. Annette Colmenares on 8/17/22 with echo prior to visit    Follow up with the specialty care clinic in 3 months         Limit sodium to  2000 mg daily. Common high sodium foods include frozen dinners, soups (not homemade), some cereal, vegetable juice, canned vegetables, lunch meats, processed meats like hotdogs, sausage, banda, pepperoni, soy sauce, pre-packaged rice or potatoes. Please remember to read nutrition labels for sodium content.     www.healthyeating. nhlbi.nih.gov      Exercise daily as tolerated, with goal of doing moderate aerobic exercise like walking for about 30 minutes 5 days per week. Start by walking at a slow to moderate pace for 5-10 minutes 2-3 times a day. Pace your activity to prevent shortness of breath or fatigue.  Stop exercise if you develop chest pain, lightheadedness, or significant shortness of breath

## 2022-07-28 NOTE — PROGRESS NOTES
Here with no c/o at this time. States she has been feeling good and started doing chair exercises. Uses a cane.

## 2022-07-28 NOTE — TELEPHONE ENCOUNTER
Call at end of August to schedule f/u for October. She will be starting cardiac rehab to schedule around.

## 2022-08-01 ENCOUNTER — OFFICE VISIT (OUTPATIENT)
Dept: INTERNAL MEDICINE CLINIC | Facility: CLINIC | Age: 77
End: 2022-08-01
Payer: MEDICARE

## 2022-08-01 VITALS
DIASTOLIC BLOOD PRESSURE: 76 MMHG | HEART RATE: 59 BPM | OXYGEN SATURATION: 98 % | HEIGHT: 62 IN | BODY MASS INDEX: 27.05 KG/M2 | SYSTOLIC BLOOD PRESSURE: 168 MMHG | WEIGHT: 147 LBS | TEMPERATURE: 97 F

## 2022-08-01 DIAGNOSIS — E11.9 TYPE 2 DIABETES MELLITUS WITHOUT COMPLICATION, WITHOUT LONG-TERM CURRENT USE OF INSULIN (HCC): ICD-10-CM

## 2022-08-01 DIAGNOSIS — Z95.5 STENTED CORONARY ARTERY: ICD-10-CM

## 2022-08-01 DIAGNOSIS — Z63.4 BEREAVEMENT: ICD-10-CM

## 2022-08-01 DIAGNOSIS — N18.4 CKD (CHRONIC KIDNEY DISEASE), STAGE IV (HCC): ICD-10-CM

## 2022-08-01 DIAGNOSIS — I10 ESSENTIAL HYPERTENSION: ICD-10-CM

## 2022-08-01 DIAGNOSIS — I25.5 ISCHEMIC CARDIOMYOPATHY: Primary | ICD-10-CM

## 2022-08-01 PROCEDURE — 99215 OFFICE O/P EST HI 40 MIN: CPT | Performed by: INTERNAL MEDICINE

## 2022-08-01 RX ORDER — ISOSORBIDE DINITRATE 30 MG/1
1 TABLET ORAL DAILY
COMMUNITY
Start: 2022-07-08

## 2022-08-01 RX ORDER — INSULIN DETEMIR 100 [IU]/ML
20 INJECTION, SOLUTION SUBCUTANEOUS DAILY
Qty: 15 ML | Refills: 3 | COMMUNITY
Start: 2022-08-01

## 2022-08-01 RX ORDER — INSULIN DETEMIR 100 [IU]/ML
28 INJECTION, SOLUTION SUBCUTANEOUS DAILY
Qty: 15 ML | Refills: 3 | COMMUNITY
Start: 2022-08-01 | End: 2022-08-01

## 2022-08-01 RX ORDER — AMLODIPINE BESYLATE 5 MG/1
5 TABLET ORAL DAILY
Qty: 1 TABLET | Refills: 0 | COMMUNITY
Start: 2022-08-01

## 2022-08-01 RX ORDER — AMLODIPINE BESYLATE 10 MG/1
1 TABLET ORAL DAILY
COMMUNITY
Start: 2022-06-08 | End: 2022-08-01

## 2022-08-01 SDOH — SOCIAL STABILITY - SOCIAL INSECURITY: DISSAPEARANCE AND DEATH OF FAMILY MEMBER: Z63.4

## 2022-08-15 ENCOUNTER — CARDPULM VISIT (OUTPATIENT)
Dept: CARDIAC REHAB | Facility: HOSPITAL | Age: 77
End: 2022-08-15
Attending: INTERNAL MEDICINE
Payer: MEDICARE

## 2022-08-15 ENCOUNTER — ORDER TRANSCRIPTION (OUTPATIENT)
Dept: CARDIAC REHAB | Facility: HOSPITAL | Age: 77
End: 2022-08-15

## 2022-08-15 DIAGNOSIS — Z98.61 S/P PTCA (PERCUTANEOUS TRANSLUMINAL CORONARY ANGIOPLASTY): Primary | ICD-10-CM

## 2022-08-23 ENCOUNTER — CARDPULM VISIT (OUTPATIENT)
Dept: CARDIAC REHAB | Facility: HOSPITAL | Age: 77
End: 2022-08-23
Attending: INTERNAL MEDICINE
Payer: MEDICARE

## 2022-08-23 LAB — GLUCOSE BLDC GLUCOMTR-MCNC: 170 MG/DL (ref 70–99)

## 2022-08-23 PROCEDURE — 93798 PHYS/QHP OP CAR RHAB W/ECG: CPT

## 2022-08-25 ENCOUNTER — CARDPULM VISIT (OUTPATIENT)
Dept: CARDIAC REHAB | Facility: HOSPITAL | Age: 77
End: 2022-08-25
Attending: INTERNAL MEDICINE
Payer: MEDICARE

## 2022-08-25 ENCOUNTER — LAB ENCOUNTER (OUTPATIENT)
Dept: LAB | Facility: HOSPITAL | Age: 77
End: 2022-08-25
Attending: NURSE PRACTITIONER
Payer: MEDICARE

## 2022-08-25 ENCOUNTER — TELEPHONE (OUTPATIENT)
Dept: INTERNAL MEDICINE CLINIC | Facility: CLINIC | Age: 77
End: 2022-08-25

## 2022-08-25 DIAGNOSIS — R06.09 DYSPNEA ON EXERTION: ICD-10-CM

## 2022-08-25 DIAGNOSIS — N18.30 CHRONIC KIDNEY DISEASE, STAGE III (MODERATE) (HCC): ICD-10-CM

## 2022-08-25 DIAGNOSIS — I50.9 HEART FAILURE, UNSPECIFIED (HCC): Primary | ICD-10-CM

## 2022-08-25 LAB
ANION GAP SERPL CALC-SCNC: 6 MMOL/L (ref 0–18)
BASOPHILS # BLD AUTO: 0.05 X10(3) UL (ref 0–0.2)
BASOPHILS NFR BLD AUTO: 0.6 %
BUN BLD-MCNC: 44 MG/DL (ref 7–18)
BUN/CREAT SERPL: 24.6 (ref 10–20)
CALCIUM BLD-MCNC: 9.2 MG/DL (ref 8.5–10.1)
CHLORIDE SERPL-SCNC: 110 MMOL/L (ref 98–112)
CO2 SERPL-SCNC: 25 MMOL/L (ref 21–32)
CREAT BLD-MCNC: 1.79 MG/DL
DEPRECATED RDW RBC AUTO: 47.7 FL (ref 35.1–46.3)
EOSINOPHIL # BLD AUTO: 0.29 X10(3) UL (ref 0–0.7)
EOSINOPHIL NFR BLD AUTO: 3.3 %
ERYTHROCYTE [DISTWIDTH] IN BLOOD BY AUTOMATED COUNT: 14.1 % (ref 11–15)
FASTING STATUS PATIENT QL REPORTED: YES
GFR SERPLBLD BASED ON 1.73 SQ M-ARVRAT: 29 ML/MIN/1.73M2 (ref 60–?)
GLUCOSE BLD-MCNC: 105 MG/DL (ref 70–99)
HCT VFR BLD AUTO: 42.2 %
HGB BLD-MCNC: 12.8 G/DL
IMM GRANULOCYTES # BLD AUTO: 0.02 X10(3) UL (ref 0–1)
IMM GRANULOCYTES NFR BLD: 0.2 %
LYMPHOCYTES # BLD AUTO: 1.13 X10(3) UL (ref 1–4)
LYMPHOCYTES NFR BLD AUTO: 12.7 %
MCH RBC QN AUTO: 27.8 PG (ref 26–34)
MCHC RBC AUTO-ENTMCNC: 30.3 G/DL (ref 31–37)
MCV RBC AUTO: 91.7 FL
MONOCYTES # BLD AUTO: 0.75 X10(3) UL (ref 0.1–1)
MONOCYTES NFR BLD AUTO: 8.4 %
NEUTROPHILS # BLD AUTO: 6.66 X10 (3) UL (ref 1.5–7.7)
NEUTROPHILS # BLD AUTO: 6.66 X10(3) UL (ref 1.5–7.7)
NEUTROPHILS NFR BLD AUTO: 74.8 %
NT-PROBNP SERPL-MCNC: 2601 PG/ML (ref ?–450)
OSMOLALITY SERPL CALC.SUM OF ELEC: 304 MOSM/KG (ref 275–295)
PLATELET # BLD AUTO: 324 10(3)UL (ref 150–450)
POTASSIUM SERPL-SCNC: 5.6 MMOL/L (ref 3.5–5.1)
RBC # BLD AUTO: 4.6 X10(6)UL
SODIUM SERPL-SCNC: 141 MMOL/L (ref 136–145)
WBC # BLD AUTO: 8.9 X10(3) UL (ref 4–11)

## 2022-08-25 PROCEDURE — 36415 COLL VENOUS BLD VENIPUNCTURE: CPT

## 2022-08-25 PROCEDURE — 85025 COMPLETE CBC W/AUTO DIFF WBC: CPT

## 2022-08-25 PROCEDURE — 83880 ASSAY OF NATRIURETIC PEPTIDE: CPT

## 2022-08-25 PROCEDURE — 80048 BASIC METABOLIC PNL TOTAL CA: CPT

## 2022-08-25 PROCEDURE — 93798 PHYS/QHP OP CAR RHAB W/ECG: CPT

## 2022-08-25 RX ORDER — SACUBITRIL AND VALSARTAN 24; 26 MG/1; MG/1
1 TABLET, FILM COATED ORAL 2 TIMES DAILY
Refills: 0 | COMMUNITY
Start: 2022-08-25

## 2022-08-25 NOTE — TELEPHONE ENCOUNTER
K+ 5.6; creatinine 1.79 on 8/25/22; previous creatinine 1.61 on 7/28/22;   Started Entresto 24/26 po BID on 8/5/22 by Dr Cathi Rowell    D/w RN Jayme Mo at [facility]; she will investigate and decide on changes and when to re-check Kenan Velasquez

## 2022-08-25 NOTE — TELEPHONE ENCOUNTER
Dr. Susi Allred, please review lab results for Dr. Renita Daniel. These were ordered by Cherelle GUPTA.

## 2022-08-30 ENCOUNTER — TELEPHONE (OUTPATIENT)
Dept: INTERNAL MEDICINE CLINIC | Facility: CLINIC | Age: 77
End: 2022-08-30

## 2022-08-30 ENCOUNTER — CARDPULM VISIT (OUTPATIENT)
Dept: CARDIAC REHAB | Facility: HOSPITAL | Age: 77
End: 2022-08-30
Attending: INTERNAL MEDICINE
Payer: MEDICARE

## 2022-08-30 ENCOUNTER — LAB ENCOUNTER (OUTPATIENT)
Dept: LAB | Facility: HOSPITAL | Age: 77
End: 2022-08-30
Attending: NURSE PRACTITIONER
Payer: MEDICARE

## 2022-08-30 DIAGNOSIS — I10 ESSENTIAL HYPERTENSION: ICD-10-CM

## 2022-08-30 DIAGNOSIS — N18.4 CKD (CHRONIC KIDNEY DISEASE), STAGE IV (HCC): ICD-10-CM

## 2022-08-30 LAB
ANION GAP SERPL CALC-SCNC: 9 MMOL/L (ref 0–18)
BUN BLD-MCNC: 40 MG/DL (ref 7–18)
BUN/CREAT SERPL: 24.5 (ref 10–20)
CALCIUM BLD-MCNC: 9 MG/DL (ref 8.5–10.1)
CHLORIDE SERPL-SCNC: 110 MMOL/L (ref 98–112)
CO2 SERPL-SCNC: 22 MMOL/L (ref 21–32)
CREAT BLD-MCNC: 1.63 MG/DL
FASTING STATUS PATIENT QL REPORTED: YES
GFR SERPLBLD BASED ON 1.73 SQ M-ARVRAT: 32 ML/MIN/1.73M2 (ref 60–?)
GLUCOSE BLD-MCNC: 130 MG/DL (ref 70–99)
OSMOLALITY SERPL CALC.SUM OF ELEC: 304 MOSM/KG (ref 275–295)
POTASSIUM SERPL-SCNC: 4.3 MMOL/L (ref 3.5–5.1)
SODIUM SERPL-SCNC: 141 MMOL/L (ref 136–145)

## 2022-08-30 PROCEDURE — 36415 COLL VENOUS BLD VENIPUNCTURE: CPT

## 2022-08-30 PROCEDURE — 80048 BASIC METABOLIC PNL TOTAL CA: CPT

## 2022-08-30 PROCEDURE — 93798 PHYS/QHP OP CAR RHAB W/ECG: CPT

## 2022-09-01 ENCOUNTER — CARDPULM VISIT (OUTPATIENT)
Dept: CARDIAC REHAB | Facility: HOSPITAL | Age: 77
End: 2022-09-01
Attending: INTERNAL MEDICINE
Payer: MEDICARE

## 2022-09-01 PROCEDURE — 93798 PHYS/QHP OP CAR RHAB W/ECG: CPT

## 2022-09-06 ENCOUNTER — CARDPULM VISIT (OUTPATIENT)
Dept: CARDIAC REHAB | Facility: HOSPITAL | Age: 77
End: 2022-09-06
Attending: INTERNAL MEDICINE
Payer: MEDICARE

## 2022-09-06 PROCEDURE — 93798 PHYS/QHP OP CAR RHAB W/ECG: CPT

## 2022-09-08 ENCOUNTER — CARDPULM VISIT (OUTPATIENT)
Dept: CARDIAC REHAB | Facility: HOSPITAL | Age: 77
End: 2022-09-08
Attending: INTERNAL MEDICINE
Payer: MEDICARE

## 2022-09-08 PROCEDURE — 93798 PHYS/QHP OP CAR RHAB W/ECG: CPT

## 2022-09-09 ENCOUNTER — TELEPHONE (OUTPATIENT)
Dept: INTERNAL MEDICINE CLINIC | Facility: CLINIC | Age: 77
End: 2022-09-09

## 2022-09-09 NOTE — TELEPHONE ENCOUNTER
Please call [facility]  regarding:  Hydralazine   Is patient currently taking 50MG or 25MG?   Please call at [phone] option 2  Reference#:  [id number]  Order on hold until call back rec'd  This message was left on voicemail  Tasked to RX

## 2022-09-12 ENCOUNTER — CARDPULM VISIT (OUTPATIENT)
Dept: CARDIAC REHAB | Facility: HOSPITAL | Age: 77
End: 2022-09-12
Attending: INTERNAL MEDICINE
Payer: MEDICARE

## 2022-09-12 PROCEDURE — 93798 PHYS/QHP OP CAR RHAB W/ECG: CPT

## 2022-09-13 ENCOUNTER — APPOINTMENT (OUTPATIENT)
Dept: CARDIAC REHAB | Facility: HOSPITAL | Age: 77
End: 2022-09-13
Attending: INTERNAL MEDICINE
Payer: MEDICARE

## 2022-09-15 ENCOUNTER — CARDPULM VISIT (OUTPATIENT)
Dept: CARDIAC REHAB | Facility: HOSPITAL | Age: 77
End: 2022-09-15
Attending: INTERNAL MEDICINE
Payer: MEDICARE

## 2022-09-15 ENCOUNTER — LAB ENCOUNTER (OUTPATIENT)
Dept: LAB | Facility: HOSPITAL | Age: 77
End: 2022-09-15
Attending: INTERNAL MEDICINE
Payer: MEDICARE

## 2022-09-15 DIAGNOSIS — E11.9 TYPE 2 DIABETES MELLITUS WITHOUT COMPLICATION, WITHOUT LONG-TERM CURRENT USE OF INSULIN (HCC): ICD-10-CM

## 2022-09-15 LAB
EST. AVERAGE GLUCOSE BLD GHB EST-MCNC: 140 MG/DL (ref 68–126)
HBA1C MFR BLD: 6.5 % (ref ?–5.7)

## 2022-09-15 PROCEDURE — 83036 HEMOGLOBIN GLYCOSYLATED A1C: CPT

## 2022-09-15 PROCEDURE — 36415 COLL VENOUS BLD VENIPUNCTURE: CPT

## 2022-09-15 PROCEDURE — 93798 PHYS/QHP OP CAR RHAB W/ECG: CPT

## 2022-09-20 ENCOUNTER — OFFICE VISIT (OUTPATIENT)
Dept: INTERNAL MEDICINE CLINIC | Facility: CLINIC | Age: 77
End: 2022-09-20

## 2022-09-20 ENCOUNTER — CARDPULM VISIT (OUTPATIENT)
Dept: CARDIAC REHAB | Facility: HOSPITAL | Age: 77
End: 2022-09-20
Attending: INTERNAL MEDICINE
Payer: MEDICARE

## 2022-09-20 VITALS
DIASTOLIC BLOOD PRESSURE: 70 MMHG | HEART RATE: 56 BPM | HEIGHT: 62 IN | SYSTOLIC BLOOD PRESSURE: 136 MMHG | WEIGHT: 144 LBS | OXYGEN SATURATION: 98 % | BODY MASS INDEX: 26.5 KG/M2 | TEMPERATURE: 98 F

## 2022-09-20 DIAGNOSIS — I25.5 ISCHEMIC CARDIOMYOPATHY: Primary | ICD-10-CM

## 2022-09-20 DIAGNOSIS — N18.4 CKD (CHRONIC KIDNEY DISEASE), STAGE IV (HCC): ICD-10-CM

## 2022-09-20 DIAGNOSIS — Z95.5 STENTED CORONARY ARTERY: ICD-10-CM

## 2022-09-20 DIAGNOSIS — E11.9 TYPE 2 DIABETES MELLITUS WITHOUT COMPLICATION, WITHOUT LONG-TERM CURRENT USE OF INSULIN (HCC): ICD-10-CM

## 2022-09-20 DIAGNOSIS — I10 ESSENTIAL HYPERTENSION: ICD-10-CM

## 2022-09-20 PROCEDURE — 99214 OFFICE O/P EST MOD 30 MIN: CPT | Performed by: INTERNAL MEDICINE

## 2022-09-20 PROCEDURE — 93798 PHYS/QHP OP CAR RHAB W/ECG: CPT

## 2022-09-20 RX ORDER — FUROSEMIDE 20 MG/1
20 TABLET ORAL
Qty: 39 TABLET | Refills: 2 | COMMUNITY
Start: 2022-09-21

## 2022-09-22 ENCOUNTER — CARDPULM VISIT (OUTPATIENT)
Dept: CARDIAC REHAB | Facility: HOSPITAL | Age: 77
End: 2022-09-22
Attending: INTERNAL MEDICINE
Payer: MEDICARE

## 2022-09-22 PROCEDURE — 93798 PHYS/QHP OP CAR RHAB W/ECG: CPT

## 2022-09-27 ENCOUNTER — CARDPULM VISIT (OUTPATIENT)
Dept: CARDIAC REHAB | Facility: HOSPITAL | Age: 77
End: 2022-09-27
Attending: INTERNAL MEDICINE
Payer: MEDICARE

## 2022-09-27 PROCEDURE — 93798 PHYS/QHP OP CAR RHAB W/ECG: CPT

## 2022-09-29 ENCOUNTER — CARDPULM VISIT (OUTPATIENT)
Dept: CARDIAC REHAB | Facility: HOSPITAL | Age: 77
End: 2022-09-29
Attending: INTERNAL MEDICINE
Payer: MEDICARE

## 2022-09-29 PROCEDURE — 93798 PHYS/QHP OP CAR RHAB W/ECG: CPT

## 2022-10-04 ENCOUNTER — CARDPULM VISIT (OUTPATIENT)
Dept: CARDIAC REHAB | Facility: HOSPITAL | Age: 77
End: 2022-10-04
Attending: INTERNAL MEDICINE
Payer: MEDICARE

## 2022-10-04 PROCEDURE — 93798 PHYS/QHP OP CAR RHAB W/ECG: CPT

## 2022-10-05 DIAGNOSIS — I50.9 ACUTE ON CHRONIC CONGESTIVE HEART FAILURE, UNSPECIFIED HEART FAILURE TYPE (HCC): Primary | ICD-10-CM

## 2022-10-06 ENCOUNTER — LAB ENCOUNTER (OUTPATIENT)
Dept: LAB | Facility: HOSPITAL | Age: 77
End: 2022-10-06
Attending: NURSE PRACTITIONER
Payer: MEDICARE

## 2022-10-06 ENCOUNTER — CARDPULM VISIT (OUTPATIENT)
Dept: CARDIAC REHAB | Facility: HOSPITAL | Age: 77
End: 2022-10-06
Attending: INTERNAL MEDICINE
Payer: MEDICARE

## 2022-10-06 ENCOUNTER — OFFICE VISIT (OUTPATIENT)
Dept: CARDIOLOGY CLINIC | Facility: HOSPITAL | Age: 77
End: 2022-10-06
Attending: NURSE PRACTITIONER
Payer: MEDICARE

## 2022-10-06 VITALS
WEIGHT: 146.69 LBS | OXYGEN SATURATION: 97 % | DIASTOLIC BLOOD PRESSURE: 58 MMHG | BODY MASS INDEX: 27 KG/M2 | RESPIRATION RATE: 16 BRPM | SYSTOLIC BLOOD PRESSURE: 146 MMHG | HEART RATE: 56 BPM

## 2022-10-06 DIAGNOSIS — I25.10 CORONARY ARTERY DISEASE INVOLVING NATIVE CORONARY ARTERY OF NATIVE HEART WITHOUT ANGINA PECTORIS: Chronic | ICD-10-CM

## 2022-10-06 DIAGNOSIS — Z90.5 S/P NEPHRECTOMY: ICD-10-CM

## 2022-10-06 DIAGNOSIS — N18.32 STAGE 3B CHRONIC KIDNEY DISEASE (HCC): ICD-10-CM

## 2022-10-06 DIAGNOSIS — I10 ESSENTIAL HYPERTENSION: ICD-10-CM

## 2022-10-06 DIAGNOSIS — I50.9 ACUTE ON CHRONIC CONGESTIVE HEART FAILURE, UNSPECIFIED HEART FAILURE TYPE (HCC): ICD-10-CM

## 2022-10-06 DIAGNOSIS — I50.42 CHRONIC COMBINED SYSTOLIC AND DIASTOLIC CHF, NYHA CLASS 2 (HCC): Primary | ICD-10-CM

## 2022-10-06 DIAGNOSIS — I25.5 ISCHEMIC CARDIOMYOPATHY: ICD-10-CM

## 2022-10-06 DIAGNOSIS — Z95.5 STENTED CORONARY ARTERY: ICD-10-CM

## 2022-10-06 DIAGNOSIS — I50.42 CHRONIC COMBINED SYSTOLIC AND DIASTOLIC CHF, NYHA CLASS 2 (HCC): ICD-10-CM

## 2022-10-06 LAB
ANION GAP SERPL CALC-SCNC: 6 MMOL/L (ref 0–18)
BUN BLD-MCNC: 33 MG/DL (ref 7–18)
BUN/CREAT SERPL: 21.2 (ref 10–20)
CALCIUM BLD-MCNC: 8.8 MG/DL (ref 8.5–10.1)
CHLORIDE SERPL-SCNC: 111 MMOL/L (ref 98–112)
CO2 SERPL-SCNC: 24 MMOL/L (ref 21–32)
CREAT BLD-MCNC: 1.56 MG/DL
FASTING STATUS PATIENT QL REPORTED: YES
GFR SERPLBLD BASED ON 1.73 SQ M-ARVRAT: 34 ML/MIN/1.73M2 (ref 60–?)
GLUCOSE BLD-MCNC: 150 MG/DL (ref 70–99)
NT-PROBNP SERPL-MCNC: 2680 PG/ML (ref ?–450)
OSMOLALITY SERPL CALC.SUM OF ELEC: 302 MOSM/KG (ref 275–295)
POTASSIUM SERPL-SCNC: 4.4 MMOL/L (ref 3.5–5.1)
SODIUM SERPL-SCNC: 141 MMOL/L (ref 136–145)

## 2022-10-06 PROCEDURE — 36415 COLL VENOUS BLD VENIPUNCTURE: CPT

## 2022-10-06 PROCEDURE — 83880 ASSAY OF NATRIURETIC PEPTIDE: CPT

## 2022-10-06 PROCEDURE — 93798 PHYS/QHP OP CAR RHAB W/ECG: CPT

## 2022-10-06 PROCEDURE — 99215 OFFICE O/P EST HI 40 MIN: CPT | Performed by: NURSE PRACTITIONER

## 2022-10-06 PROCEDURE — 80048 BASIC METABOLIC PNL TOTAL CA: CPT

## 2022-10-06 NOTE — PROGRESS NOTES
1129 : s/w Arthor Officer and instructed to restart jardiance per Gabriela's instructions. She will also get a BMP in one week. She will do after rehab next week. Order in.

## 2022-10-06 NOTE — PATIENT INSTRUCTIONS
Begin Jardiance 10 mg daily ( spoke with patient over phone after confirming ok to start [address] with Dr. Emiliano Franco)     Continue all your other medications as prescribed     Call if having any dizziness, lightheadedness, heart racing, palpitations, chest pain, shortness of breath, coughing,  wheezing, swelling, weight gain,  or weakness    Call 911 with severe shortness of breath, chest pain or chest pressure not improved after 15 minutes of rest or if feeling faint,  passing out or having a fall     Weigh yourself daily in the morning before breakfast, call if gaining 3 lbs or more overnight or more than 5 lbs in one week. Follow 2000 mg sodium restricted , heart healthy diet, Keep daily fluids at 48-64 ounces per day    Follow up with Dr. Kaden Fernandez in February as scheduled and Dr. Emiliano Franco on 1/9/23    Follow up with the specialty care clinic on 11/1/22 at 915 am     Basic metabolic panel in 1 week. Limit sodium to  2000 mg daily. Common high sodium foods include frozen dinners, soups (not homemade), some cereal, vegetable juice, canned vegetables, lunch meats, processed meats like hotdogs, sausage, banda, pepperoni, soy sauce, pre-packaged rice or potatoes. Please remember to read nutrition labels for sodium content.     www.healthyeating. nhlbi.nih.gov      Exercise daily as tolerated, with goal of doing moderate aerobic exercise like walking for about 30 minutes 5 days per week. Start by walking at a slow to moderate pace for 5-10 minutes 2-3 times a day. Pace your activity to prevent shortness of breath or fatigue.  Stop exercise if you develop chest pain, lightheadedness, or significant shortness of breath

## 2022-10-11 ENCOUNTER — CARDPULM VISIT (OUTPATIENT)
Dept: CARDIAC REHAB | Facility: HOSPITAL | Age: 77
End: 2022-10-11
Attending: INTERNAL MEDICINE
Payer: MEDICARE

## 2022-10-11 PROCEDURE — 93798 PHYS/QHP OP CAR RHAB W/ECG: CPT

## 2022-10-13 ENCOUNTER — TELEPHONE (OUTPATIENT)
Dept: CARDIOLOGY CLINIC | Facility: HOSPITAL | Age: 77
End: 2022-10-13

## 2022-10-13 ENCOUNTER — CARDPULM VISIT (OUTPATIENT)
Dept: CARDIAC REHAB | Facility: HOSPITAL | Age: 77
End: 2022-10-13
Attending: INTERNAL MEDICINE
Payer: MEDICARE

## 2022-10-13 ENCOUNTER — LAB ENCOUNTER (OUTPATIENT)
Dept: LAB | Facility: HOSPITAL | Age: 77
End: 2022-10-13
Attending: NURSE PRACTITIONER
Payer: MEDICARE

## 2022-10-13 DIAGNOSIS — I50.42 CHRONIC COMBINED SYSTOLIC AND DIASTOLIC CHF, NYHA CLASS 2 (HCC): ICD-10-CM

## 2022-10-13 DIAGNOSIS — I50.42 CHRONIC COMBINED SYSTOLIC AND DIASTOLIC CHF, NYHA CLASS 2 (HCC): Primary | ICD-10-CM

## 2022-10-13 LAB
ANION GAP SERPL CALC-SCNC: 9 MMOL/L (ref 0–18)
BUN BLD-MCNC: 32 MG/DL (ref 7–18)
BUN/CREAT SERPL: 21.1 (ref 10–20)
CALCIUM BLD-MCNC: 8.7 MG/DL (ref 8.5–10.1)
CHLORIDE SERPL-SCNC: 112 MMOL/L (ref 98–112)
CO2 SERPL-SCNC: 21 MMOL/L (ref 21–32)
CREAT BLD-MCNC: 1.52 MG/DL
FASTING STATUS PATIENT QL REPORTED: YES
GFR SERPLBLD BASED ON 1.73 SQ M-ARVRAT: 35 ML/MIN/1.73M2 (ref 60–?)
GLUCOSE BLD-MCNC: 110 MG/DL (ref 70–99)
OSMOLALITY SERPL CALC.SUM OF ELEC: 302 MOSM/KG (ref 275–295)
POTASSIUM SERPL-SCNC: 5.2 MMOL/L (ref 3.5–5.1)
SODIUM SERPL-SCNC: 142 MMOL/L (ref 136–145)

## 2022-10-13 PROCEDURE — 93798 PHYS/QHP OP CAR RHAB W/ECG: CPT

## 2022-10-13 PROCEDURE — 80048 BASIC METABOLIC PNL TOTAL CA: CPT

## 2022-10-13 PROCEDURE — 36415 COLL VENOUS BLD VENIPUNCTURE: CPT

## 2022-10-13 NOTE — TELEPHONE ENCOUNTER
Spoke with Kathleen Herron, reviewed lab results and Gabriela's recommendations. She confirmed she is not taking any over the counter potassium supplement and not using Mrs. Dash salt substitute. Expressed understanding of instructions and noted she had two tomato heavy dishes this past week. Order entered.

## 2022-10-13 NOTE — TELEPHONE ENCOUNTER
----- Message from Steven Sosa NP sent at 10/13/2022  1:19 PM CDT -----  Let Gertrude Del Rio know her kidney function test looks good except her potassium levels are in the high side a little above normal.  Confirm she is not taking any over-the-counter potassium supplement and not using Mrs. Crum salt substitute. Reinforced that she stay well-hydrated drinking about 64 ounces of fluid a day and avoid eating too much tomato, bananas,  avocado, orange juice or oranges. I recommend she recheck a basic metabolic panel in 1 week, can be done on one of her cardiac rehab days. Thanks.

## 2022-10-17 ENCOUNTER — TELEPHONE (OUTPATIENT)
Dept: CARDIOLOGY CLINIC | Facility: HOSPITAL | Age: 77
End: 2022-10-17

## 2022-10-17 NOTE — TELEPHONE ENCOUNTER
Received call from [facility] to clarify hydralazine dose. Clarified from last visit notes and review of chart.

## 2022-10-18 ENCOUNTER — CARDPULM VISIT (OUTPATIENT)
Dept: CARDIAC REHAB | Facility: HOSPITAL | Age: 77
End: 2022-10-18
Attending: INTERNAL MEDICINE
Payer: MEDICARE

## 2022-10-18 PROCEDURE — 93798 PHYS/QHP OP CAR RHAB W/ECG: CPT

## 2022-10-20 ENCOUNTER — CARDPULM VISIT (OUTPATIENT)
Dept: CARDIAC REHAB | Facility: HOSPITAL | Age: 77
End: 2022-10-20
Attending: INTERNAL MEDICINE
Payer: MEDICARE

## 2022-10-20 ENCOUNTER — LAB ENCOUNTER (OUTPATIENT)
Dept: LAB | Facility: HOSPITAL | Age: 77
End: 2022-10-20
Attending: NURSE PRACTITIONER
Payer: MEDICARE

## 2022-10-20 DIAGNOSIS — I50.42 CHRONIC COMBINED SYSTOLIC AND DIASTOLIC CHF, NYHA CLASS 2 (HCC): ICD-10-CM

## 2022-10-20 LAB
ANION GAP SERPL CALC-SCNC: 9 MMOL/L (ref 0–18)
BUN BLD-MCNC: 31 MG/DL (ref 7–18)
BUN/CREAT SERPL: 19.5 (ref 10–20)
CALCIUM BLD-MCNC: 8.7 MG/DL (ref 8.5–10.1)
CHLORIDE SERPL-SCNC: 111 MMOL/L (ref 98–112)
CO2 SERPL-SCNC: 20 MMOL/L (ref 21–32)
CREAT BLD-MCNC: 1.59 MG/DL
FASTING STATUS PATIENT QL REPORTED: YES
GFR SERPLBLD BASED ON 1.73 SQ M-ARVRAT: 33 ML/MIN/1.73M2 (ref 60–?)
GLUCOSE BLD-MCNC: 126 MG/DL (ref 70–99)
OSMOLALITY SERPL CALC.SUM OF ELEC: 298 MOSM/KG (ref 275–295)
POTASSIUM SERPL-SCNC: 4.2 MMOL/L (ref 3.5–5.1)
SODIUM SERPL-SCNC: 140 MMOL/L (ref 136–145)

## 2022-10-20 PROCEDURE — 80048 BASIC METABOLIC PNL TOTAL CA: CPT

## 2022-10-20 PROCEDURE — 36415 COLL VENOUS BLD VENIPUNCTURE: CPT

## 2022-10-20 PROCEDURE — 93798 PHYS/QHP OP CAR RHAB W/ECG: CPT

## 2022-10-25 ENCOUNTER — TELEPHONE (OUTPATIENT)
Dept: CARDIOLOGY CLINIC | Facility: HOSPITAL | Age: 77
End: 2022-10-25

## 2022-10-25 ENCOUNTER — CARDPULM VISIT (OUTPATIENT)
Dept: CARDIAC REHAB | Facility: HOSPITAL | Age: 77
End: 2022-10-25
Attending: INTERNAL MEDICINE
Payer: MEDICARE

## 2022-10-25 PROCEDURE — 93798 PHYS/QHP OP CAR RHAB W/ECG: CPT

## 2022-10-27 ENCOUNTER — CARDPULM VISIT (OUTPATIENT)
Dept: CARDIAC REHAB | Facility: HOSPITAL | Age: 77
End: 2022-10-27
Attending: INTERNAL MEDICINE
Payer: MEDICARE

## 2022-10-27 PROCEDURE — 93798 PHYS/QHP OP CAR RHAB W/ECG: CPT

## 2022-11-01 ENCOUNTER — TELEPHONE (OUTPATIENT)
Dept: CARDIOLOGY CLINIC | Facility: HOSPITAL | Age: 77
End: 2022-11-01

## 2022-11-01 ENCOUNTER — OFFICE VISIT (OUTPATIENT)
Dept: CARDIOLOGY CLINIC | Facility: HOSPITAL | Age: 77
End: 2022-11-01
Attending: NURSE PRACTITIONER
Payer: MEDICARE

## 2022-11-01 ENCOUNTER — CARDPULM VISIT (OUTPATIENT)
Dept: CARDIAC REHAB | Facility: HOSPITAL | Age: 77
End: 2022-11-01
Attending: INTERNAL MEDICINE
Payer: MEDICARE

## 2022-11-01 VITALS
BODY MASS INDEX: 28 KG/M2 | OXYGEN SATURATION: 98 % | WEIGHT: 151.13 LBS | DIASTOLIC BLOOD PRESSURE: 51 MMHG | HEART RATE: 58 BPM | SYSTOLIC BLOOD PRESSURE: 129 MMHG

## 2022-11-01 DIAGNOSIS — I25.10 CORONARY ARTERY DISEASE INVOLVING NATIVE CORONARY ARTERY OF NATIVE HEART WITHOUT ANGINA PECTORIS: Chronic | ICD-10-CM

## 2022-11-01 DIAGNOSIS — I50.42 CHRONIC COMBINED SYSTOLIC AND DIASTOLIC CHF, NYHA CLASS 2 (HCC): Primary | ICD-10-CM

## 2022-11-01 DIAGNOSIS — I50.42 CHRONIC COMBINED SYSTOLIC AND DIASTOLIC CHF, NYHA CLASS 2 (HCC): Primary | Chronic | ICD-10-CM

## 2022-11-01 DIAGNOSIS — N18.32 STAGE 3B CHRONIC KIDNEY DISEASE (HCC): ICD-10-CM

## 2022-11-01 PROCEDURE — 93798 PHYS/QHP OP CAR RHAB W/ECG: CPT

## 2022-11-01 PROCEDURE — 99215 OFFICE O/P EST HI 40 MIN: CPT | Performed by: NURSE PRACTITIONER

## 2022-11-01 RX ORDER — SACUBITRIL AND VALSARTAN 49; 51 MG/1; MG/1
1 TABLET, FILM COATED ORAL 2 TIMES DAILY
Qty: 60 TABLET | Refills: 3 | Status: SHIPPED | OUTPATIENT
Start: 2022-11-01

## 2022-11-01 NOTE — PATIENT INSTRUCTIONS
Office will call you after talking with [facility] about entresto coupon and if your entresto dose will increase    Continue all your other medications as prescribed     Call if having any dizziness, lightheadedness, heart racing, palpitations, chest pain, shortness of breath, coughing,  wheezing, swelling, weight gain,  or weakness    Call 911 with severe shortness of breath, chest pain or chest pressure not improved after 15 minutes of rest or if feeling faint,  passing out or having a fall     Weigh yourself daily in the morning before breakfast, call if gaining 3 lbs or more overnight or more than 5 lbs in one week. Follow 2000 mg sodium restricted , heart healthy diet, Keep daily fluids at 48-64 ounces per day    Follow up with Dr. Annette Colmenares in February as scheduled     Follow up with the specialty care clinic on 11/29/22        Limit sodium to  2000 mg daily. Common high sodium foods include frozen dinners, soups (not homemade), some cereal, vegetable juice, canned vegetables, lunch meats, processed meats like hotdogs, sausage, banda, pepperoni, soy sauce, pre-packaged rice or potatoes. Please remember to read nutrition labels for sodium content.     www.healthyeating. nhlbi.nih.gov      Exercise daily as tolerated, with goal of doing moderate aerobic exercise like walking for about 30 minutes 5 days per week. Start by walking at a slow to moderate pace for 5-10 minutes 2-3 times a day. Pace your activity to prevent shortness of breath or fatigue.  Stop exercise if you develop chest pain, lightheadedness, or significant shortness of breath

## 2022-11-01 NOTE — TELEPHONE ENCOUNTER
Kathleen Gravely called back, states she still has 19 days worth of medication left. Discussed with Audra Sessions can continue present entresto dose and start new dose once she completes her current supply and get BMP day of appointment 11/29. Notified Kathleen Gravely of these new instructions. All questions answered. She noted it was Estela Araujo that is being sent too [facility].

## 2022-11-01 NOTE — TELEPHONE ENCOUNTER
Detailed message left (HIPAA verified) for [place] that script for new doses of entresto was called into [facility], that [facility] did not have any refill request on file. Also the 30 day free had all ready been used per [facility].  Added Gabriela's instructions to get blood work 2 weeks after starting new dose and follow up with our office as scheduled in 3 weeks. Number left to call with any questions.

## 2022-11-01 NOTE — TELEPHONE ENCOUNTER
Monica Dumont attempted to reach [facility] to change medication dose for Entresto to 49-51 mg BID. Spoke with pharmacist at [facility], who took free trial offer. The pharmacist noted the [facility] number on the coupon is all ready in her profile meaning she has all ready used this offer. Refill sent thru visit note.

## 2022-11-01 NOTE — PROGRESS NOTES
Josh Quinn came from Cardiac Rehab today. Weight 151.1 lb and 140 lb at home. Denies lightheadedness, shortness of breath, palpitations, chest pain or swelling. Saw Dr. Aleyda Nair 8/10, then his nurse 8/30, next Dr. Luc Gay appointment February 3rd.

## 2022-11-03 ENCOUNTER — CARDPULM VISIT (OUTPATIENT)
Dept: CARDIAC REHAB | Facility: HOSPITAL | Age: 77
End: 2022-11-03
Attending: INTERNAL MEDICINE
Payer: MEDICARE

## 2022-11-03 PROCEDURE — 93798 PHYS/QHP OP CAR RHAB W/ECG: CPT

## 2022-11-08 ENCOUNTER — CARDPULM VISIT (OUTPATIENT)
Dept: CARDIAC REHAB | Facility: HOSPITAL | Age: 77
End: 2022-11-08
Attending: INTERNAL MEDICINE
Payer: MEDICARE

## 2022-11-08 PROCEDURE — 93798 PHYS/QHP OP CAR RHAB W/ECG: CPT

## 2022-11-10 ENCOUNTER — CARDPULM VISIT (OUTPATIENT)
Dept: CARDIAC REHAB | Facility: HOSPITAL | Age: 77
End: 2022-11-10
Attending: INTERNAL MEDICINE
Payer: MEDICARE

## 2022-11-10 PROCEDURE — 93798 PHYS/QHP OP CAR RHAB W/ECG: CPT

## 2022-11-15 ENCOUNTER — CARDPULM VISIT (OUTPATIENT)
Dept: CARDIAC REHAB | Facility: HOSPITAL | Age: 77
End: 2022-11-15
Attending: INTERNAL MEDICINE
Payer: MEDICARE

## 2022-11-15 PROCEDURE — 93798 PHYS/QHP OP CAR RHAB W/ECG: CPT

## 2022-11-17 ENCOUNTER — CARDPULM VISIT (OUTPATIENT)
Dept: CARDIAC REHAB | Facility: HOSPITAL | Age: 77
End: 2022-11-17
Attending: INTERNAL MEDICINE
Payer: MEDICARE

## 2022-11-17 PROCEDURE — 93798 PHYS/QHP OP CAR RHAB W/ECG: CPT

## 2022-11-18 ENCOUNTER — OFFICE VISIT (OUTPATIENT)
Dept: INTERNAL MEDICINE CLINIC | Facility: CLINIC | Age: 77
End: 2022-11-18
Payer: MEDICARE

## 2022-11-18 VITALS
RESPIRATION RATE: 16 BRPM | OXYGEN SATURATION: 98 % | BODY MASS INDEX: 27.3 KG/M2 | HEIGHT: 62 IN | HEART RATE: 51 BPM | WEIGHT: 148.38 LBS | DIASTOLIC BLOOD PRESSURE: 68 MMHG | SYSTOLIC BLOOD PRESSURE: 138 MMHG | TEMPERATURE: 98 F

## 2022-11-18 DIAGNOSIS — E11.9 TYPE 2 DIABETES MELLITUS WITHOUT COMPLICATION, WITHOUT LONG-TERM CURRENT USE OF INSULIN (HCC): ICD-10-CM

## 2022-11-18 DIAGNOSIS — I10 ESSENTIAL HYPERTENSION: ICD-10-CM

## 2022-11-18 DIAGNOSIS — I25.5 ISCHEMIC CARDIOMYOPATHY: Primary | ICD-10-CM

## 2022-11-18 DIAGNOSIS — Z95.5 STENTED CORONARY ARTERY: ICD-10-CM

## 2022-11-18 DIAGNOSIS — N18.4 CKD (CHRONIC KIDNEY DISEASE), STAGE IV (HCC): ICD-10-CM

## 2022-11-18 DIAGNOSIS — Z23 NEED FOR IMMUNIZATION AGAINST INFLUENZA: ICD-10-CM

## 2022-11-18 PROCEDURE — G0008 ADMIN INFLUENZA VIRUS VAC: HCPCS | Performed by: INTERNAL MEDICINE

## 2022-11-18 PROCEDURE — 90662 IIV NO PRSV INCREASED AG IM: CPT | Performed by: INTERNAL MEDICINE

## 2022-11-18 PROCEDURE — 99214 OFFICE O/P EST MOD 30 MIN: CPT | Performed by: INTERNAL MEDICINE

## 2022-11-18 RX ORDER — AMLODIPINE BESYLATE 5 MG/1
5 TABLET ORAL DAILY
Qty: 90 TABLET | Refills: 3 | Status: SHIPPED | OUTPATIENT
Start: 2022-11-18

## 2022-11-21 ENCOUNTER — CARDPULM VISIT (OUTPATIENT)
Dept: CARDIAC REHAB | Facility: HOSPITAL | Age: 77
End: 2022-11-21
Attending: INTERNAL MEDICINE
Payer: MEDICARE

## 2022-11-21 ENCOUNTER — TELEPHONE (OUTPATIENT)
Dept: CARDIOLOGY CLINIC | Facility: HOSPITAL | Age: 77
End: 2022-11-21

## 2022-11-21 PROCEDURE — 93798 PHYS/QHP OP CAR RHAB W/ECG: CPT

## 2022-11-21 NOTE — TELEPHONE ENCOUNTER
Patient called to reschedule her appt as she has not switched entresto to higher dose as had more pills than she thought. New appt several days after med change.

## 2022-11-22 ENCOUNTER — APPOINTMENT (OUTPATIENT)
Dept: CARDIAC REHAB | Facility: HOSPITAL | Age: 77
End: 2022-11-22
Attending: INTERNAL MEDICINE
Payer: MEDICARE

## 2022-11-29 ENCOUNTER — APPOINTMENT (OUTPATIENT)
Dept: CARDIAC REHAB | Facility: HOSPITAL | Age: 77
End: 2022-11-29
Attending: INTERNAL MEDICINE
Payer: MEDICARE

## 2022-11-29 PROCEDURE — 93798 PHYS/QHP OP CAR RHAB W/ECG: CPT

## 2022-12-01 ENCOUNTER — CARDPULM VISIT (OUTPATIENT)
Dept: CARDIAC REHAB | Facility: HOSPITAL | Age: 77
End: 2022-12-01
Attending: INTERNAL MEDICINE
Payer: MEDICARE

## 2022-12-01 PROCEDURE — 93798 PHYS/QHP OP CAR RHAB W/ECG: CPT

## 2022-12-05 DIAGNOSIS — I50.42 CHRONIC COMBINED SYSTOLIC AND DIASTOLIC CHF, NYHA CLASS 2 (HCC): Primary | ICD-10-CM

## 2022-12-06 ENCOUNTER — CARDPULM VISIT (OUTPATIENT)
Dept: CARDIAC REHAB | Facility: HOSPITAL | Age: 77
End: 2022-12-06
Attending: INTERNAL MEDICINE
Payer: MEDICARE

## 2022-12-06 ENCOUNTER — LAB ENCOUNTER (OUTPATIENT)
Dept: LAB | Facility: HOSPITAL | Age: 77
End: 2022-12-06
Attending: NURSE PRACTITIONER
Payer: MEDICARE

## 2022-12-06 ENCOUNTER — OFFICE VISIT (OUTPATIENT)
Dept: CARDIOLOGY CLINIC | Facility: HOSPITAL | Age: 77
End: 2022-12-06
Attending: NURSE PRACTITIONER
Payer: MEDICARE

## 2022-12-06 VITALS
BODY MASS INDEX: 27 KG/M2 | WEIGHT: 149 LBS | OXYGEN SATURATION: 99 % | SYSTOLIC BLOOD PRESSURE: 140 MMHG | DIASTOLIC BLOOD PRESSURE: 55 MMHG | HEART RATE: 50 BPM

## 2022-12-06 DIAGNOSIS — I50.42 CHRONIC COMBINED SYSTOLIC AND DIASTOLIC CHF, NYHA CLASS 2 (HCC): Primary | ICD-10-CM

## 2022-12-06 DIAGNOSIS — Z95.5 STENTED CORONARY ARTERY: ICD-10-CM

## 2022-12-06 DIAGNOSIS — I50.42 CHRONIC COMBINED SYSTOLIC AND DIASTOLIC CHF, NYHA CLASS 2 (HCC): ICD-10-CM

## 2022-12-06 DIAGNOSIS — I10 ESSENTIAL HYPERTENSION: ICD-10-CM

## 2022-12-06 DIAGNOSIS — N18.32 STAGE 3B CHRONIC KIDNEY DISEASE (HCC): ICD-10-CM

## 2022-12-06 DIAGNOSIS — I25.10 CORONARY ARTERY DISEASE INVOLVING NATIVE CORONARY ARTERY OF NATIVE HEART WITHOUT ANGINA PECTORIS: ICD-10-CM

## 2022-12-06 LAB
ANION GAP SERPL CALC-SCNC: 5 MMOL/L (ref 0–18)
BUN BLD-MCNC: 34 MG/DL (ref 7–18)
BUN/CREAT SERPL: 17.3 (ref 10–20)
CALCIUM BLD-MCNC: 8.8 MG/DL (ref 8.5–10.1)
CHLORIDE SERPL-SCNC: 112 MMOL/L (ref 98–112)
CO2 SERPL-SCNC: 25 MMOL/L (ref 21–32)
CREAT BLD-MCNC: 1.96 MG/DL
FASTING STATUS PATIENT QL REPORTED: YES
GFR SERPLBLD BASED ON 1.73 SQ M-ARVRAT: 26 ML/MIN/1.73M2 (ref 60–?)
GLUCOSE BLD-MCNC: 145 MG/DL (ref 70–99)
OSMOLALITY SERPL CALC.SUM OF ELEC: 304 MOSM/KG (ref 275–295)
POTASSIUM SERPL-SCNC: 4.4 MMOL/L (ref 3.5–5.1)
SODIUM SERPL-SCNC: 142 MMOL/L (ref 136–145)

## 2022-12-06 PROCEDURE — 93798 PHYS/QHP OP CAR RHAB W/ECG: CPT

## 2022-12-06 PROCEDURE — 99214 OFFICE O/P EST MOD 30 MIN: CPT | Performed by: NURSE PRACTITIONER

## 2022-12-06 PROCEDURE — 80048 BASIC METABOLIC PNL TOTAL CA: CPT

## 2022-12-06 PROCEDURE — 36415 COLL VENOUS BLD VENIPUNCTURE: CPT

## 2022-12-06 NOTE — PATIENT INSTRUCTIONS
Continue all your medications as prescribed     Call if having shortness of breath, cough, wheezing, chest pain, dizziness, lightheadedness, heart racing, palpitations, swelling, weight gain, fatigue or weakness    Call 911 with severe shortness of breath, chest pain or chest pressure not improved after 15 minutes of rest or if feeling faint,  passing out or having a fall     Weigh yourself daily in the morning before breakfast, call if gaining 3 lbs or more overnight or more than 5 lbs in one week. Follow 2000 mg sodium restricted , heart healthy diet, Keep daily fluids at 48-64 ounces per day    Follow up with Dr. Antonio Foote 1/9/23    Follow up with the specialty care clinic on 3/7/22 at 11 am    Blood tests the first week of January         Limit sodium to  2000 mg daily. Common high sodium foods include frozen dinners, soups (not homemade), some cereal, vegetable juice, canned vegetables, lunch meats, processed meats like hotdogs, sausage, banda, pepperoni, soy sauce, pre-packaged rice or potatoes. Please remember to read nutrition labels for sodium content.     www.healthyeating. nhlbi.nih.gov      Exercise daily as tolerated, with goal of doing moderate aerobic exercise like walking for about 30 minutes 5 days per week. Start by walking at a slow to moderate pace for 5-10 minutes 2-3 times a day. Pace your activity to prevent shortness of breath or fatigue.  Stop exercise if you develop chest pain, lightheadedness, or significant shortness of breath

## 2022-12-08 ENCOUNTER — CARDPULM VISIT (OUTPATIENT)
Dept: CARDIAC REHAB | Facility: HOSPITAL | Age: 77
End: 2022-12-08
Attending: INTERNAL MEDICINE
Payer: MEDICARE

## 2022-12-08 PROCEDURE — 93798 PHYS/QHP OP CAR RHAB W/ECG: CPT

## 2022-12-12 RX ORDER — INSULIN DETEMIR 100 [IU]/ML
28 INJECTION, SOLUTION SUBCUTANEOUS DAILY
Qty: 15 ML | Refills: 3 | Status: SHIPPED | OUTPATIENT
Start: 2022-12-12

## 2022-12-13 ENCOUNTER — APPOINTMENT (OUTPATIENT)
Dept: CARDIAC REHAB | Facility: HOSPITAL | Age: 77
End: 2022-12-13
Attending: INTERNAL MEDICINE
Payer: MEDICARE

## 2022-12-15 ENCOUNTER — APPOINTMENT (OUTPATIENT)
Dept: CARDIAC REHAB | Facility: HOSPITAL | Age: 77
End: 2022-12-15
Attending: INTERNAL MEDICINE
Payer: MEDICARE

## 2022-12-20 ENCOUNTER — CARDPULM VISIT (OUTPATIENT)
Dept: CARDIAC REHAB | Facility: HOSPITAL | Age: 77
End: 2022-12-20
Attending: INTERNAL MEDICINE
Payer: MEDICARE

## 2022-12-20 PROCEDURE — 93798 PHYS/QHP OP CAR RHAB W/ECG: CPT

## 2022-12-22 ENCOUNTER — CARDPULM VISIT (OUTPATIENT)
Dept: CARDIAC REHAB | Facility: HOSPITAL | Age: 77
End: 2022-12-22
Attending: INTERNAL MEDICINE
Payer: MEDICARE

## 2022-12-22 PROCEDURE — 93798 PHYS/QHP OP CAR RHAB W/ECG: CPT

## 2022-12-27 ENCOUNTER — CARDPULM VISIT (OUTPATIENT)
Dept: CARDIAC REHAB | Facility: HOSPITAL | Age: 77
End: 2022-12-27
Attending: INTERNAL MEDICINE
Payer: MEDICARE

## 2022-12-27 PROCEDURE — 93798 PHYS/QHP OP CAR RHAB W/ECG: CPT

## 2022-12-29 ENCOUNTER — CARDPULM VISIT (OUTPATIENT)
Dept: CARDIAC REHAB | Facility: HOSPITAL | Age: 77
End: 2022-12-29
Attending: INTERNAL MEDICINE
Payer: MEDICARE

## 2022-12-29 PROCEDURE — 93798 PHYS/QHP OP CAR RHAB W/ECG: CPT

## 2023-01-03 ENCOUNTER — LAB ENCOUNTER (OUTPATIENT)
Dept: LAB | Facility: HOSPITAL | Age: 78
End: 2023-01-03
Attending: INTERNAL MEDICINE
Payer: MEDICARE

## 2023-01-03 ENCOUNTER — CARDPULM VISIT (OUTPATIENT)
Dept: CARDIAC REHAB | Facility: HOSPITAL | Age: 78
End: 2023-01-03
Attending: INTERNAL MEDICINE
Payer: MEDICARE

## 2023-01-03 DIAGNOSIS — N18.30 STAGE 3 CHRONIC KIDNEY DISEASE, UNSPECIFIED WHETHER STAGE 3A OR 3B CKD (HCC): ICD-10-CM

## 2023-01-03 DIAGNOSIS — E11.9 TYPE 2 DIABETES MELLITUS WITHOUT COMPLICATION, WITHOUT LONG-TERM CURRENT USE OF INSULIN (HCC): ICD-10-CM

## 2023-01-03 LAB
ALBUMIN SERPL-MCNC: 3.5 G/DL (ref 3.4–5)
ALBUMIN/GLOB SERPL: 1.1 {RATIO} (ref 1–2)
ALP LIVER SERPL-CCNC: 69 U/L
ALT SERPL-CCNC: 23 U/L
ANION GAP SERPL CALC-SCNC: 6 MMOL/L (ref 0–18)
AST SERPL-CCNC: 15 U/L (ref 15–37)
BASOPHILS # BLD AUTO: 0.04 X10(3) UL (ref 0–0.2)
BASOPHILS NFR BLD AUTO: 0.5 %
BILIRUB SERPL-MCNC: 0.4 MG/DL (ref 0.1–2)
BUN BLD-MCNC: 37 MG/DL (ref 7–18)
BUN/CREAT SERPL: 21 (ref 10–20)
CALCIUM BLD-MCNC: 8.7 MG/DL (ref 8.5–10.1)
CHLORIDE SERPL-SCNC: 113 MMOL/L (ref 98–112)
CHOLEST SERPL-MCNC: 118 MG/DL (ref ?–200)
CO2 SERPL-SCNC: 22 MMOL/L (ref 21–32)
CREAT BLD-MCNC: 1.76 MG/DL
DEPRECATED RDW RBC AUTO: 41.3 FL (ref 35.1–46.3)
EOSINOPHIL # BLD AUTO: 0.22 X10(3) UL (ref 0–0.7)
EOSINOPHIL NFR BLD AUTO: 2.7 %
ERYTHROCYTE [DISTWIDTH] IN BLOOD BY AUTOMATED COUNT: 12 % (ref 11–15)
EST. AVERAGE GLUCOSE BLD GHB EST-MCNC: 126 MG/DL (ref 68–126)
FASTING PATIENT LIPID ANSWER: YES
FASTING STATUS PATIENT QL REPORTED: YES
GFR SERPLBLD BASED ON 1.73 SQ M-ARVRAT: 29 ML/MIN/1.73M2 (ref 60–?)
GLOBULIN PLAS-MCNC: 3.2 G/DL (ref 2.8–4.4)
GLUCOSE BLD-MCNC: 135 MG/DL (ref 70–99)
HBA1C MFR BLD: 6 % (ref ?–5.7)
HCT VFR BLD AUTO: 38.2 %
HDLC SERPL-MCNC: 63 MG/DL (ref 40–59)
HGB BLD-MCNC: 12.5 G/DL
IMM GRANULOCYTES # BLD AUTO: 0.03 X10(3) UL (ref 0–1)
IMM GRANULOCYTES NFR BLD: 0.4 %
LDLC SERPL CALC-MCNC: 31 MG/DL (ref ?–100)
LYMPHOCYTES # BLD AUTO: 0.73 X10(3) UL (ref 1–4)
LYMPHOCYTES NFR BLD AUTO: 9 %
MCH RBC QN AUTO: 30.6 PG (ref 26–34)
MCHC RBC AUTO-ENTMCNC: 32.7 G/DL (ref 31–37)
MCV RBC AUTO: 93.6 FL
MONOCYTES # BLD AUTO: 0.54 X10(3) UL (ref 0.1–1)
MONOCYTES NFR BLD AUTO: 6.7 %
NEUTROPHILS # BLD AUTO: 6.53 X10 (3) UL (ref 1.5–7.7)
NEUTROPHILS # BLD AUTO: 6.53 X10(3) UL (ref 1.5–7.7)
NEUTROPHILS NFR BLD AUTO: 80.7 %
NONHDLC SERPL-MCNC: 55 MG/DL (ref ?–130)
OSMOLALITY SERPL CALC.SUM OF ELEC: 303 MOSM/KG (ref 275–295)
PHOSPHATE SERPL-MCNC: 4 MG/DL (ref 2.5–4.9)
PLATELET # BLD AUTO: 245 10(3)UL (ref 150–450)
POTASSIUM SERPL-SCNC: 4.8 MMOL/L (ref 3.5–5.1)
PROT SERPL-MCNC: 6.7 G/DL (ref 6.4–8.2)
RBC # BLD AUTO: 4.08 X10(6)UL
SODIUM SERPL-SCNC: 141 MMOL/L (ref 136–145)
TRIGL SERPL-MCNC: 141 MG/DL (ref 30–149)
TSI SER-ACNC: 2.57 MIU/ML (ref 0.36–3.74)
VLDLC SERPL CALC-MCNC: 19 MG/DL (ref 0–30)
WBC # BLD AUTO: 8.1 X10(3) UL (ref 4–11)

## 2023-01-03 PROCEDURE — 84443 ASSAY THYROID STIM HORMONE: CPT

## 2023-01-03 PROCEDURE — 84100 ASSAY OF PHOSPHORUS: CPT

## 2023-01-03 PROCEDURE — 36415 COLL VENOUS BLD VENIPUNCTURE: CPT

## 2023-01-03 PROCEDURE — 93798 PHYS/QHP OP CAR RHAB W/ECG: CPT

## 2023-01-03 PROCEDURE — 85025 COMPLETE CBC W/AUTO DIFF WBC: CPT

## 2023-01-03 PROCEDURE — 80061 LIPID PANEL: CPT

## 2023-01-03 PROCEDURE — 83036 HEMOGLOBIN GLYCOSYLATED A1C: CPT

## 2023-01-03 PROCEDURE — 80053 COMPREHEN METABOLIC PANEL: CPT

## 2023-01-05 ENCOUNTER — APPOINTMENT (OUTPATIENT)
Dept: CARDIAC REHAB | Facility: HOSPITAL | Age: 78
End: 2023-01-05
Attending: INTERNAL MEDICINE
Payer: MEDICARE

## 2023-01-06 ENCOUNTER — TELEPHONE (OUTPATIENT)
Dept: INTERNAL MEDICINE CLINIC | Facility: CLINIC | Age: 78
End: 2023-01-06

## 2023-01-06 NOTE — TELEPHONE ENCOUNTER
1/3/2023-cbc cmp tsh lipid L3f--SEJ 29 (stable) A1c 6.0. I note patient has seen results. I sent patient [facility] message. Patient has upcoming visit 1/17/2023.

## 2023-01-09 DIAGNOSIS — N18.30 CHRONIC RENAL FAILURE, STAGE 3 (MODERATE), UNSPECIFIED WHETHER STAGE 3A OR 3B CKD (HCC): Primary | ICD-10-CM

## 2023-01-17 ENCOUNTER — OFFICE VISIT (OUTPATIENT)
Dept: INTERNAL MEDICINE CLINIC | Facility: CLINIC | Age: 78
End: 2023-01-17

## 2023-01-17 VITALS
TEMPERATURE: 98 F | WEIGHT: 154 LBS | BODY MASS INDEX: 28.34 KG/M2 | HEIGHT: 62 IN | HEART RATE: 52 BPM | SYSTOLIC BLOOD PRESSURE: 136 MMHG | DIASTOLIC BLOOD PRESSURE: 70 MMHG

## 2023-01-17 DIAGNOSIS — Z12.31 VISIT FOR SCREENING MAMMOGRAM: ICD-10-CM

## 2023-01-17 DIAGNOSIS — E11.9 TYPE 2 DIABETES MELLITUS WITHOUT COMPLICATION, WITHOUT LONG-TERM CURRENT USE OF INSULIN (HCC): ICD-10-CM

## 2023-01-17 DIAGNOSIS — Z95.5 STENTED CORONARY ARTERY: ICD-10-CM

## 2023-01-17 DIAGNOSIS — I25.5 ISCHEMIC CARDIOMYOPATHY: Primary | ICD-10-CM

## 2023-01-17 DIAGNOSIS — I10 ESSENTIAL HYPERTENSION: ICD-10-CM

## 2023-01-17 DIAGNOSIS — N18.4 CKD (CHRONIC KIDNEY DISEASE), STAGE IV (HCC): ICD-10-CM

## 2023-01-17 DIAGNOSIS — E78.2 HYPERLIPIDEMIA, MIXED: ICD-10-CM

## 2023-01-17 PROCEDURE — 99214 OFFICE O/P EST MOD 30 MIN: CPT | Performed by: INTERNAL MEDICINE

## 2023-01-30 ENCOUNTER — TELEMEDICINE (OUTPATIENT)
Dept: INTERNAL MEDICINE CLINIC | Facility: CLINIC | Age: 78
End: 2023-01-30

## 2023-01-30 ENCOUNTER — TELEPHONE (OUTPATIENT)
Dept: INTERNAL MEDICINE CLINIC | Facility: CLINIC | Age: 78
End: 2023-01-30

## 2023-01-30 DIAGNOSIS — M54.41 ACUTE RIGHT-SIDED LOW BACK PAIN WITH RIGHT-SIDED SCIATICA: Primary | ICD-10-CM

## 2023-01-30 RX ORDER — CYCLOBENZAPRINE HCL 5 MG
5 TABLET ORAL 3 TIMES DAILY PRN
Qty: 20 TABLET | Refills: 1 | Status: SHIPPED | OUTPATIENT
Start: 2023-01-30

## 2023-01-30 RX ORDER — METHYLPREDNISOLONE 4 MG/1
TABLET ORAL
Qty: 1 EACH | Refills: 0 | Status: SHIPPED | OUTPATIENT
Start: 2023-01-30

## 2023-01-30 NOTE — TELEPHONE ENCOUNTER
Called patient. Patient has pain in her right leg since waking up on 1/27. She did not injure her leg, just woke up with the pain. Patient states it's ok when she is lying down or sitting. It is most painful when she walks. She says she can't put weight on it. She is using a walker and a cane. She normally doesn't use either. The pain starts in her butt/hip and shoots down the leg. Pain level is 8-10 at worst. She is taking Tylenol, but it isn't helping much. Offered appointment, but patient says it will be hard for her to come here. Preferred pharmacy is [facility] in [place].      To Dr. Evie Capellan to please advise--

## 2023-01-30 NOTE — TELEPHONE ENCOUNTER
Caregiver involvement: Daughter is caregiver, she  Assists with ADLs, Medication management, Transportation to appointments, Meal prep and assists with ambulation. Medications reconciled and all medications are available in the home this visit. (except Lasix as stated in [facility] note)   Medications  are somewhat effective at this time. Home health supplies by type and quantity ordered/delivered this visit include: n/a    Patient education provided this visit: I also discussed with patient and caregiver that sternal and calf incisions are healing well with no s/s of infection present. Pt aware to leave incisions open to air and to avoid rubbing incisions. patient made aware to monitor for s/s of infection [increased swelling, increased redness around site, increased pain, foul smelling drainage, fever] aware who to report to/when. Patient is a fall risk, went over the need of having someone with her when ambulating, keep hallways and living areas free of clutter and throw rugs. Continue a heart Healthy diet. Watch out for high sodium foods, read labels. Observe for signs of infection. Monitor B/P, take meds as ordered and f/u with PCP. MEDICATION ADHERENCE IS AN IMPORTANT COMPONENT OF HTN MANAGEMENT. PATIENT STATES THE IMPORTANCE TO TAKE HTN MEDS SAME TIME EACH DAY    Progress toward goals: Breathing is much better today. Patient is getting 1250-1500cc on ICS. She said she used it all day yesterday and this morning as instructed. Home exercise program: Discussed s/s of infection to monitor for, s/s of UTI, who to report to/when. Instructed cg to notify staff/md/seek tx if complications occur. Patient instructed to maintain clear pathways in home and to minimize clutter to prevent falls from occurring/minimize fall potential.   Patient needing a well balanced diet with the 5 food groups, patient to increase fiber in diet, fresh fruits and vegetables, whole grains and increase water intake.     Maintain Patient is calling and states on the morning of 1/27/23 she woke up and had pain lifting her right leg. Patient also states her right hip is hurting also. Patient states she has to use a walker to get around the house because she can not put any pressure on that leg. Patient states she is in no pain while seating it only hurts when she moves.     Please call and advise healthy low sodium  diet, Continue to monitor B/P and observe for signs of infection. Work with PT to increase strength and f/u with PCP. :I went over the importance of taking all prescriptions as ordered. I discussed how to avoid extra sodium in her diet. We discussed the signs of infection and when to call MD.  We discussed the high risk for falls and ways to prevent falls in the future. We discussed taking B/P daily and keeping a log. We also discussed the need of a heart healthy diet, and the need to change positions frequently. Gaining strength with PT for increased mobility. Continued need for the following skills: Nursing, Physical Therapy and Occupational Therapy    The following discharge planning was discussed with the pt/caregiver: Will discharge from nursing when education is complete and patient is medically stable.

## 2023-02-03 ENCOUNTER — HOSPITAL ENCOUNTER (OUTPATIENT)
Dept: GENERAL RADIOLOGY | Facility: HOSPITAL | Age: 78
Discharge: HOME | End: 2023-02-03
Attending: INTERNAL MEDICINE
Payer: MEDICARE

## 2023-02-03 DIAGNOSIS — M54.41 ACUTE RIGHT-SIDED LOW BACK PAIN WITH RIGHT-SIDED SCIATICA: ICD-10-CM

## 2023-02-03 PROCEDURE — 72110 X-RAY EXAM L-2 SPINE 4/>VWS: CPT | Performed by: INTERNAL MEDICINE

## 2023-02-15 ENCOUNTER — HOSPITAL ENCOUNTER (OUTPATIENT)
Dept: MRI IMAGING | Age: 78
Discharge: HOME | End: 2023-02-15
Attending: INTERNAL MEDICINE
Payer: MEDICARE

## 2023-02-15 DIAGNOSIS — M54.41 ACUTE RIGHT-SIDED LOW BACK PAIN WITH RIGHT-SIDED SCIATICA: ICD-10-CM

## 2023-02-15 PROCEDURE — 72148 MRI LUMBAR SPINE W/O DYE: CPT | Performed by: INTERNAL MEDICINE

## 2023-02-16 ENCOUNTER — TELEPHONE (OUTPATIENT)
Dept: INTERNAL MEDICINE CLINIC | Facility: CLINIC | Age: 78
End: 2023-02-16

## 2023-02-16 NOTE — TELEPHONE ENCOUNTER
Dr. Chemo Fowler, please review imaging results for Dr. Evie Capellan. Ok to wait for Dr. Evie Capellan on Monday? Thank you.

## 2023-02-16 NOTE — TELEPHONE ENCOUNTER
Noted.  I have reviewed patient's MRI result and also reviewed patient's chart. Patient's been having much pain going down from her right back to her right leg for about the last month. Patient's MRI has shown her to have what appears to be herniated disc at L3-L4 on the right side. This can wait for Dr. Javier Sanders I will forward this message to Dr. Javier Sanders.   Thank you!!

## 2023-02-17 NOTE — TELEPHONE ENCOUNTER
2/15/23-MRI lumbar spine--L3-L4 right disc extrusion, results in mod to severe right lateral recess stenosis and effacement of the traversing right L4 nerve root. I note patient has seen results. I sent patient [employer] Clave message. She has upcoming appointment with Dr. Sean Duckworth on 2/21/2023. Has PT order entered 2/7/23.

## 2023-02-21 ENCOUNTER — OFFICE VISIT (OUTPATIENT)
Dept: PHYSICAL MEDICINE AND REHAB | Facility: CLINIC | Age: 78
End: 2023-02-21
Payer: MEDICARE

## 2023-02-21 VITALS — HEIGHT: 62 IN | BODY MASS INDEX: 26.68 KG/M2 | OXYGEN SATURATION: 97 % | HEART RATE: 65 BPM | WEIGHT: 145 LBS

## 2023-02-21 DIAGNOSIS — M54.50 ACUTE RIGHT-SIDED LOW BACK PAIN WITHOUT SCIATICA: ICD-10-CM

## 2023-02-21 DIAGNOSIS — I50.42 CHRONIC COMBINED SYSTOLIC AND DIASTOLIC CHF, NYHA CLASS 2 (HCC): Primary | ICD-10-CM

## 2023-02-21 DIAGNOSIS — M53.3 SACRAL BACK PAIN: Primary | ICD-10-CM

## 2023-02-21 PROCEDURE — 99204 OFFICE O/P NEW MOD 45 MIN: CPT | Performed by: PHYSICAL MEDICINE & REHABILITATION

## 2023-02-21 RX ORDER — HYDROCODONE BITARTRATE AND ACETAMINOPHEN 5; 325 MG/1; MG/1
1 TABLET ORAL EVERY 8 HOURS PRN
Qty: 21 TABLET | Refills: 0 | Status: SHIPPED | OUTPATIENT
Start: 2023-02-21 | End: 2023-02-28

## 2023-02-21 NOTE — PATIENT INSTRUCTIONS
Pain medication sent to your pharmacy. I have ordered an MRI of your pelvis, we will reach out once this is complete to discuss the results.

## 2023-02-25 ENCOUNTER — HOSPITAL ENCOUNTER (OUTPATIENT)
Dept: MRI IMAGING | Facility: HOSPITAL | Age: 78
Discharge: HOME | End: 2023-02-25
Attending: PHYSICAL MEDICINE & REHABILITATION
Payer: MEDICARE

## 2023-02-25 DIAGNOSIS — M53.3 SACRAL BACK PAIN: ICD-10-CM

## 2023-02-25 DIAGNOSIS — M54.50 ACUTE RIGHT-SIDED LOW BACK PAIN WITHOUT SCIATICA: ICD-10-CM

## 2023-02-25 PROCEDURE — 72195 MRI PELVIS W/O DYE: CPT | Performed by: PHYSICAL MEDICINE & REHABILITATION

## 2023-02-27 ENCOUNTER — TELEPHONE (OUTPATIENT)
Dept: PHYSICAL MEDICINE AND REHAB | Facility: CLINIC | Age: 78
End: 2023-02-27

## 2023-02-27 RX ORDER — SACUBITRIL AND VALSARTAN 49; 51 MG/1; MG/1
1 TABLET, FILM COATED ORAL 2 TIMES DAILY
Qty: 60 TABLET | Refills: 11 | Status: SHIPPED | OUTPATIENT
Start: 2023-02-27

## 2023-02-27 NOTE — TELEPHONE ENCOUNTER
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      There was an incidental finding of a fluid-filled prominent endometrial cavity which is partially imaged, this may be contributing to her pain complaints     Video visit is scheduled on 3/1/23 at 1 pm.

## 2023-02-28 ENCOUNTER — LAB ENCOUNTER (OUTPATIENT)
Dept: LAB | Facility: HOSPITAL | Age: 78
End: 2023-02-28
Attending: NURSE PRACTITIONER
Payer: MEDICARE

## 2023-02-28 DIAGNOSIS — I50.42 CHRONIC COMBINED SYSTOLIC AND DIASTOLIC CHF, NYHA CLASS 2 (HCC): ICD-10-CM

## 2023-02-28 LAB
ANION GAP SERPL CALC-SCNC: 4 MMOL/L (ref 0–18)
BUN BLD-MCNC: 25 MG/DL (ref 7–18)
BUN/CREAT SERPL: 15.5 (ref 10–20)
CALCIUM BLD-MCNC: 9.2 MG/DL (ref 8.5–10.1)
CHLORIDE SERPL-SCNC: 114 MMOL/L (ref 98–112)
CO2 SERPL-SCNC: 24 MMOL/L (ref 21–32)
CREAT BLD-MCNC: 1.61 MG/DL
FASTING STATUS PATIENT QL REPORTED: YES
GFR SERPLBLD BASED ON 1.73 SQ M-ARVRAT: 32 ML/MIN/1.73M2 (ref 60–?)
GLUCOSE BLD-MCNC: 120 MG/DL (ref 70–99)
NT-PROBNP SERPL-MCNC: 1811 PG/ML (ref ?–450)
OSMOLALITY SERPL CALC.SUM OF ELEC: 300 MOSM/KG (ref 275–295)
POTASSIUM SERPL-SCNC: 4.7 MMOL/L (ref 3.5–5.1)
SODIUM SERPL-SCNC: 142 MMOL/L (ref 136–145)

## 2023-02-28 PROCEDURE — 83880 ASSAY OF NATRIURETIC PEPTIDE: CPT

## 2023-02-28 PROCEDURE — 36415 COLL VENOUS BLD VENIPUNCTURE: CPT

## 2023-02-28 PROCEDURE — 80048 BASIC METABOLIC PNL TOTAL CA: CPT

## 2023-03-01 ENCOUNTER — TELEMEDICINE (OUTPATIENT)
Dept: PHYSICAL MEDICINE AND REHAB | Facility: CLINIC | Age: 78
End: 2023-03-01
Payer: MEDICARE

## 2023-03-01 DIAGNOSIS — N85.9 FLUID IN ENDOMETRIAL CAVITY: ICD-10-CM

## 2023-03-01 DIAGNOSIS — M54.50 ACUTE RIGHT-SIDED LOW BACK PAIN WITHOUT SCIATICA: Primary | ICD-10-CM

## 2023-03-01 PROCEDURE — 99214 OFFICE O/P EST MOD 30 MIN: CPT | Performed by: PHYSICAL MEDICINE & REHABILITATION

## 2023-03-02 ENCOUNTER — TELEPHONE (OUTPATIENT)
Dept: INTERNAL MEDICINE CLINIC | Facility: CLINIC | Age: 78
End: 2023-03-02

## 2023-03-02 ENCOUNTER — HOSPITAL ENCOUNTER (OUTPATIENT)
Dept: ULTRASOUND IMAGING | Age: 78
Discharge: HOME | End: 2023-03-02
Attending: PHYSICAL MEDICINE & REHABILITATION
Payer: MEDICARE

## 2023-03-02 DIAGNOSIS — N85.9 FLUID IN ENDOMETRIAL CAVITY: ICD-10-CM

## 2023-03-02 PROCEDURE — 76856 US EXAM PELVIC COMPLETE: CPT | Performed by: PHYSICAL MEDICINE & REHABILITATION

## 2023-03-02 PROCEDURE — 76830 TRANSVAGINAL US NON-OB: CPT | Performed by: PHYSICAL MEDICINE & REHABILITATION

## 2023-03-03 ENCOUNTER — OFFICE VISIT (OUTPATIENT)
Dept: OBGYN CLINIC | Facility: CLINIC | Age: 78
End: 2023-03-03

## 2023-03-03 VITALS
SYSTOLIC BLOOD PRESSURE: 183 MMHG | HEART RATE: 62 BPM | WEIGHT: 150.63 LBS | DIASTOLIC BLOOD PRESSURE: 66 MMHG | BODY MASS INDEX: 28 KG/M2

## 2023-03-03 DIAGNOSIS — R93.89 THICKENED ENDOMETRIUM: Primary | ICD-10-CM

## 2023-03-03 PROCEDURE — 99202 OFFICE O/P NEW SF 15 MIN: CPT | Performed by: OBSTETRICS & GYNECOLOGY

## 2023-03-03 NOTE — TELEPHONE ENCOUNTER
Called and spoke with patient. Relayed MD's message. Patient verbalized understanding. Contact information for Dr. Natarajan Quant given.

## 2023-03-07 ENCOUNTER — OFFICE VISIT (OUTPATIENT)
Dept: CARDIOLOGY CLINIC | Facility: HOSPITAL | Age: 78
End: 2023-03-07
Attending: NURSE PRACTITIONER
Payer: MEDICARE

## 2023-03-07 VITALS
DIASTOLIC BLOOD PRESSURE: 61 MMHG | SYSTOLIC BLOOD PRESSURE: 147 MMHG | RESPIRATION RATE: 16 BRPM | HEART RATE: 67 BPM | WEIGHT: 154.63 LBS | OXYGEN SATURATION: 97 % | BODY MASS INDEX: 28 KG/M2

## 2023-03-07 DIAGNOSIS — I50.42 CHRONIC COMBINED SYSTOLIC AND DIASTOLIC CHF, NYHA CLASS 2 (HCC): Primary | ICD-10-CM

## 2023-03-07 DIAGNOSIS — I10 ESSENTIAL HYPERTENSION: ICD-10-CM

## 2023-03-07 DIAGNOSIS — I25.5 ISCHEMIC CARDIOMYOPATHY: ICD-10-CM

## 2023-03-07 DIAGNOSIS — I25.10 CORONARY ARTERY DISEASE INVOLVING NATIVE CORONARY ARTERY OF NATIVE HEART WITHOUT ANGINA PECTORIS: Chronic | ICD-10-CM

## 2023-03-07 DIAGNOSIS — N18.32 STAGE 3B CHRONIC KIDNEY DISEASE (HCC): ICD-10-CM

## 2023-03-07 PROBLEM — M54.50 CHRONIC MIDLINE LOW BACK PAIN WITHOUT SCIATICA: Status: ACTIVE | Noted: 2023-03-07

## 2023-03-07 PROBLEM — G89.29 CHRONIC MIDLINE LOW BACK PAIN WITHOUT SCIATICA: Status: ACTIVE | Noted: 2023-03-07

## 2023-03-07 PROBLEM — I50.9 ACUTE ON CHRONIC CONGESTIVE HEART FAILURE, UNSPECIFIED HEART FAILURE TYPE (HCC): Status: RESOLVED | Noted: 2022-05-25 | Resolved: 2023-03-07

## 2023-03-07 PROCEDURE — 99215 OFFICE O/P EST HI 40 MIN: CPT | Performed by: NURSE PRACTITIONER

## 2023-03-07 NOTE — PROGRESS NOTES
Subjective: Gino Ganser is here today and feels good heart wise , but is having back issues that make ambulating difficult. Is having work up done on this issue. No edema noted to BLE. Weight:  Today 154.6 lbs    Last visit 149#  Labs completed : at lab on 3/2     1525: phone call and instructed Gino Ganser , per Lawrence Landaverde, to increase her current entresto to 2 tabs twice daily and when tabs gone, start new dose(sent to pharmacy). Patient and medication assessed. Discussed with APN. Disease management teaching completed. Reviewed AVS instructions. Patient verbalized understanding.

## 2023-03-08 ENCOUNTER — OFFICE VISIT (OUTPATIENT)
Dept: INTERNAL MEDICINE CLINIC | Facility: CLINIC | Age: 78
End: 2023-03-08

## 2023-03-08 VITALS
BODY MASS INDEX: 28.52 KG/M2 | SYSTOLIC BLOOD PRESSURE: 140 MMHG | TEMPERATURE: 99 F | WEIGHT: 155 LBS | HEART RATE: 60 BPM | HEIGHT: 62 IN | DIASTOLIC BLOOD PRESSURE: 80 MMHG

## 2023-03-08 DIAGNOSIS — N18.4 CKD (CHRONIC KIDNEY DISEASE), STAGE IV (HCC): ICD-10-CM

## 2023-03-08 DIAGNOSIS — I25.5 ISCHEMIC CARDIOMYOPATHY: ICD-10-CM

## 2023-03-08 DIAGNOSIS — R93.89 THICKENED ENDOMETRIUM: ICD-10-CM

## 2023-03-08 DIAGNOSIS — E11.9 TYPE 2 DIABETES MELLITUS WITHOUT COMPLICATION, WITHOUT LONG-TERM CURRENT USE OF INSULIN (HCC): ICD-10-CM

## 2023-03-08 DIAGNOSIS — Z95.5 STENTED CORONARY ARTERY: ICD-10-CM

## 2023-03-08 DIAGNOSIS — M54.16 RIGHT LUMBAR RADICULOPATHY: Primary | ICD-10-CM

## 2023-03-08 PROCEDURE — 99214 OFFICE O/P EST MOD 30 MIN: CPT | Performed by: INTERNAL MEDICINE

## 2023-03-08 RX ORDER — SACUBITRIL AND VALSARTAN 49; 51 MG/1; MG/1
2 TABLET, FILM COATED ORAL 2 TIMES DAILY
Refills: 0 | COMMUNITY
Start: 2023-03-08

## 2023-03-09 ENCOUNTER — OFFICE VISIT (OUTPATIENT)
Dept: OBGYN CLINIC | Facility: CLINIC | Age: 78
End: 2023-03-09

## 2023-03-09 VITALS — HEART RATE: 57 BPM | DIASTOLIC BLOOD PRESSURE: 75 MMHG | SYSTOLIC BLOOD PRESSURE: 173 MMHG

## 2023-03-09 DIAGNOSIS — R93.89 THICKENED ENDOMETRIUM: Primary | ICD-10-CM

## 2023-03-09 PROCEDURE — 58100 BIOPSY OF UTERUS LINING: CPT | Performed by: OBSTETRICS & GYNECOLOGY

## 2023-03-09 NOTE — PROCEDURES
Endometrial Biopsy    Pre-Procedure Care:   Consent was obtained. Procedure/risks were explained. Questions were answered. Correct patient was identified. Correct side and site were confirmed. Pre-Medications: The patient was premedicated with Acetaminophen. Description of Procedure:  Under satisfactory analgesia, the patient was prepped and draped in the dorsal lithotomy position. A bivalve speculum was placed in the vagina and the cervix was prepped with Betadine solution. Single tooth tenaculum placed at the 12 o'clock position. Cervical stenosis encountered. The cervix was dilated using minidilators. The uterine cavity was sounded at 6 cm. The endometrial cavity was curetted for pipelle tissue sampling, 2 passes. Specimen was sent to pathology. The single tooth tenaculum was removed. Good hemostasis was noted. There were no complications. There was no blood loss. Discharge instructions were provided to the patient. Visit Plan:  Await final pathology prior to treatment.

## 2023-03-14 RX ORDER — FLURBIPROFEN SODIUM 0.3 MG/ML
SOLUTION/ DROPS OPHTHALMIC
Qty: 300 EACH | Refills: 3 | Status: SHIPPED | OUTPATIENT
Start: 2023-03-14

## 2023-03-23 NOTE — TELEPHONE ENCOUNTER
Patient is calling to request a refill on her Free Style Test Strips. Please send refill to [facility].

## 2023-03-24 RX ORDER — BLOOD-GLUCOSE METER
1 KIT MISCELLANEOUS 3 TIMES DAILY
Qty: 300 STRIP | Refills: 3 | Status: SHIPPED | OUTPATIENT
Start: 2023-03-24

## 2023-03-27 ENCOUNTER — LAB ENCOUNTER (OUTPATIENT)
Dept: LAB | Facility: HOSPITAL | Age: 78
End: 2023-03-27
Attending: NURSE PRACTITIONER
Payer: MEDICARE

## 2023-03-27 DIAGNOSIS — I50.42 CHRONIC COMBINED SYSTOLIC AND DIASTOLIC CHF, NYHA CLASS 2 (HCC): ICD-10-CM

## 2023-03-27 LAB
ANION GAP SERPL CALC-SCNC: 6 MMOL/L (ref 0–18)
BUN BLD-MCNC: 27 MG/DL (ref 7–18)
BUN/CREAT SERPL: 16.2 (ref 10–20)
CALCIUM BLD-MCNC: 8.6 MG/DL (ref 8.5–10.1)
CHLORIDE SERPL-SCNC: 115 MMOL/L (ref 98–112)
CO2 SERPL-SCNC: 21 MMOL/L (ref 21–32)
CREAT BLD-MCNC: 1.67 MG/DL
FASTING STATUS PATIENT QL REPORTED: YES
GFR SERPLBLD BASED ON 1.73 SQ M-ARVRAT: 31 ML/MIN/1.73M2 (ref 60–?)
GLUCOSE BLD-MCNC: 124 MG/DL (ref 70–99)
OSMOLALITY SERPL CALC.SUM OF ELEC: 301 MOSM/KG (ref 275–295)
POTASSIUM SERPL-SCNC: 4.6 MMOL/L (ref 3.5–5.1)
SODIUM SERPL-SCNC: 142 MMOL/L (ref 136–145)

## 2023-03-27 PROCEDURE — 80048 BASIC METABOLIC PNL TOTAL CA: CPT

## 2023-03-27 PROCEDURE — 36415 COLL VENOUS BLD VENIPUNCTURE: CPT

## 2023-03-29 ENCOUNTER — OFFICE VISIT (OUTPATIENT)
Dept: CARDIOLOGY CLINIC | Facility: HOSPITAL | Age: 78
End: 2023-03-29
Attending: NURSE PRACTITIONER
Payer: MEDICARE

## 2023-03-29 VITALS
RESPIRATION RATE: 16 BRPM | BODY MASS INDEX: 29 KG/M2 | HEART RATE: 53 BPM | OXYGEN SATURATION: 98 % | SYSTOLIC BLOOD PRESSURE: 171 MMHG | WEIGHT: 157.38 LBS | DIASTOLIC BLOOD PRESSURE: 63 MMHG

## 2023-03-29 DIAGNOSIS — I25.10 CORONARY ARTERY DISEASE INVOLVING NATIVE CORONARY ARTERY OF NATIVE HEART WITHOUT ANGINA PECTORIS: Chronic | ICD-10-CM

## 2023-03-29 DIAGNOSIS — I25.5 ISCHEMIC CARDIOMYOPATHY: ICD-10-CM

## 2023-03-29 DIAGNOSIS — I10 ESSENTIAL HYPERTENSION: ICD-10-CM

## 2023-03-29 DIAGNOSIS — I50.42 CHRONIC COMBINED SYSTOLIC AND DIASTOLIC CHF, NYHA CLASS 2 (HCC): Primary | ICD-10-CM

## 2023-03-29 DIAGNOSIS — N18.32 STAGE 3B CHRONIC KIDNEY DISEASE (HCC): ICD-10-CM

## 2023-03-29 PROCEDURE — 99214 OFFICE O/P EST MOD 30 MIN: CPT | Performed by: NURSE PRACTITIONER

## 2023-03-29 RX ORDER — SPIRONOLACTONE 25 MG/1
25 TABLET ORAL DAILY
Qty: 90 TABLET | Refills: 1 | Status: SHIPPED | OUTPATIENT
Start: 2023-03-29

## 2023-03-29 NOTE — PATIENT INSTRUCTIONS
Begin spironolactone 25 mg tablet daily     Blood test for basic metabolic panel   week after starting spironolactone     Continue all your medications as prescribed     Call if having shortness of breath, cough, wheezing, chest pain, dizziness, lightheadedness, heart racing, palpitations, swelling, weight gain, fatigue or weakness    Call 911 with severe shortness of breath, chest pain or chest pressure not improved after 15 minutes of rest or if feeling faint,  passing out or having a fall     Weigh yourself daily in the morning before breakfast, call if gaining 3 lbs or more overnight or more than 5 lbs in one week. Follow 2000 mg sodium restricted , heart healthy diet, Keep daily fluids at 48-64 ounces per day    Follow up with Dr. Alba Victoria in May     Follow up with the specialty care clinic on 4/24/23 at 10 , come at 9: 30 for blood test prior to         Limit sodium to  2000 mg daily. Common high sodium foods include frozen dinners, soups (not homemade), some cereal, vegetable juice, canned vegetables, lunch meats, processed meats like hotdogs, sausage, banda, pepperoni, soy sauce, pre-packaged rice or potatoes. Please remember to read nutrition labels for sodium content.     www.healthyeating. nhlbi.nih.gov      Exercise daily as tolerated, with goal of doing moderate aerobic exercise like walking for about 30 minutes 5 days per week. Start by walking at a slow to moderate pace for 5-10 minutes 2-3 times a day. Pace your activity to prevent shortness of breath or fatigue.  Stop exercise if you develop chest pain, lightheadedness, or significant shortness of breath

## 2023-03-29 NOTE — PROGRESS NOTES
Subjective: Asad Nair is here , per w/c due to pain in hip issue. States she feels fine. Denies BONILLA, no pedal edema noted. Weight:  Today 157.4 lbs    Last visit 155 #  Labs completed : at lab -done on 3/27. Device:  None  IV placed:  No    Patient and medication assessed. Discussed with APN. Disease management teaching completed. Reviewed AVS instructions. Patient verbalized understanding.

## 2023-04-03 ENCOUNTER — TELEPHONE (OUTPATIENT)
Dept: CARDIOLOGY CLINIC | Facility: HOSPITAL | Age: 78
End: 2023-04-03

## 2023-04-03 NOTE — TELEPHONE ENCOUNTER
Olga Minion called twice on Friday, leaving messages regarding needing to know how to take entresto. Needs clarification on name of medication and dosing instructions. Chart reviewed, Kaycee Lewis started spironolactone 25 mg daily.   MAR: entresto  1 tab twice daily

## 2023-04-11 ENCOUNTER — OFFICE VISIT (OUTPATIENT)
Dept: PAIN CLINIC | Facility: HOSPITAL | Age: 78
End: 2023-04-11
Attending: ANESTHESIOLOGY
Payer: MEDICARE

## 2023-04-11 ENCOUNTER — TELEPHONE (OUTPATIENT)
Dept: PAIN CLINIC | Facility: HOSPITAL | Age: 78
End: 2023-04-11

## 2023-04-11 VITALS
SYSTOLIC BLOOD PRESSURE: 147 MMHG | HEIGHT: 62 IN | DIASTOLIC BLOOD PRESSURE: 70 MMHG | WEIGHT: 155 LBS | BODY MASS INDEX: 28.52 KG/M2 | HEART RATE: 89 BPM

## 2023-04-11 DIAGNOSIS — M51.9 INTERVERTEBRAL LUMBAR DISC DISORDER: ICD-10-CM

## 2023-04-11 DIAGNOSIS — G89.29 CHRONIC MIDLINE LOW BACK PAIN WITHOUT SCIATICA: Primary | ICD-10-CM

## 2023-04-11 DIAGNOSIS — M47.818 ARTHRITIS OF SACROILIAC JOINT OF BOTH SIDES: ICD-10-CM

## 2023-04-11 DIAGNOSIS — M53.3 SACROILIAC JOINT PAIN: ICD-10-CM

## 2023-04-11 DIAGNOSIS — M54.16 RIGHT LUMBAR RADICULOPATHY: ICD-10-CM

## 2023-04-11 DIAGNOSIS — M54.50 CHRONIC MIDLINE LOW BACK PAIN WITHOUT SCIATICA: Primary | ICD-10-CM

## 2023-04-11 DIAGNOSIS — M47.816 LUMBAR FACET ARTHROPATHY: ICD-10-CM

## 2023-04-11 PROBLEM — M46.1 ARTHRITIS OF SACROILIAC JOINT OF BOTH SIDES: Status: ACTIVE | Noted: 2023-04-11

## 2023-04-11 PROBLEM — M46.1 ARTHRITIS OF SACROILIAC JOINT OF BOTH SIDES (HCC): Status: ACTIVE | Noted: 2023-04-11

## 2023-04-11 PROCEDURE — 99202 OFFICE O/P NEW SF 15 MIN: CPT

## 2023-04-11 NOTE — TELEPHONE ENCOUNTER
Right Sacroiliac Joint Injection - Cpt [id number] - Dx M46.1 - NO PA REQUIRED    Status: Pre-certification is not require. Per Medicare Guidelines: may be subject to review once claim is submitted. All Medicare coverage is based on Medical Necessity.

## 2023-04-11 NOTE — PROGRESS NOTES
4/11/2023-Presents in [place] 21 accompanied by her son Nic Leiva; Prudence Riedel; pt reports Aung Granados started the end of January 2023;denies injury/traum-\"just started\"; she states she only has pain with activity 9-10/10; \"when I sit --No pain\"; pt has cardiac hx and is on PLAVIX-informed pt would need cardiac clearance to stop medication 7 days prior for injection, she verbalized understanding,and would contact Dr. Napoleon Prieto office for that-should the tx be injection; Examined by Dr. Dominic Mckinney; reviewed MRI; refer to dictation for the plan of care.

## 2023-04-12 NOTE — TELEPHONE ENCOUNTER
Scheduled procedure for: 4/18/23    Procedure follow-up for: 5/2/23 at 10 am.    Surgery Scheduling Form faxed to [facility] at [phone]

## 2023-04-13 ENCOUNTER — TELEPHONE (OUTPATIENT)
Dept: CARDIOLOGY CLINIC | Facility: HOSPITAL | Age: 78
End: 2023-04-13

## 2023-04-13 ENCOUNTER — LAB ENCOUNTER (OUTPATIENT)
Dept: LAB | Facility: HOSPITAL | Age: 78
End: 2023-04-13
Attending: NURSE PRACTITIONER
Payer: MEDICARE

## 2023-04-13 DIAGNOSIS — I50.42 CHRONIC COMBINED SYSTOLIC AND DIASTOLIC CHF, NYHA CLASS 2 (HCC): ICD-10-CM

## 2023-04-13 LAB
ANION GAP SERPL CALC-SCNC: 6 MMOL/L (ref 0–18)
BUN BLD-MCNC: 41 MG/DL (ref 7–18)
BUN/CREAT SERPL: 22.9 (ref 10–20)
CALCIUM BLD-MCNC: 8.6 MG/DL (ref 8.5–10.1)
CHLORIDE SERPL-SCNC: 117 MMOL/L (ref 98–112)
CO2 SERPL-SCNC: 20 MMOL/L (ref 21–32)
CREAT BLD-MCNC: 1.79 MG/DL
FASTING STATUS PATIENT QL REPORTED: NO
GFR SERPLBLD BASED ON 1.73 SQ M-ARVRAT: 28 ML/MIN/1.73M2 (ref 60–?)
GLUCOSE BLD-MCNC: 166 MG/DL (ref 70–99)
OSMOLALITY SERPL CALC.SUM OF ELEC: 310 MOSM/KG (ref 275–295)
POTASSIUM SERPL-SCNC: 5.3 MMOL/L (ref 3.5–5.1)
SODIUM SERPL-SCNC: 143 MMOL/L (ref 136–145)

## 2023-04-13 PROCEDURE — 36415 COLL VENOUS BLD VENIPUNCTURE: CPT

## 2023-04-13 PROCEDURE — 80048 BASIC METABOLIC PNL TOTAL CA: CPT

## 2023-04-13 RX ORDER — SPIRONOLACTONE 25 MG/1
12.5 TABLET ORAL DAILY
Qty: 45 TABLET | Refills: 1 | Status: SHIPPED | OUTPATIENT
Start: 2023-04-13

## 2023-04-14 NOTE — TELEPHONE ENCOUNTER
Messaged patient with bmp results,  kidney function is stable on the new medication spironolactone but  potassium level is slightly elevated at 5.3. Decrease spironolactone to 12.5 mg daily, will recheck bmp at next visit with me at the Specialty care clinic on 4/24.

## 2023-04-15 NOTE — PATIENT INSTRUCTIONS
You were seen in clinic today for follow-up. Today, we focused on:    Ongoing back pain  - Continue with resting, icing, stretches as tolerated  -Hold aspirin and Plavix the day of your injection.  - Continue with pain medication such as Tylenol on as-needed basis  - We will see if the injection by pain clinic gives us good relief, we will need to follow-up after injection. We have a good plan in place with the pain clinic.      Gynecology results showed no evidence of cancer on the endometrial biopsy    Continue follow-up with your heart specialist for management of your heart disease  - Continue checking your blood pressures at home  - Continue checking your blood sugars at home    You may be due for diabetic eye examination with Dr. Jayson Gill    I ordered fasting blood work to be completed prior to your next physical examination in 4 months

## 2023-04-17 ENCOUNTER — OFFICE VISIT (OUTPATIENT)
Dept: INTERNAL MEDICINE CLINIC | Facility: CLINIC | Age: 78
End: 2023-04-17

## 2023-04-17 VITALS
BODY MASS INDEX: 28.34 KG/M2 | WEIGHT: 154 LBS | DIASTOLIC BLOOD PRESSURE: 84 MMHG | SYSTOLIC BLOOD PRESSURE: 124 MMHG | OXYGEN SATURATION: 98 % | HEIGHT: 62 IN | HEART RATE: 62 BPM

## 2023-04-17 DIAGNOSIS — E11.9 TYPE 2 DIABETES MELLITUS WITHOUT COMPLICATION, WITHOUT LONG-TERM CURRENT USE OF INSULIN (HCC): ICD-10-CM

## 2023-04-17 DIAGNOSIS — M54.16 RIGHT LUMBAR RADICULOPATHY: Primary | ICD-10-CM

## 2023-04-17 DIAGNOSIS — Z13.89 SCREENING FOR NEPHROPATHY: ICD-10-CM

## 2023-04-17 DIAGNOSIS — E78.2 HYPERLIPIDEMIA, MIXED: ICD-10-CM

## 2023-04-17 DIAGNOSIS — N18.4 CKD (CHRONIC KIDNEY DISEASE), STAGE IV (HCC): ICD-10-CM

## 2023-04-17 DIAGNOSIS — I10 ESSENTIAL HYPERTENSION: ICD-10-CM

## 2023-04-17 DIAGNOSIS — Z13.0 SCREENING FOR DEFICIENCY ANEMIA: ICD-10-CM

## 2023-04-17 DIAGNOSIS — R93.89 THICKENED ENDOMETRIUM: ICD-10-CM

## 2023-04-17 DIAGNOSIS — E55.9 VITAMIN D DEFICIENCY: ICD-10-CM

## 2023-04-17 DIAGNOSIS — I25.5 ISCHEMIC CARDIOMYOPATHY: ICD-10-CM

## 2023-04-17 DIAGNOSIS — Z95.5 STENTED CORONARY ARTERY: ICD-10-CM

## 2023-04-17 DIAGNOSIS — Z13.29 SCREENING FOR THYROID DISORDER: ICD-10-CM

## 2023-04-17 PROCEDURE — 99214 OFFICE O/P EST MOD 30 MIN: CPT | Performed by: INTERNAL MEDICINE

## 2023-04-18 ENCOUNTER — SURGERY CENTER DOCUMENTATION (OUTPATIENT)
Dept: SURGERY | Age: 78
End: 2023-04-18

## 2023-04-20 ENCOUNTER — TELEPHONE (OUTPATIENT)
Dept: CARDIOLOGY CLINIC | Facility: HOSPITAL | Age: 78
End: 2023-04-20

## 2023-04-20 DIAGNOSIS — I50.42 CHRONIC COMBINED SYSTOLIC AND DIASTOLIC CHF, NYHA CLASS 2 (HCC): Primary | ICD-10-CM

## 2023-04-20 NOTE — TELEPHONE ENCOUNTER
BMP@ lab. Appointment reminder given and requested to come 30 minutes early for lab draw. Lab order entered and scheduled.

## 2023-04-20 NOTE — PROCEDURES
[facility] PROCEDURE NOTE     DATE OF SERVICE: 2023    : 1945    PREOPERATIVE DIAGNOSIS: Sacroiliac Joint Pain and Dysfunction; Sacrococcygeal Disorder     POSTOPERATIVE DIAGNOSIS:  Sacroiliac Joint Pain; Sacrococcygeal Disorder     PROCEDURE PERFORMED:  right sacroiliac joint injection(s)  under fluoroscopic guidance      SURGEON: April Swan DO     ANESTHESIA: Local Infiltration     INDICATIONS:  The patient is 76year old female who presented to the Pain Clinic with complaint of  acute onset of right sided lower back pain, which began at the end of 2023 without any clear inciting traumatic or other event. The pain is located in the right lower lumbar and upper sacral area and is not radiating into the abdomen, groin or into the legs. In office evaluation  was consistent with sacroiliac joint pain and dysfunction. The patient was recommended the above injection to help relieve her pain as she has attempted conservative options such as oral opioid analgesics and adjuvants without much benefit. She wishes to proceed with the injection today. The risks and benefits related to the injection were discussed in detail. The patient acknowledged understanding and agreed to proceed. Written informed consent was signed. PROCEDURE:  The patient was taken into the fluoroscopy suite and was placed prone on the fluoroscopy table. Routine monitors were applied including blood pressure, and pulse oximetry. Time out was conducted to confirm the correct patient, injection and site of the injection. She remained awake and responsive throughout the procedure. The lower back area was prepped with chlorhexidine and draped with sterile towels in the usual sterile fashion. Aseptic technique was used throughout the procedure. The landmarks and the right sacroiliac joint space was identified with AP fluoroscopic view with tilt to overlap the posterior and anterior joint space lines.  The needle entry site was marked and infiltrated with1% lidocaine via a 25-gauge needle. Following this, a 22-gauge 3.5-inch spinal needle was placed under fluoroscopic guidance into the inferior aspect of the joint space without difficulty. Lteral views showed appropriate needle positioning. After negative aspiration for blood, 1cc of contrast dye was injected into the joint. Fluoroscopic images confirmed the presence of the dye within the joint space. At this point, 2mL of 0.25% Bupivicaine and 40 mg of Depo Medrol was injected into the joint without difficulty. The needle was removed and bandaid placed over the skin. The patient tolerated procedure well, and there were no complications. The patient recovered from sedation uneventfully; received discharge instructions/post procedure instructions, and was instructed to return to our care in 2 weeks' time for reevaluation and possible injection if pain and symptoms warrant. Neurologic exam was intact and unchanged from the preop. The patient was discharged home in stable and ambulatory condition.       Lissette Loyola DO  Anesthesiology  Pain Medicine

## 2023-04-24 ENCOUNTER — OFFICE VISIT (OUTPATIENT)
Dept: CARDIOLOGY CLINIC | Facility: HOSPITAL | Age: 78
End: 2023-04-24
Attending: NURSE PRACTITIONER
Payer: MEDICARE

## 2023-04-24 ENCOUNTER — LAB ENCOUNTER (OUTPATIENT)
Dept: LAB | Facility: HOSPITAL | Age: 78
End: 2023-04-24
Attending: NURSE PRACTITIONER
Payer: MEDICARE

## 2023-04-24 VITALS
BODY MASS INDEX: 28 KG/M2 | WEIGHT: 155.63 LBS | DIASTOLIC BLOOD PRESSURE: 65 MMHG | SYSTOLIC BLOOD PRESSURE: 169 MMHG | OXYGEN SATURATION: 98 % | HEART RATE: 51 BPM

## 2023-04-24 DIAGNOSIS — E87.5 HYPERKALEMIA: ICD-10-CM

## 2023-04-24 DIAGNOSIS — N18.32 STAGE 3B CHRONIC KIDNEY DISEASE (HCC): ICD-10-CM

## 2023-04-24 DIAGNOSIS — I50.43 ACUTE ON CHRONIC COMBINED SYSTOLIC AND DIASTOLIC HF (HEART FAILURE), NYHA CLASS 3 (HCC): Primary | Chronic | ICD-10-CM

## 2023-04-24 DIAGNOSIS — I50.42 CHRONIC COMBINED SYSTOLIC AND DIASTOLIC CHF, NYHA CLASS 2 (HCC): ICD-10-CM

## 2023-04-24 DIAGNOSIS — I25.5 ISCHEMIC CARDIOMYOPATHY: ICD-10-CM

## 2023-04-24 DIAGNOSIS — I10 ESSENTIAL HYPERTENSION: ICD-10-CM

## 2023-04-24 LAB
ANION GAP SERPL CALC-SCNC: 8 MMOL/L (ref 0–18)
BUN BLD-MCNC: 42 MG/DL (ref 7–18)
BUN/CREAT SERPL: 23.6 (ref 10–20)
CALCIUM BLD-MCNC: 8.4 MG/DL (ref 8.5–10.1)
CHLORIDE SERPL-SCNC: 115 MMOL/L (ref 98–112)
CO2 SERPL-SCNC: 20 MMOL/L (ref 21–32)
CREAT BLD-MCNC: 1.78 MG/DL
FASTING STATUS PATIENT QL REPORTED: YES
GFR SERPLBLD BASED ON 1.73 SQ M-ARVRAT: 29 ML/MIN/1.73M2 (ref 60–?)
GLUCOSE BLD-MCNC: 205 MG/DL (ref 70–99)
OSMOLALITY SERPL CALC.SUM OF ELEC: 312 MOSM/KG (ref 275–295)
POTASSIUM SERPL-SCNC: 5.6 MMOL/L (ref 3.5–5.1)
SODIUM SERPL-SCNC: 143 MMOL/L (ref 136–145)

## 2023-04-24 PROCEDURE — 80048 BASIC METABOLIC PNL TOTAL CA: CPT

## 2023-04-24 PROCEDURE — 99215 OFFICE O/P EST HI 40 MIN: CPT | Performed by: NURSE PRACTITIONER

## 2023-04-24 PROCEDURE — 36415 COLL VENOUS BLD VENIPUNCTURE: CPT

## 2023-04-24 NOTE — PATIENT INSTRUCTIONS
Stop spironolactone     Blood test for basic metabolic panel in 1 week     Continue all your medications as prescribed     Call if having shortness of breath, cough, wheezing, chest pain, dizziness, lightheadedness, heart racing, palpitations, swelling, weight gain, fatigue or weakness    Call 911 with severe shortness of breath, chest pain or chest pressure not improved after 15 minutes of rest or if feeling faint,  passing out or having a fall     Weigh yourself daily in the morning before breakfast, call if gaining 3 lbs or more overnight or more than 5 lbs in one week. Follow 2000 mg sodium restricted , heart healthy diet, Keep daily fluids at 48-64 ounces per day    Follow up with Dr. Marlene Fraga next week     Follow up with the specialty care clinic in 1 month on May 30th at 10 am     Check home blood pressure reading daily and record and bring to your next visit at Dr. Ambriz Manual office for review         Limit sodium to  2000 mg daily. Common high sodium foods include frozen dinners, soups (not homemade), some cereal, vegetable juice, canned vegetables, lunch meats, processed meats like hotdogs, sausage, banda, pepperoni, soy sauce, pre-packaged rice or potatoes. Please remember to read nutrition labels for sodium content.     www.healthyeating. nhlbi.nih.gov      Exercise daily as tolerated, with goal of doing moderate aerobic exercise like walking for about 30 minutes 5 days per week. Start by walking at a slow to moderate pace for 5-10 minutes 2-3 times a day. Pace your activity to prevent shortness of breath or fatigue.  Stop exercise if you develop chest pain, lightheadedness, or significant shortness of breath

## 2023-04-24 NOTE — PROGRESS NOTES
Subjective:  Annamarie Haney comes in today via wheelchair and with her son Hersnelson Soares. She has been having sacroiliac pain and had injection last week that is not working as well and was not able to make long walk in from parking lot. Uses walker at home. Denies lightheadedness, or dizziness, chest pain or palpitations, swelling or shortness of breath.     Weight:  Today  155.6 lb   Home none Last visit 157 lb  Labs completed : at lab BMP  IV placed:  No  Measurements :  RLE 12\"    LLE 12.5\"

## 2023-05-02 ENCOUNTER — LAB ENCOUNTER (OUTPATIENT)
Dept: LAB | Facility: HOSPITAL | Age: 78
End: 2023-05-02
Attending: NURSE PRACTITIONER
Payer: MEDICARE

## 2023-05-02 ENCOUNTER — TELEPHONE (OUTPATIENT)
Dept: PAIN CLINIC | Facility: HOSPITAL | Age: 78
End: 2023-05-02

## 2023-05-02 ENCOUNTER — OFFICE VISIT (OUTPATIENT)
Dept: PAIN CLINIC | Facility: HOSPITAL | Age: 78
End: 2023-05-02
Attending: NURSE PRACTITIONER
Payer: MEDICARE

## 2023-05-02 VITALS — HEART RATE: 52 BPM | SYSTOLIC BLOOD PRESSURE: 137 MMHG | OXYGEN SATURATION: 96 % | DIASTOLIC BLOOD PRESSURE: 47 MMHG

## 2023-05-02 DIAGNOSIS — I50.42 CHRONIC COMBINED SYSTOLIC AND DIASTOLIC CHF, NYHA CLASS 2 (HCC): ICD-10-CM

## 2023-05-02 DIAGNOSIS — M54.16 RIGHT LUMBAR RADICULOPATHY: Primary | ICD-10-CM

## 2023-05-02 DIAGNOSIS — M53.3 SACROILIAC JOINT PAIN: ICD-10-CM

## 2023-05-02 LAB
ANION GAP SERPL CALC-SCNC: 7 MMOL/L (ref 0–18)
BUN BLD-MCNC: 42 MG/DL (ref 7–18)
BUN/CREAT SERPL: 22.2 (ref 10–20)
CALCIUM BLD-MCNC: 8.9 MG/DL (ref 8.5–10.1)
CHLORIDE SERPL-SCNC: 118 MMOL/L (ref 98–112)
CO2 SERPL-SCNC: 17 MMOL/L (ref 21–32)
CREAT BLD-MCNC: 1.89 MG/DL
FASTING STATUS PATIENT QL REPORTED: YES
GFR SERPLBLD BASED ON 1.73 SQ M-ARVRAT: 27 ML/MIN/1.73M2 (ref 60–?)
GLUCOSE BLD-MCNC: 146 MG/DL (ref 70–99)
OSMOLALITY SERPL CALC.SUM OF ELEC: 307 MOSM/KG (ref 275–295)
POTASSIUM SERPL-SCNC: 5.6 MMOL/L (ref 3.5–5.1)
SODIUM SERPL-SCNC: 142 MMOL/L (ref 136–145)

## 2023-05-02 PROCEDURE — 99211 OFF/OP EST MAY X REQ PHY/QHP: CPT

## 2023-05-02 PROCEDURE — 36415 COLL VENOUS BLD VENIPUNCTURE: CPT

## 2023-05-02 PROCEDURE — 80048 BASIC METABOLIC PNL TOTAL CA: CPT

## 2023-05-02 NOTE — TELEPHONE ENCOUNTER
Lumbar MARY L4/5 - Cpt 82344 - Dx M54.16, M51.36, M48.06 - NO PA REQUIRED    Status: Pre-certification is not require. Per Medicare Guidelines: may be subject to review once claim is submitted. All Medicare coverage is based on Medical Necessity.

## 2023-05-02 NOTE — PROGRESS NOTES
PT presents to the CPM in [facility]. Son in attendance. PT states about 10% improvement but is still unable to walk or stand without pain. 0/10 pain while sitting 8/10 while walking and standing. ADRIÁN Lan saw PT for med eval.  See notes for POC.

## 2023-05-02 NOTE — TELEPHONE ENCOUNTER
Scheduled procedure for: 5/15/23    Procedure follow-up for: 6/20/23 at 10 am.    Surgery Scheduling Form faxed to [facility] at [phone]

## 2023-05-04 ENCOUNTER — TELEPHONE (OUTPATIENT)
Dept: CARDIOLOGY CLINIC | Facility: HOSPITAL | Age: 78
End: 2023-05-04

## 2023-05-18 RX ORDER — INSULIN DETEMIR 100 [IU]/ML
28 INJECTION, SOLUTION SUBCUTANEOUS DAILY
Qty: 15 ML | Refills: 5 | Status: SHIPPED | OUTPATIENT
Start: 2023-05-18 | End: 2023-05-18 | Stop reason: RX

## 2023-05-18 RX ORDER — INSULIN DETEMIR 100 [IU]/ML
28 INJECTION, SOLUTION SUBCUTANEOUS NIGHTLY
Qty: 15 ML | Refills: 5 | Status: SHIPPED | OUTPATIENT
Start: 2023-05-18

## 2023-05-18 NOTE — TELEPHONE ENCOUNTER
To Dr. Meredith Vincent:  Please review pended refill request for a former Dr. Leonor Rees patient.    Has upcoming Medicare Annual visit scheduled with you in August.

## 2023-05-24 ENCOUNTER — TELEPHONE (OUTPATIENT)
Dept: CARDIOLOGY CLINIC | Facility: HOSPITAL | Age: 78
End: 2023-05-24

## 2023-05-24 DIAGNOSIS — I50.42 CHRONIC COMBINED SYSTOLIC AND DIASTOLIC CHF, NYHA CLASS 2 (HCC): Primary | ICD-10-CM

## 2023-05-25 RX ORDER — ROSUVASTATIN CALCIUM 20 MG/1
20 TABLET, COATED ORAL NIGHTLY
Qty: 90 TABLET | Refills: 0 | Status: SHIPPED | OUTPATIENT
Start: 2023-05-25

## 2023-05-30 ENCOUNTER — OFFICE VISIT (OUTPATIENT)
Dept: CARDIOLOGY CLINIC | Facility: HOSPITAL | Age: 78
End: 2023-05-30
Attending: NURSE PRACTITIONER
Payer: MEDICARE

## 2023-05-30 ENCOUNTER — TELEPHONE (OUTPATIENT)
Dept: CARDIOLOGY CLINIC | Facility: HOSPITAL | Age: 78
End: 2023-05-30

## 2023-05-30 ENCOUNTER — LAB ENCOUNTER (OUTPATIENT)
Dept: LAB | Facility: HOSPITAL | Age: 78
End: 2023-05-30
Attending: NURSE PRACTITIONER
Payer: MEDICARE

## 2023-05-30 VITALS
DIASTOLIC BLOOD PRESSURE: 55 MMHG | WEIGHT: 158.81 LBS | BODY MASS INDEX: 29 KG/M2 | HEART RATE: 54 BPM | SYSTOLIC BLOOD PRESSURE: 149 MMHG | OXYGEN SATURATION: 98 %

## 2023-05-30 DIAGNOSIS — I50.42 CHRONIC COMBINED SYSTOLIC AND DIASTOLIC CHF, NYHA CLASS 2 (HCC): ICD-10-CM

## 2023-05-30 DIAGNOSIS — I10 ESSENTIAL HYPERTENSION: ICD-10-CM

## 2023-05-30 DIAGNOSIS — I50.42 CHRONIC COMBINED SYSTOLIC AND DIASTOLIC CHF, NYHA CLASS 2 (HCC): Primary | ICD-10-CM

## 2023-05-30 DIAGNOSIS — N18.32 STAGE 3B CHRONIC KIDNEY DISEASE (HCC): ICD-10-CM

## 2023-05-30 DIAGNOSIS — Z95.5 STENTED CORONARY ARTERY: ICD-10-CM

## 2023-05-30 DIAGNOSIS — I25.5 ISCHEMIC CARDIOMYOPATHY: ICD-10-CM

## 2023-05-30 LAB
ANION GAP SERPL CALC-SCNC: 4 MMOL/L (ref 0–18)
BUN BLD-MCNC: 50 MG/DL (ref 7–18)
BUN/CREAT SERPL: 27.3 (ref 10–20)
CALCIUM BLD-MCNC: 8.7 MG/DL (ref 8.5–10.1)
CHLORIDE SERPL-SCNC: 116 MMOL/L (ref 98–112)
CO2 SERPL-SCNC: 20 MMOL/L (ref 21–32)
CREAT BLD-MCNC: 1.83 MG/DL
FASTING STATUS PATIENT QL REPORTED: YES
GFR SERPLBLD BASED ON 1.73 SQ M-ARVRAT: 28 ML/MIN/1.73M2 (ref 60–?)
GLUCOSE BLD-MCNC: 153 MG/DL (ref 70–99)
NT-PROBNP SERPL-MCNC: 2411 PG/ML (ref ?–450)
OSMOLALITY SERPL CALC.SUM OF ELEC: 306 MOSM/KG (ref 275–295)
POTASSIUM SERPL-SCNC: 5.5 MMOL/L (ref 3.5–5.1)
SODIUM SERPL-SCNC: 140 MMOL/L (ref 136–145)

## 2023-05-30 PROCEDURE — 80048 BASIC METABOLIC PNL TOTAL CA: CPT

## 2023-05-30 PROCEDURE — 36415 COLL VENOUS BLD VENIPUNCTURE: CPT

## 2023-05-30 PROCEDURE — 99215 OFFICE O/P EST HI 40 MIN: CPT | Performed by: NURSE PRACTITIONER

## 2023-05-30 PROCEDURE — 83880 ASSAY OF NATRIURETIC PEPTIDE: CPT

## 2023-05-30 RX ORDER — ISOSORBIDE MONONITRATE 30 MG/1
30 TABLET, EXTENDED RELEASE ORAL DAILY
Qty: 90 TABLET | Refills: 3 | Status: SHIPPED | OUTPATIENT
Start: 2023-05-30

## 2023-05-30 RX ORDER — FUROSEMIDE 20 MG/1
20 TABLET ORAL DAILY
Qty: 90 TABLET | Refills: 3 | Status: SHIPPED | OUTPATIENT
Start: 2023-05-30

## 2023-05-30 NOTE — PATIENT INSTRUCTIONS
Continue all your medications as prescribed     Call if having shortness of breath, cough, wheezing, chest pain, dizziness, lightheadedness, heart racing, palpitations, swelling, weight gain, fatigue or weakness    Call 911 with severe shortness of breath, chest pain or chest pressure not improved after 15 minutes of rest or if feeling faint,  passing out or having a fall     Weigh yourself daily in the morning before breakfast, call if gaining 3 lbs or more overnight or more than 5 lbs in one week. Follow 2000 mg sodium restricted , heart healthy diet, Keep daily fluids at  about 64 ounces per day. Avoid potassium rich foods    Follow up with Dr. Laurie Carmen in 6 months    Follow up with the specialty care clinic on August 28 th         Limit sodium to  2000 mg daily. Common high sodium foods include frozen dinners, soups (not homemade), some cereal, vegetable juice, canned vegetables, lunch meats, processed meats like hotdogs, sausage, banda, pepperoni, soy sauce, pre-packaged rice or potatoes. Please remember to read nutrition labels for sodium content.     www.healthyeating. nhlbi.nih.gov      Exercise daily as tolerated, with goal of doing moderate aerobic exercise like walking for about 30 minutes 5 days per week. Start by walking at a slow to moderate pace for 5-10 minutes 2-3 times a day. Pace your activity to prevent shortness of breath or fatigue.  Stop exercise if you develop chest pain, lightheadedness, or significant shortness of breath

## 2023-05-30 NOTE — PROGRESS NOTES
Fausto Shen comes in via wheelchair with son Eva Steven. Subjective: Fausto Shen is still experiencing back pain with pain of 10 while walking. Had cortisone injections. Denies pain when sitting or laying. Weight:    Today 158.8 lb Home 150# Last visit 155#   Labs completed : at lab BMP  IV placed:  NO  Measurements :  RLE 12.25\"  LLE 12.5\"    Needs refill on furosemide, isosorbide, rosuvastatin. Refill request faxed to Dr. Daniel Poster office. Patient and medication assessed. Discussed with APN. Disease management teaching completed. Reviewed AVS instructions. Patient verbalized understanding.

## 2023-05-30 NOTE — TELEPHONE ENCOUNTER
Mark Prado needs refill thru mail order for the Rosuvastatin. Please send thru Dr. Guero Soni. Thank you. Above message faxed to [facility].

## 2023-06-20 ENCOUNTER — OFFICE VISIT (OUTPATIENT)
Dept: PAIN CLINIC | Facility: HOSPITAL | Age: 78
End: 2023-06-20
Attending: ANESTHESIOLOGY
Payer: MEDICARE

## 2023-06-20 VITALS — OXYGEN SATURATION: 96 % | DIASTOLIC BLOOD PRESSURE: 50 MMHG | SYSTOLIC BLOOD PRESSURE: 121 MMHG | HEART RATE: 55 BPM

## 2023-06-20 PROCEDURE — 99211 OFF/OP EST MAY X REQ PHY/QHP: CPT

## 2023-06-20 NOTE — PROGRESS NOTES
PT presents to the CPM in [facility]. Son in attendance. PT states no improvement and is unable to walk or stand without pain. 0/10 pain while sitting 10/10 while walking and standing. Pt uses a walker. DR. Sam Cook saw PT for LESI L5/S1 F/U. See notes for POC.

## 2023-06-22 ENCOUNTER — TELEPHONE (OUTPATIENT)
Dept: PAIN CLINIC | Facility: HOSPITAL | Age: 78
End: 2023-06-22

## 2023-06-22 NOTE — TELEPHONE ENCOUNTER
Bilateral Diagnostic Medical Branch Block L3/4, L4/5, L5/S1 - Cpt 36781 -50, 25697 -50, 15727 -50 - Dx M46.96, M47.816 - NO PA REQUIRED    Status: Pre-certification is not require. Per Medicare Guidelines: may be subject to review once claim is submitted. All Medicare coverage is based on Medical Necessity.

## 2023-06-22 NOTE — TELEPHONE ENCOUNTER
Scheduled procedure for: 07/06/23    Procedure follow-up for: 07/21/23 at 9 am.    Surgery Scheduling Form faxed to [facility] at [phone]

## 2023-07-06 ENCOUNTER — LAB ENCOUNTER (OUTPATIENT)
Dept: LAB | Facility: HOSPITAL | Age: 78
End: 2023-07-06
Attending: INTERNAL MEDICINE
Payer: MEDICARE

## 2023-07-06 DIAGNOSIS — N18.30 CHRONIC RENAL FAILURE, STAGE 3 (MODERATE), UNSPECIFIED WHETHER STAGE 3A OR 3B CKD (HCC): ICD-10-CM

## 2023-07-06 LAB
ALBUMIN SERPL-MCNC: 3.7 G/DL (ref 3.4–5)
ANION GAP SERPL CALC-SCNC: 8 MMOL/L (ref 0–18)
BILIRUB UR QL: NEGATIVE
BUN BLD-MCNC: 23 MG/DL (ref 7–18)
BUN/CREAT SERPL: 15.2 (ref 10–20)
CALCIUM BLD-MCNC: 9 MG/DL (ref 8.5–10.1)
CHLORIDE SERPL-SCNC: 110 MMOL/L (ref 98–112)
CLARITY UR: CLEAR
CO2 SERPL-SCNC: 27 MMOL/L (ref 21–32)
CREAT BLD-MCNC: 1.51 MG/DL
CREAT UR-SCNC: 23.4 MG/DL
GFR SERPLBLD BASED ON 1.73 SQ M-ARVRAT: 35 ML/MIN/1.73M2 (ref 60–?)
GLUCOSE BLD-MCNC: 106 MG/DL (ref 70–99)
GLUCOSE UR-MCNC: 300 MG/DL
HGB UR QL STRIP.AUTO: NEGATIVE
KETONES UR-MCNC: NEGATIVE MG/DL
LEUKOCYTE ESTERASE UR QL STRIP.AUTO: 250
MICROALBUMIN UR-MCNC: 19.1 MG/DL
MICROALBUMIN/CREAT 24H UR-RTO: 816.2 UG/MG (ref ?–30)
OSMOLALITY SERPL CALC.SUM OF ELEC: 304 MOSM/KG (ref 275–295)
PH UR: 5 [PH] (ref 5–8)
PHOSPHATE SERPL-MCNC: 3.4 MG/DL (ref 2.5–4.9)
POTASSIUM SERPL-SCNC: 4.4 MMOL/L (ref 3.5–5.1)
PROT UR-MCNC: 20 MG/DL
PTH-INTACT SERPL-MCNC: 116 PG/ML (ref 18.5–88)
SODIUM SERPL-SCNC: 145 MMOL/L (ref 136–145)
SP GR UR STRIP: 1 (ref 1–1.03)
UROBILINOGEN UR STRIP-ACNC: NORMAL

## 2023-07-06 PROCEDURE — 82570 ASSAY OF URINE CREATININE: CPT

## 2023-07-06 PROCEDURE — 80069 RENAL FUNCTION PANEL: CPT

## 2023-07-06 PROCEDURE — 36415 COLL VENOUS BLD VENIPUNCTURE: CPT

## 2023-07-06 PROCEDURE — 83970 ASSAY OF PARATHORMONE: CPT

## 2023-07-06 PROCEDURE — 81001 URINALYSIS AUTO W/SCOPE: CPT

## 2023-07-06 PROCEDURE — 82043 UR ALBUMIN QUANTITATIVE: CPT

## 2023-07-10 ENCOUNTER — OFFICE VISIT (OUTPATIENT)
Facility: CLINIC | Age: 78
End: 2023-07-10

## 2023-07-10 VITALS
SYSTOLIC BLOOD PRESSURE: 140 MMHG | HEART RATE: 68 BPM | HEIGHT: 62 IN | WEIGHT: 158.81 LBS | DIASTOLIC BLOOD PRESSURE: 60 MMHG | BODY MASS INDEX: 29.22 KG/M2

## 2023-07-10 DIAGNOSIS — I50.22 CHRONIC SYSTOLIC HEART FAILURE (HCC): ICD-10-CM

## 2023-07-10 DIAGNOSIS — C64.1 RENAL CELL CARCINOMA OF RIGHT KIDNEY (HCC): ICD-10-CM

## 2023-07-10 DIAGNOSIS — E11.21 DIABETIC GLOMERULOPATHY (HCC): ICD-10-CM

## 2023-07-10 DIAGNOSIS — N18.4 HYPERTENSIVE KIDNEY DISEASE WITH CHRONIC KIDNEY DISEASE STAGE IV (HCC): ICD-10-CM

## 2023-07-10 DIAGNOSIS — Z90.5 SOLITARY KIDNEY, ACQUIRED: ICD-10-CM

## 2023-07-10 DIAGNOSIS — I12.9 HYPERTENSIVE KIDNEY DISEASE WITH CHRONIC KIDNEY DISEASE STAGE IV (HCC): ICD-10-CM

## 2023-07-10 DIAGNOSIS — N18.4 STAGE 4 CHRONIC KIDNEY DISEASE (HCC): Primary | ICD-10-CM

## 2023-07-10 PROCEDURE — 99214 OFFICE O/P EST MOD 30 MIN: CPT | Performed by: INTERNAL MEDICINE

## 2023-07-10 NOTE — PROGRESS NOTES
Progress Note     Kaleb Portillo    The patient is here for follow-up. She was admitted to the hospital back in May which time she was admitted for shortness of breath and edema. She had a new cardiomyopathy diagnosed with her ejection fraction decreased to 30 to 35%. She was given IV fluids and the left heart cath was done. She had some mild OPHELIA which recovered to a creatinine of 1.8 on discharge    She is having trouble with her back - just has a block done on her back    Here with son Hiren Marmolejo. HISTORY:  Past Medical History:   Diagnosis Date    Adenomatous colon polyp 2013    colonoscopy 3/13 Dr. Kaleb Pineda diverticulosis and int hem. Allergic conjunctivitis 2014    Dr. Herve Daniel    CAD (coronary artery disease) 05/2022    Cardiac cath 5/27/22-Dr. Swenson-stents placed to LAD and diagonal.     Diabetes ([facility] 2010    Diabetes mellitus ([facility]     Diverticulosis 2001    Essential hypertension 2000    Glaucoma     Hyperlipidemia     Hypertension, essential     Internal hemorrhoids 2001 and 2013    Ischemic cardiomyopathy 05/2022    LBBB (left bundle branch block) 2014    nl stress echo 6/14-Dr. Jordan Marsh    Osteopenia 2001    f/u dexa nl in 9/14    Renal cell carcinoma ([facility] 2020     robotic asissted R radical nephrectomy 9/2/20 Dr Mo Montiel. Type 2 diabetes mellitus [facility])       Past Surgical History:   Procedure Laterality Date    CATARACT EXTRACTION Bilateral 2014    Dr. Vignesh Shea    at [facility] N/A 3/9/2018    Procedure: COLONOSCOPY, POSSIBLE BIOPSY, POSSIBLE POLYPECTOMY 60218;  Surgeon: Cecile Granado MD;  Location: [address] [place] [address] LOCALIZATION WIRE 1 SITE LEFT (DDD=[id number])  1998    NEPHRECTOMY Right 09/02/2020     robotic asissted R radical nephrectomy 9/2/20 Dr Mo Montiel.            Medications (Active prior to today's visit):  Current Outpatient Medications   Medication Sig Dispense Refill    furosemide 20 MG Oral Tab Take 1 tablet (20 mg total) by mouth daily. 5/4 restarted 90 tablet 3    isosorbide mononitrate ER 30 MG Oral Tablet 24 Hr Take 1 tablet (30 mg total) by mouth daily. 90 tablet 3    rosuvastatin 20 MG Oral Tab Take 1 tablet (20 mg total) by mouth nightly. 90 tablet 0    insulin detemir (LEVEMIR FLEXPEN) 100 UNIT/ML Subcutaneous Solution Pen-injector Inject 28 Units into the skin nightly. 15 mL 5    sacubitril-valsartan  MG Oral Tab Take 1 tablet by mouth 2 (two) times daily. empagliflozin (JARDIANCE) 10 MG Oral Tab Take 1 tablet (10 mg total) by mouth daily. 90 tablet 3    amLODIPine 5 MG Oral Tab Take 1 tablet (5 mg total) by mouth daily. 90 tablet 3    hydrALAZINE 50 MG Oral Tab Take 1 tablet (50 mg total) by mouth in the morning, at noon, and at bedtime. 270 tablet 3    metoprolol succinate ER 50 MG Oral Tablet 24 Hr Take 1 tablet (50 mg total) by mouth 2 (two) times a day. 180 tablet 3    Insulin Aspart Pen (NOVOLOG FLEXPEN) 100 UNIT/ML Subcutaneous Solution Pen-injector Inject if needed before meals. 15 mL 1    RHOPRESSA 0.02 % Ophthalmic Solution       aspirin 81 MG Oral Tab EC Take 1 tablet (81 mg total) by mouth daily. 0    LUMIGAN 0.01 % Ophthalmic Solution Place 1 drop into both eyes daily. Glucose Blood (FREESTYLE LITE TEST) In Vitro Strip 1 strip by In Vitro route 3 (three) times daily. 300 strip 3    Insulin Pen Needle (BD PEN NEEDLE MINI U/F) 31G X 5 MM Does not apply Misc Use three times daily with insulin.  300 each 3       Allergies:    Bactrim [Sulfametho*    NAUSEA AND VOMITING    Comment:Nausea and vomiting in 7/2018  Atorvastatin                Comment:Other reaction(s): ears ring      ROS:     Constitutional:  Negative for decreased activity, fever, irritability and lethargy  ENMT:  Negative for ear drainage, hearing loss and nasal drainage  Eyes:  Negative for eye discharge and vision loss  Cardiovascular:  Negative for chest pain, sob  Respiratory:  Negative for cough, dyspnea and wheezing  Gastrointestinal:  Negative for abdominal pain, constipation  Genitourinary:  Negative for dysuria and hematuria  Endocrine:  Negative for abnormal sleep patterns  Hema/Lymph:  Negative for easy bleeding and easy bruising  Integumentary:  Negative for pruritus and rash  Musculoskeletal:  Negative for bone/joint symptoms  Neurological:  Negative for gait disturbance  Psychiatric:  Negative for inappropriate interaction and psychiatric symptoms       07/10/23  1052   BP: 140/60   Pulse:        PHYSICAL EXAM:   Constitutional: appears well hydrated alert and responsive   Head/Face: normocephalic  Eyes/Vision: normal extraocular motion is intact  Nose/Mouth/Throat:mucous membranes are moist   Neck/Thyroid: neck is supple without adenopathy  Lymphatic: no abnormal cervical, supraclavicular adenopathy is noted  Respiratory:  lungs are clear to auscultation bilaterally  Cardiovascular: regular rate and rhythm   Abdomen: soft, non-tender, non-distended, BS normal  Skin/Hair: no unusual rashes present, no abnormal bruising noted  Musculoskeletal: no deformities  Extremities: no edema  Neurological:  Grossly normal       Lab Results   Component Value Date     (H) 07/06/2023     07/06/2023    K 4.4 07/06/2023     07/06/2023    CO2 27.0 07/06/2023    ANIONGAP 8 07/06/2023    BUN 23 (H) 07/06/2023    CREATSERUM 1.51 (H) 07/06/2023    CA 9.0 07/06/2023    OSMOCALC 304 (H) 07/06/2023    EGFRCR 35 (L) 07/06/2023    ALB 3.7 07/06/2023    PHOS 3.4 07/06/2023         ASSESSMENT/PLAN:   Assessment   1. Stage 4 chronic kidney disease (HCC)  EGFR is stable. Continue to monitor it. 2. Renal cell carcinoma of right kidney [facility])  He is status post right nephrectomy and follows up with oncology as well as urology    3. Solitary kidney, acquired  As above    4. Hypertensive kidney disease with chronic kidney disease stage IV (HCC)  Continues on amlodipine, hydralazine, Imdur, metoprolol, and Entresto    5. Diabetic glomerulopathy ([facility]  She is on Jardiance and insulin    6. Chronic systolic heart failure ([facility]  The patient follows up with Dr Eliazar Sepulveda as well as the heart failure clinic               Orders This Visit:  No orders of the defined types were placed in this encounter. Meds This Visit:  Requested Prescriptions      No prescriptions requested or ordered in this encounter       Imaging & Referrals:  None     7/10/2023   Lupillo Foster MD    Return in about 6 months (around 1/10/2024).

## 2023-07-21 ENCOUNTER — OFFICE VISIT (OUTPATIENT)
Dept: PAIN CLINIC | Facility: HOSPITAL | Age: 78
End: 2023-07-21
Attending: ANESTHESIOLOGY
Payer: MEDICARE

## 2023-07-21 VITALS
SYSTOLIC BLOOD PRESSURE: 155 MMHG | BODY MASS INDEX: 29.08 KG/M2 | HEART RATE: 60 BPM | HEIGHT: 62 IN | WEIGHT: 158 LBS | DIASTOLIC BLOOD PRESSURE: 70 MMHG | RESPIRATION RATE: 18 BRPM

## 2023-07-21 DIAGNOSIS — M51.26 LUMBAR DISC HERNIATION: Primary | ICD-10-CM

## 2023-07-21 PROCEDURE — 99211 OFF/OP EST MAY X REQ PHY/QHP: CPT

## 2023-07-21 NOTE — CHRONIC PAIN
Follow-up Note    Ludy Mcbride is a 76year old old female, originally referred to the pain clinic by Dr. Stewart Helms MD, with history of Lumbar disc herniation  (primary encounter diagnosis) returns to the clinic for follow up. Patient is complaining of 0 pain while sitting if she is walking pain increases to 10 out of 10. Patient had medial branch nerve blocks which did not give her any pain relief. No new symptoms. Patient has no change in bowel/bladder function. Patient had multiple injections including epidural SI joint and medial branch nerve blocks which did not work. Patient tried Tylenol which is its not helping her. ALLERGIES:    Bactrim [Sulfametho*    NAUSEA AND VOMITING    Comment:Nausea and vomiting in 7/2018  Atorvastatin                Comment:Other reaction(s): ears ring    MEDICATION LIST:  Current Outpatient Medications   Medication Sig Dispense Refill    furosemide 20 MG Oral Tab Take 1 tablet (20 mg total) by mouth daily. 5/4 restarted 90 tablet 3    isosorbide mononitrate ER 30 MG Oral Tablet 24 Hr Take 1 tablet (30 mg total) by mouth daily. 90 tablet 3    rosuvastatin 20 MG Oral Tab Take 1 tablet (20 mg total) by mouth nightly. 90 tablet 0    insulin detemir (LEVEMIR FLEXPEN) 100 UNIT/ML Subcutaneous Solution Pen-injector Inject 28 Units into the skin nightly. 15 mL 5    Glucose Blood (FREESTYLE LITE TEST) In Vitro Strip 1 strip by In Vitro route 3 (three) times daily. 300 strip 3    sacubitril-valsartan  MG Oral Tab Take 1 tablet by mouth 2 (two) times daily. empagliflozin (JARDIANCE) 10 MG Oral Tab Take 1 tablet (10 mg total) by mouth daily. 90 tablet 3    Insulin Pen Needle (BD PEN NEEDLE MINI U/F) 31G X 5 MM Does not apply Misc Use three times daily with insulin. 300 each 3    amLODIPine 5 MG Oral Tab Take 1 tablet (5 mg total) by mouth daily. 90 tablet 3    hydrALAZINE 50 MG Oral Tab Take 1 tablet (50 mg total) by mouth in the morning, at noon, and at bedtime. 270 tablet 3    metoprolol succinate ER 50 MG Oral Tablet 24 Hr Take 1 tablet (50 mg total) by mouth 2 (two) times a day. 180 tablet 3    Insulin Aspart Pen (NOVOLOG FLEXPEN) 100 UNIT/ML Subcutaneous Solution Pen-injector Inject if needed before meals. 15 mL 1    RHOPRESSA 0.02 % Ophthalmic Solution       aspirin 81 MG Oral Tab EC Take 1 tablet (81 mg total) by mouth daily. 0    LUMIGAN 0.01 % Ophthalmic Solution Place 1 drop into both eyes daily. REVIEW OF SYSTEMS:   General: no weight change, change in appetite, thirst or fever  HEENT: no headache or blurred vision  Cardiopulmonary: no chest pain, palpitations, or shortness of breath  GI: no anorexia, nausea or vomiting, or bowel incontinence   : no dysuria, or pyuria. Endo: no goiter, lethargy, or heat/cold intolerance  Heme/Onc: no pallor, bruising, or bleeding.     Musculoskeletal:  no new problems  Neuro:  no new problems  Psych:  no new problems    MEDICAL HISTORY:  Patient Active Problem List:     Essential hypertension     Hyperlipidemia     Type 2 diabetes mellitus without complication, with long-term current use of insulin (HCC)     Elevated coronary artery calcium score     Stage 3 chronic kidney disease ([address]     S/p nephrectomy, right     Renal cell carcinoma of right kidney (HCC)     Acute on chronic congestive heart failure ([address]     Stented coronary artery     Ischemic cardiomyopathy     Acute on chronic combined systolic and diastolic HF (heart failure), NYHA class 3 ([address]     Coronary artery disease involving native coronary artery of native heart without angina pectoris     Chronic combined systolic and diastolic CHF, NYHA class 2 (HCC)     Chronic midline low back pain without sciatica     Sacroiliac joint pain     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 Wood Pin diverticulosis and int hem. Allergic conjunctivitis     Dr. Moi Ordoñez    CAD (coronary artery disease) 2022    Cardiac cath 22-Dr. Swenson-stents placed to LAD and diagonal.     Diabetes ([facility]     Diabetes mellitus ([facility]     Diverticulosis     Essential hypertension 2000    Glaucoma     Hyperlipidemia     Hypertension, essential     Internal hemorrhoids  and     Ischemic cardiomyopathy 2022    LBBB (left bundle branch block)     nl stress echo -Dr. Malena Banuelos    Osteopenia     f/u dexa nl in     Renal cell carcinoma ([facility]      robotic asissted R radical nephrectomy 20 Dr Bernie Owen. Type 2 diabetes mellitus ([facility]        SURGICAL HISTORY:  Past Surgical History:   Procedure Laterality Date    CATARACT EXTRACTION Bilateral     Dr. Frances Carlos    at [facility]. N/A 3/9/2018    Procedure: COLONOSCOPY, POSSIBLE BIOPSY, POSSIBLE POLYPECTOMY 30937;  Surgeon: Nicole Vega MD;  Location: [address] [place] [address] LOCALIZATION WIRE 1 SITE LEFT (HFP=[id number])      NEPHRECTOMY Right 2020     robotic asissted R radical nephrectomy 20 Dr Bernie Owen.        FAMILY HISTORY:  Family History   Problem Relation Age of Onset    Stroke Father          age [de-identified]    Other (unknown cause of death) Mother          age 80    Diabetes Mother     Hypertension Brother     Other (1 brother) Other         living and well    Other (1 son, 1 daughter) Other     Breast Cancer Neg        SOCIAL HISTORY:  Social History    Socioeconomic History      Marital status:       Spouse name: Not on file      Number of children: Not on file      Years of education: Not on file      Highest education level: Not on file    Occupational History      Not on file    Tobacco Use      Smoking status: Former        Packs/day: 1.00        Years: 20.00        Pack years: 20        Types: Cigarettes        Quit date: 1993        Years since quittin.9      Smokeless tobacco: Never    Vaping Use      Vaping Use: Never used    Substance and Sexual Activity      Alcohol use: No      Drug use: No      Sexual activity: Not on file    Other Topics      Concerns:         Service: Not Asked        Blood Transfusions: Not Asked        Caffeine Concern: No        Occupational Exposure: Not Asked        Hobby Hazards: Not Asked        Sleep Concern: Not Asked        Stress Concern: Not Asked        Weight Concern: Not Asked        Special Diet: Not Asked        Back Care: Not Asked        Exercise: No        Bike Helmet: Not Asked        Seat Belt: Not Asked        Self-Exams: Not Asked    Social History Narrative      The patient uses the following assistive device(s):  rolling walker. The patient does live in a home with stairs. Social Determinants of Health  Financial Resource Strain: Not on file  Food Insecurity: Not on file  Transportation Needs: Not on file  Physical Activity: Not on file  Stress: Not on file  Social Connections: Not on file  Housing Stability: Not on file  PHYSICAL EXAMINATION:   23  0911   BP: 155/70   Pulse: 60   Resp: 18      General: Alert and oriented x3, NAD, appears stated age, appropriate disposition and demeanor, answers questions appropriately   Head: normocephalic, atraumatic  Eyes: anicteric; no injection  Ears: normal;  no discharge  Nose: externally grossly within normal limits, no unusual discharge or rhinorrhea   Throat: lips grossly within normal limits by visual exam externally  Neck: supple, trachea midline, no obvious JVD  Gait: Walking forward bended small steps needs to help support. Spine: Flattened lumbar lordosis. ROM:   Lumbar Spine: Comfortable sitting in a wheelchair  MOTOR EXAMINATION:  Lower Extremities: Normal bilaterally  REFLEXES:  Lower Extremities: Normal bilaterally  SENSATION (light touch/pinprick/temperature):    Lower Extremities: Normal bilaterally  SLR: Positive on the right at 60 degrees  SIJ tenderness: Negative bilateral  Nora's test: Negative bilaterally  TPs: Not identified    IMAGING:  MRI of lumbar spine  LUMBAR DISC LEVELS:  L1-L2: No significant disc/facet abnormality, spinal stenosis, or foraminal stenosis. L2-L3: Diffuse disc bulge with ligamentum flavum redundancy and minor bilateral facet arthropathy. No substantial resultant neural compromise. L3-L4: Diffuse disc bulge with superimposed 11 x 9 mm right subarticular zone disc extrusion in addition to ligamentum flavum redundancy and mild bilateral facet arthropathy. Moderate to severe right lateral recess stenosis with effacement of the  traversing right L4 nerve root. Mild spinal canal stenosis. Mild right neural foraminal stenosis. L4-L5: Diffuse disc bulge with mild bilateral facet arthropathy. No substantial resultant neural compromise. L5-S1: Disc and facet related degeneration, which results in mild left greater than right neural foraminal stenosis with no substantial spinal canal or lateral recess compromise. Impression   CONCLUSION:     1. Multilevel degenerative changes of the lumbar spine as detailed. Notable levels as follows:     2. L3-L4:  There is an 11 x 9 mm right subarticular zone disc extrusion, which results in moderate to severe right lateral recess stenosis and effacement of the traversing right L4 nerve root; please correlate for right L4 radiculopathy. Additional mild   spinal canal and mild right neural foraminal stenosis. 3. L5-S1:  Mild left and minor right neural foraminal stenosis. 4. Right kidney is not identified and likely surgically absent. 5. Lesser incidental findings as above.        elm-remote     Dictated by (CST): Roberto Hartmann MD on 2/16/2023 at 7:39 AM          ASSESSMENT AND PLAN:    Roxana Age is a 76year old  female, with   Lumbar disc herniation  (primary encounter diagnosis).   No injection would recommend at this time. Patient had no improvement. Would recommend to talk to Dr. Nvaarro Lozada regarding operative treatment options. If no surgery recommended would consider spinal cord stimulator trial.    Pt will return to clinic as needed. Conservative vs. Interventional pain treatment options were discussed at length including pharmacotherapy (eg. Anti- inflammatories, muscle relaxants, neuropathic medications, oral steroids, analgesics), injections, and further testing. Risks and benefits of all options were discussed to patients satisfaction. All questions were answered. Patient agreeable to treatment plan. Greater than 50% of the time was spent with counseling (nature of discussion centered around pain, therapy, and treatment options), face to face time, time spent reviewing data, obtaining patient information and discussing the care with the patients health care providers.      Total Time: 25 minutes

## 2023-07-21 NOTE — PROGRESS NOTES
7/21/2023-presents in [facility] [address] accompanied by her son Lala Brannon, and her daughter; f/u POST DX BILAT MBBL3/4 ALA-GUREVICIUS; pt reports 0% improvement; this is her 3rd injection and she report none of them helped her; her pain is 0/10 when sitting; BUT, 10/10 when ambulating; seen by Dr. Sabas Hardy; refer to documentation for the plan of care.

## 2023-07-24 RX ORDER — HYDRALAZINE HYDROCHLORIDE 50 MG/1
50 TABLET, FILM COATED ORAL 3 TIMES DAILY
Qty: 270 TABLET | Refills: 3 | Status: SHIPPED | OUTPATIENT
Start: 2023-07-24

## 2023-08-18 ENCOUNTER — LAB ENCOUNTER (OUTPATIENT)
Dept: LAB | Facility: HOSPITAL | Age: 78
End: 2023-08-18
Attending: INTERNAL MEDICINE
Payer: MEDICARE

## 2023-08-18 DIAGNOSIS — I25.5 ISCHEMIC CARDIOMYOPATHY: ICD-10-CM

## 2023-08-18 DIAGNOSIS — E78.2 HYPERLIPIDEMIA, MIXED: ICD-10-CM

## 2023-08-18 DIAGNOSIS — E55.9 VITAMIN D DEFICIENCY: ICD-10-CM

## 2023-08-18 DIAGNOSIS — Z13.29 SCREENING FOR THYROID DISORDER: ICD-10-CM

## 2023-08-18 DIAGNOSIS — E11.9 TYPE 2 DIABETES MELLITUS WITHOUT COMPLICATION, WITHOUT LONG-TERM CURRENT USE OF INSULIN (HCC): ICD-10-CM

## 2023-08-18 DIAGNOSIS — Z13.0 SCREENING FOR DEFICIENCY ANEMIA: ICD-10-CM

## 2023-08-18 DIAGNOSIS — Z13.89 SCREENING FOR NEPHROPATHY: ICD-10-CM

## 2023-08-18 LAB
ALBUMIN SERPL-MCNC: 3.4 G/DL (ref 3.4–5)
ALBUMIN/GLOB SERPL: 1.2 {RATIO} (ref 1–2)
ALP LIVER SERPL-CCNC: 61 U/L
ALT SERPL-CCNC: 14 U/L
ANION GAP SERPL CALC-SCNC: 12 MMOL/L (ref 0–18)
AST SERPL-CCNC: 14 U/L (ref 15–37)
BASOPHILS # BLD AUTO: 0.04 X10(3) UL (ref 0–0.2)
BASOPHILS NFR BLD AUTO: 0.5 %
BILIRUB SERPL-MCNC: 0.5 MG/DL (ref 0.1–2)
BUN BLD-MCNC: 35 MG/DL (ref 7–18)
BUN/CREAT SERPL: 19.8 (ref 10–20)
CALCIUM BLD-MCNC: 8.9 MG/DL (ref 8.5–10.1)
CHLORIDE SERPL-SCNC: 110 MMOL/L (ref 98–112)
CHOLEST SERPL-MCNC: 112 MG/DL (ref ?–200)
CO2 SERPL-SCNC: 19 MMOL/L (ref 21–32)
CREAT BLD-MCNC: 1.77 MG/DL
CREAT UR-SCNC: 61.7 MG/DL
DEPRECATED RDW RBC AUTO: 43.5 FL (ref 35.1–46.3)
EGFRCR SERPLBLD CKD-EPI 2021: 29 ML/MIN/1.73M2 (ref 60–?)
EOSINOPHIL # BLD AUTO: 0.38 X10(3) UL (ref 0–0.7)
EOSINOPHIL NFR BLD AUTO: 4.5 %
ERYTHROCYTE [DISTWIDTH] IN BLOOD BY AUTOMATED COUNT: 12.7 % (ref 11–15)
EST. AVERAGE GLUCOSE BLD GHB EST-MCNC: 154 MG/DL (ref 68–126)
FASTING PATIENT LIPID ANSWER: YES
FASTING STATUS PATIENT QL REPORTED: YES
GLOBULIN PLAS-MCNC: 2.8 G/DL (ref 2.8–4.4)
GLUCOSE BLD-MCNC: 162 MG/DL (ref 70–99)
HBA1C MFR BLD: 7 % (ref ?–5.7)
HCT VFR BLD AUTO: 38.7 %
HDLC SERPL-MCNC: 45 MG/DL (ref 40–59)
HGB BLD-MCNC: 12.6 G/DL
IMM GRANULOCYTES # BLD AUTO: 0.02 X10(3) UL (ref 0–1)
IMM GRANULOCYTES NFR BLD: 0.2 %
LDLC SERPL CALC-MCNC: 30 MG/DL (ref ?–100)
LYMPHOCYTES # BLD AUTO: 0.92 X10(3) UL (ref 1–4)
LYMPHOCYTES NFR BLD AUTO: 10.9 %
MCH RBC QN AUTO: 30.2 PG (ref 26–34)
MCHC RBC AUTO-ENTMCNC: 32.6 G/DL (ref 31–37)
MCV RBC AUTO: 92.8 FL
MICROALBUMIN UR-MCNC: 27.7 MG/DL
MICROALBUMIN/CREAT 24H UR-RTO: 448.9 UG/MG (ref ?–30)
MONOCYTES # BLD AUTO: 0.65 X10(3) UL (ref 0.1–1)
MONOCYTES NFR BLD AUTO: 7.7 %
NEUTROPHILS # BLD AUTO: 6.45 X10 (3) UL (ref 1.5–7.7)
NEUTROPHILS # BLD AUTO: 6.45 X10(3) UL (ref 1.5–7.7)
NEUTROPHILS NFR BLD AUTO: 76.2 %
NONHDLC SERPL-MCNC: 67 MG/DL (ref ?–130)
OSMOLALITY SERPL CALC.SUM OF ELEC: 304 MOSM/KG (ref 275–295)
PLATELET # BLD AUTO: 228 10(3)UL (ref 150–450)
POTASSIUM SERPL-SCNC: 4.1 MMOL/L (ref 3.5–5.1)
PROT SERPL-MCNC: 6.2 G/DL (ref 6.4–8.2)
RBC # BLD AUTO: 4.17 X10(6)UL
SODIUM SERPL-SCNC: 141 MMOL/L (ref 136–145)
TRIGL SERPL-MCNC: 245 MG/DL (ref 30–149)
TSI SER-ACNC: 1.9 MIU/ML (ref 0.36–3.74)
VIT D+METAB SERPL-MCNC: 20.6 NG/ML (ref 30–100)
VLDLC SERPL CALC-MCNC: 33 MG/DL (ref 0–30)
WBC # BLD AUTO: 8.5 X10(3) UL (ref 4–11)

## 2023-08-18 PROCEDURE — 83036 HEMOGLOBIN GLYCOSYLATED A1C: CPT

## 2023-08-18 PROCEDURE — 80053 COMPREHEN METABOLIC PANEL: CPT

## 2023-08-18 PROCEDURE — 82043 UR ALBUMIN QUANTITATIVE: CPT

## 2023-08-18 PROCEDURE — 82570 ASSAY OF URINE CREATININE: CPT

## 2023-08-18 PROCEDURE — 82306 VITAMIN D 25 HYDROXY: CPT

## 2023-08-18 PROCEDURE — 84443 ASSAY THYROID STIM HORMONE: CPT

## 2023-08-18 PROCEDURE — 85025 COMPLETE CBC W/AUTO DIFF WBC: CPT

## 2023-08-18 PROCEDURE — 80061 LIPID PANEL: CPT

## 2023-08-18 PROCEDURE — 36415 COLL VENOUS BLD VENIPUNCTURE: CPT

## 2023-08-19 NOTE — PATIENT INSTRUCTIONS
- You were seen in clinic for regular annual check-up. We did review your most recent set of blood work    Your sugars remain well controlled  Triglycerides are slightly high, but the rest of your cholesterol panel is well controlled  Lets continue moderating carbohydrate intake, slight decrease in animal products that can increase triglycerides   Vitamin D is levels are slightly low, would recommend over-the-counter vitamin D3/cholecalciferol 2000 units once a day    - We did review your recent pain clinic visits With minimal relief from injections to the back. Operative management was recommended, we can consider having you see Dr. Tahir Guerra of neurosurgery    In the meantime, we recommend:    -Acetaminophen 500-650 mg every 4-6 hours as needed for pain relief  -Avoid NSAIDs such as ibuprofen/Advil as this could affect the kidneys negatively  -For more severe pain, notify us as we could consider alternative medications  - Ongoing physical therapy can be considered    -We are happy to hear that you are cardiac status remains controlled with Dr. Kate Garcia  - Colon cancer screening is recommended. We discussed colonoscopy is preferred, but fit test is an alternative we can consider.    -Please continue checking your blood pressures at home and notify us if they are increasing   - please continue checking your blood sugars at home and notify us if they are increasing  - Please continue following up with Dr. Royer Degroot for diabetic eye examinations  - Vaccines you may be due for: COVID dose #7, We recommended checking with your insurance for coverage of Shingrix, a 2-dose shingles vaccine that can be obtained at the pharmacy if there is adequate coverage through your insurance plan. - Please continue to eat a varied diet including recommended servings of vegetables, fruits, and low fat dairy.  Minimize high saturated fats (such as fast foods) and high sugar intake (such as soda)  - We recommend 150 minutes of moderate intensity exercise (brisk walking, swimming) weekly to maintain your current weight. Targeted weight loss will require more vigorous exercise or more than 150 minutes/week. Return to clinic in 3-4 months for follow-up    [address]   Tests on this list are recommended by your physician but may not be covered, or covered at this frequency, by your insurer. Please check with your insurance carrier before scheduling to verify coverage.    PREVENTATIVE SERVICES FREQUENCY &  COVERAGE DETAILS LAST COMPLETION DATE   Diabetes Screening    Fasting Blood Sugar /  Glucose    One screening every 12 months if never tested or if previously tested but not diagnosed with pre-diabetes   One screening every 6 months if diagnosed with pre-diabetes Lab Results   Component Value Date     (H) 08/18/2023        Cardiovascular Disease Screening    Lipid Panel  Cholesterol  Lipoprotein (HDL)  Triglycerides Covered every 5 years for all Medicare beneficiaries without apparent signs or symptoms of cardiovascular disease Lab Results   Component Value Date    CHOLEST 112 08/18/2023    HDL 45 08/18/2023    LDL 30 08/18/2023    TRIG 245 (H) 08/18/2023         Electrocardiogram (EKG)   Covered if needed at Welcome to Medicare, and non-screening if indicated for medical reasons 05/25/2022      Ultrasound Screening for Abdominal Aortic Aneurysm (AAA) Covered once in a lifetime for one of the following risk factors    Men who are 73-68 years old and have ever smoked    Anyone with a family history -     Colorectal Cancer Screening  Covered for ages 52-80; only need ONE of the following:    Colonoscopy   Covered every 10 years    Covered every 2 years if patient is at high risk or previous colonoscopy was abnormal 03/09/2018    Colorectal Cancer Screening due on 03/09/2021    Flexible Sigmoidoscopy   Covered every 4 years -    Fecal Occult Blood Test Covered annually -   Bone Density Screening    Bone density screening    Covered every 2 years after age 72 if diagnosed with risk of osteoporosis or estrogen deficiency. Covered yearly for long-term glucocorticoid medication use (Steroids) Last Dexa Scan:    XR DEXA BONE DENSITOMETRY (CPT=77080) 03/05/2018      No recommendations at this time   Pap and Pelvic    Pap   Covered every 2 years for women at normal risk; Annually if at high risk -  No recommendations at this time    Chlamydia Annually if high risk -  No recommendations at this time   Screening Mammogram    Mammogram     Recommend annually for all female patients aged 36 and older    One baseline mammogram covered for patients aged 32-38 -    No recommendations at this time    Immunizations    Influenza Covered once per flu season  Please get every year 11/18/2022  No recommendations at this time    Pneumococcal Each vaccine ([id number] & [id number]) covered once after 72 Prevnar 13: 04/24/2015    [id number]: 07/27/2013     No recommendations at this time    Hepatitis B One screening covered for patients with certain risk factors   -  No recommendations at this time    Tetanus Toxoid Not covered by Medicare Part B unless medically necessary (cut with metal); may be covered with your pharmacy prescription benefits -    Tetanus, Diptheria and Pertusis TD and TDaP Not covered by Medicare Part B -  No recommendations at this time    Zoster Not covered by Medicare Part B; may be covered with your pharmacy  prescription benefits 09/18/2013  Zoster Vaccines(1 of 2) due on 11/13/2013     Diabetes      Hemoglobin A1C Annually; if last result is elevated, may be asked to retest more frequently.     Medicare covers every 3 months Lab Results   Component Value Date     (H) 08/18/2023    A1C 7.0 (H) 08/18/2023       No recommendations at this time    Creat/alb ratio Annually Lab Results   Component Value Date    MICROALBCREA 448.9 (H) 08/18/2023       LDL Annually Lab Results   Component Value Date    LDL 30 08/18/2023       Dilated Eye Exam Annually -     Annual Monitoring of Persistent Medications (ACE/ARB, digoxin diuretics, anticonvulsants)    Potassium Annually Lab Results   Component Value Date    K 4.1 08/18/2023         Creatinine   Annually Lab Results   Component Value Date    CREATSERUM 1.77 (H) 08/18/2023         BUN Annually Lab Results   Component Value Date    BUN 35 (H) 08/18/2023       Drug Serum Conc Annually No results found for: DIGOXIN, DIG, VALP

## 2023-08-21 ENCOUNTER — OFFICE VISIT (OUTPATIENT)
Dept: INTERNAL MEDICINE CLINIC | Facility: CLINIC | Age: 78
End: 2023-08-21

## 2023-08-21 VITALS
OXYGEN SATURATION: 97 % | SYSTOLIC BLOOD PRESSURE: 138 MMHG | WEIGHT: 161 LBS | DIASTOLIC BLOOD PRESSURE: 62 MMHG | HEART RATE: 80 BPM | HEIGHT: 62 IN | BODY MASS INDEX: 29.63 KG/M2 | TEMPERATURE: 98 F

## 2023-08-21 DIAGNOSIS — I10 ESSENTIAL HYPERTENSION: ICD-10-CM

## 2023-08-21 DIAGNOSIS — Z90.5 S/P NEPHRECTOMY: ICD-10-CM

## 2023-08-21 DIAGNOSIS — Z00.00 ENCOUNTER FOR ANNUAL HEALTH EXAMINATION: ICD-10-CM

## 2023-08-21 DIAGNOSIS — I25.5 ISCHEMIC CARDIOMYOPATHY: ICD-10-CM

## 2023-08-21 DIAGNOSIS — Z00.00 ANNUAL PHYSICAL EXAM: Primary | ICD-10-CM

## 2023-08-21 DIAGNOSIS — R93.89 THICKENED ENDOMETRIUM: ICD-10-CM

## 2023-08-21 DIAGNOSIS — E55.9 VITAMIN D DEFICIENCY: ICD-10-CM

## 2023-08-21 DIAGNOSIS — M54.16 RIGHT LUMBAR RADICULOPATHY: ICD-10-CM

## 2023-08-21 DIAGNOSIS — E78.2 HYPERLIPIDEMIA, MIXED: ICD-10-CM

## 2023-08-21 DIAGNOSIS — N18.4 CKD (CHRONIC KIDNEY DISEASE), STAGE IV (HCC): ICD-10-CM

## 2023-08-21 DIAGNOSIS — Z95.5 STENTED CORONARY ARTERY: ICD-10-CM

## 2023-08-21 DIAGNOSIS — E11.9 TYPE 2 DIABETES MELLITUS WITHOUT COMPLICATION, WITHOUT LONG-TERM CURRENT USE OF INSULIN (HCC): ICD-10-CM

## 2023-08-24 ENCOUNTER — TELEPHONE (OUTPATIENT)
Facility: CLINIC | Age: 78
End: 2023-08-24

## 2023-08-24 DIAGNOSIS — N18.9 CHRONIC KIDNEY DISEASE, UNSPECIFIED CKD STAGE: Primary | ICD-10-CM

## 2023-08-24 NOTE — TELEPHONE ENCOUNTER
Pt states she is having an upcoming back surgery in the next few weeks and is wondering if she needs a clearance from Dr. Edilberto Newton please follow up

## 2023-08-24 NOTE — TELEPHONE ENCOUNTER
Dr. Diandra Cevallos, pt's surgeon told her to check with you to see if you needed anything done prior or post surgery?

## 2023-08-24 NOTE — TELEPHONE ENCOUNTER
I got the labs she did today. Her labs look just fine to proceed with surgery. I would like her to repeat another blood test to her back surgery is done. I ordered it.   Thanks

## 2023-08-31 DIAGNOSIS — I50.42 CHRONIC COMBINED SYSTOLIC AND DIASTOLIC CHF, NYHA CLASS 2 (HCC): Primary | ICD-10-CM

## 2023-09-01 ENCOUNTER — TELEPHONE (OUTPATIENT)
Dept: INTERNAL MEDICINE CLINIC | Facility: CLINIC | Age: 78
End: 2023-09-01

## 2023-09-06 ENCOUNTER — OFFICE VISIT (OUTPATIENT)
Dept: CARDIOLOGY CLINIC | Facility: HOSPITAL | Age: 78
End: 2023-09-06
Attending: INTERNAL MEDICINE
Payer: MEDICARE

## 2023-09-06 VITALS
HEART RATE: 56 BPM | SYSTOLIC BLOOD PRESSURE: 120 MMHG | BODY MASS INDEX: 30 KG/M2 | RESPIRATION RATE: 16 BRPM | DIASTOLIC BLOOD PRESSURE: 50 MMHG | WEIGHT: 162.19 LBS | OXYGEN SATURATION: 96 %

## 2023-09-06 DIAGNOSIS — N18.32 STAGE 3B CHRONIC KIDNEY DISEASE (HCC): ICD-10-CM

## 2023-09-06 DIAGNOSIS — I25.5 ISCHEMIC CARDIOMYOPATHY: ICD-10-CM

## 2023-09-06 DIAGNOSIS — Z95.5 STENTED CORONARY ARTERY: ICD-10-CM

## 2023-09-06 DIAGNOSIS — I50.42 CHRONIC COMBINED SYSTOLIC AND DIASTOLIC CHF, NYHA CLASS 2 (HCC): Primary | Chronic | ICD-10-CM

## 2023-09-06 PROCEDURE — 99214 OFFICE O/P EST MOD 30 MIN: CPT | Performed by: NURSE PRACTITIONER

## 2023-09-06 NOTE — PATIENT INSTRUCTIONS
Continue all your medications as prescribed     Call if having shortness of breath, cough, wheezing, chest pain, dizziness, lightheadedness, heart racing, palpitations, swelling, weight gain, fatigue or weakness    Call 911 with severe shortness of breath, chest pain or chest pressure not improved after 15 minutes of rest or if feeling faint,  passing out or having a fall     Weigh yourself daily in the morning before breakfast, call if gaining 3 lbs or more overnight or more than 5 lbs in one week. Follow 2000 mg sodium restricted , heart healthy diet, Keep daily fluids at 48-64 ounces per day    Follow up with Dr. Alicja Wagoner in early November     Follow up with the specialty care clinic as needed or as directed       Limit sodium to  2000 mg daily. Common high sodium foods include frozen dinners, soups (not homemade), some cereal, vegetable juice, canned vegetables, lunch meats, processed meats like hotdogs, sausage, banda, pepperoni, soy sauce, pre-packaged rice or potatoes. Please remember to read nutrition labels for sodium content.     www.healthyeating. nhlbi.nih.gov      Exercise daily as tolerated, with goal of doing moderate aerobic exercise like walking for about 30 minutes 5 days per week. Start by walking at a slow to moderate pace for 5-10 minutes 2-3 times a day. Pace your activity to prevent shortness of breath or fatigue.  Stop exercise if you develop chest pain, lightheadedness, or significant shortness of breath

## 2023-09-06 NOTE — PROGRESS NOTES
Subjective:   She is here with her son, using a w/c. States she does get up for short walks at home. Olga Walker is here and denies any complaints. Weight:  Today 162.2 lbs   Home -no  Last visit - 158.8 lbs  Labs completed : no labs   Device:  CardioMEMS Interrogation none   IV placed:  no    Patient and medication assessed. Discussed with APN. Disease management teaching completed. Reviewed AVS instructions. Patient verbalized understanding.

## 2023-09-12 ENCOUNTER — EKG ENCOUNTER (OUTPATIENT)
Dept: LAB | Facility: HOSPITAL | Age: 78
End: 2023-09-12
Attending: INTERNAL MEDICINE
Payer: MEDICARE

## 2023-09-12 ENCOUNTER — LAB ENCOUNTER (OUTPATIENT)
Dept: LAB | Facility: HOSPITAL | Age: 78
End: 2023-09-12
Attending: ORTHOPAEDIC SURGERY
Payer: MEDICARE

## 2023-09-12 ENCOUNTER — HOSPITAL ENCOUNTER (OUTPATIENT)
Dept: GENERAL RADIOLOGY | Facility: HOSPITAL | Age: 78
Discharge: HOME | End: 2023-09-12
Attending: INTERNAL MEDICINE
Payer: MEDICARE

## 2023-09-12 DIAGNOSIS — D68.9 COAGULOPATHY (HCC): ICD-10-CM

## 2023-09-12 DIAGNOSIS — Z01.818 PRE-OP TESTING: ICD-10-CM

## 2023-09-12 DIAGNOSIS — Z01.818 PREOP EXAMINATION: ICD-10-CM

## 2023-09-12 DIAGNOSIS — N18.4 CKD (CHRONIC KIDNEY DISEASE), STAGE IV (HCC): ICD-10-CM

## 2023-09-12 LAB
ALBUMIN SERPL-MCNC: 3.6 G/DL (ref 3.4–5)
ALBUMIN/GLOB SERPL: 1.3 {RATIO} (ref 1–2)
ALP LIVER SERPL-CCNC: 75 U/L
ALT SERPL-CCNC: 18 U/L
ANION GAP SERPL CALC-SCNC: 12 MMOL/L (ref 0–18)
ANTIBODY SCREEN: NEGATIVE
APTT PPP: 32.4 SECONDS (ref 23.3–35.6)
AST SERPL-CCNC: 12 U/L (ref 15–37)
ATRIAL RATE: 53 BPM
BASOPHILS # BLD AUTO: 0.04 X10(3) UL (ref 0–0.2)
BASOPHILS NFR BLD AUTO: 0.5 %
BILIRUB SERPL-MCNC: 0.6 MG/DL (ref 0.1–2)
BILIRUB UR QL: NEGATIVE
BUN BLD-MCNC: 33 MG/DL (ref 7–18)
BUN/CREAT SERPL: 18.4 (ref 10–20)
CALCIUM BLD-MCNC: 8.4 MG/DL (ref 8.5–10.1)
CHLORIDE SERPL-SCNC: 110 MMOL/L (ref 98–112)
CLARITY UR: CLEAR
CO2 SERPL-SCNC: 19 MMOL/L (ref 21–32)
COLOR UR: COLORLESS
CREAT BLD-MCNC: 1.79 MG/DL
DEPRECATED RDW RBC AUTO: 42.5 FL (ref 35.1–46.3)
EGFRCR SERPLBLD CKD-EPI 2021: 28 ML/MIN/1.73M2 (ref 60–?)
EOSINOPHIL # BLD AUTO: 0.38 X10(3) UL (ref 0–0.7)
EOSINOPHIL NFR BLD AUTO: 4.9 %
ERYTHROCYTE [DISTWIDTH] IN BLOOD BY AUTOMATED COUNT: 12.5 % (ref 11–15)
FASTING STATUS PATIENT QL REPORTED: YES
GLOBULIN PLAS-MCNC: 2.8 G/DL (ref 2.8–4.4)
GLUCOSE BLD-MCNC: 178 MG/DL (ref 70–99)
GLUCOSE UR-MCNC: 150 MG/DL
HCT VFR BLD AUTO: 38.5 %
HGB BLD-MCNC: 12.7 G/DL
HGB UR QL STRIP.AUTO: NEGATIVE
IMM GRANULOCYTES # BLD AUTO: 0.03 X10(3) UL (ref 0–1)
IMM GRANULOCYTES NFR BLD: 0.4 %
INR BLD: 1 (ref 0.85–1.16)
KETONES UR-MCNC: NEGATIVE MG/DL
LEUKOCYTE ESTERASE UR QL STRIP.AUTO: 75
LYMPHOCYTES # BLD AUTO: 0.92 X10(3) UL (ref 1–4)
LYMPHOCYTES NFR BLD AUTO: 11.8 %
MCH RBC QN AUTO: 30.5 PG (ref 26–34)
MCHC RBC AUTO-ENTMCNC: 33 G/DL (ref 31–37)
MCV RBC AUTO: 92.3 FL
MONOCYTES # BLD AUTO: 0.5 X10(3) UL (ref 0.1–1)
MONOCYTES NFR BLD AUTO: 6.4 %
MRSA DNA SPEC QL NAA+PROBE: NEGATIVE
NEUTROPHILS # BLD AUTO: 5.95 X10 (3) UL (ref 1.5–7.7)
NEUTROPHILS # BLD AUTO: 5.95 X10(3) UL (ref 1.5–7.7)
NEUTROPHILS NFR BLD AUTO: 76 %
OSMOLALITY SERPL CALC.SUM OF ELEC: 304 MOSM/KG (ref 275–295)
P AXIS: 47 DEGREES
P-R INTERVAL: 192 MS
PH UR: 5 [PH] (ref 5–8)
PLATELET # BLD AUTO: 211 10(3)UL (ref 150–450)
POTASSIUM SERPL-SCNC: 3.5 MMOL/L (ref 3.5–5.1)
PROT SERPL-MCNC: 6.4 G/DL (ref 6.4–8.2)
PROT UR-MCNC: NEGATIVE MG/DL
PROTHROMBIN TIME: 13.1 SECONDS (ref 11.6–14.8)
Q-T INTERVAL: 542 MS
QRS DURATION: 132 MS
QTC CALCULATION (BEZET): 508 MS
R AXIS: -50 DEGREES
RBC # BLD AUTO: 4.17 X10(6)UL
RH BLOOD TYPE: POSITIVE
SODIUM SERPL-SCNC: 141 MMOL/L (ref 136–145)
SP GR UR STRIP: 1.01 (ref 1–1.03)
T AXIS: 125 DEGREES
UROBILINOGEN UR STRIP-ACNC: NORMAL
VENTRICULAR RATE: 53 BPM
WBC # BLD AUTO: 7.8 X10(3) UL (ref 4–11)
WBC CLUMPS UR QL AUTO: PRESENT /HPF

## 2023-09-12 PROCEDURE — 80053 COMPREHEN METABOLIC PANEL: CPT

## 2023-09-12 PROCEDURE — 71046 X-RAY EXAM CHEST 2 VIEWS: CPT | Performed by: INTERNAL MEDICINE

## 2023-09-12 PROCEDURE — 85025 COMPLETE CBC W/AUTO DIFF WBC: CPT

## 2023-09-12 PROCEDURE — 36415 COLL VENOUS BLD VENIPUNCTURE: CPT

## 2023-09-12 PROCEDURE — 87641 MR-STAPH DNA AMP PROBE: CPT

## 2023-09-12 PROCEDURE — 81001 URINALYSIS AUTO W/SCOPE: CPT

## 2023-09-12 PROCEDURE — 93010 ELECTROCARDIOGRAM REPORT: CPT | Performed by: INTERNAL MEDICINE

## 2023-09-12 PROCEDURE — 85730 THROMBOPLASTIN TIME PARTIAL: CPT

## 2023-09-12 PROCEDURE — 87186 SC STD MICRODIL/AGAR DIL: CPT

## 2023-09-12 PROCEDURE — 87088 URINE BACTERIA CULTURE: CPT

## 2023-09-12 PROCEDURE — 86850 RBC ANTIBODY SCREEN: CPT

## 2023-09-12 PROCEDURE — 86900 BLOOD TYPING SEROLOGIC ABO: CPT

## 2023-09-12 PROCEDURE — 86901 BLOOD TYPING SEROLOGIC RH(D): CPT

## 2023-09-12 PROCEDURE — 85610 PROTHROMBIN TIME: CPT

## 2023-09-12 PROCEDURE — 87086 URINE CULTURE/COLONY COUNT: CPT

## 2023-09-12 PROCEDURE — 93005 ELECTROCARDIOGRAM TRACING: CPT

## 2023-09-13 ENCOUNTER — TELEPHONE (OUTPATIENT)
Dept: INTERNAL MEDICINE CLINIC | Facility: CLINIC | Age: 78
End: 2023-09-13

## 2023-09-13 DIAGNOSIS — R30.0 DYSURIA: Primary | ICD-10-CM

## 2023-09-13 RX ORDER — LEVOFLOXACIN 250 MG/1
250 TABLET ORAL DAILY
Qty: 10 TABLET | Refills: 0 | Status: SHIPPED | OUTPATIENT
Start: 2023-09-13 | End: 2023-09-14 | Stop reason: ALTCHOICE

## 2023-09-14 RX ORDER — CEPHALEXIN 500 MG/1
500 CAPSULE ORAL 3 TIMES DAILY
Qty: 15 CAPSULE | Refills: 0 | Status: SHIPPED | OUTPATIENT
Start: 2023-09-14 | End: 2023-09-19

## 2023-09-18 RX ORDER — ACETAMINOPHEN 325 MG/1
325 TABLET ORAL EVERY 6 HOURS PRN
COMMUNITY

## 2023-09-19 ENCOUNTER — LAB ENCOUNTER (OUTPATIENT)
Dept: LAB | Age: 78
DRG: 454 | End: 2023-09-19
Attending: INTERNAL MEDICINE
Payer: MEDICARE

## 2023-09-19 ENCOUNTER — LAB ENCOUNTER (OUTPATIENT)
Dept: LAB | Age: 78
End: 2023-09-19
Attending: INTERNAL MEDICINE
Payer: MEDICARE

## 2023-09-19 LAB
BILIRUB UR QL: NEGATIVE
CLARITY UR: CLEAR
COLOR UR: COLORLESS
GLUCOSE UR-MCNC: >1000 MG/DL
HGB UR QL STRIP.AUTO: NEGATIVE
HYALINE CASTS #/AREA URNS AUTO: PRESENT /LPF
KETONES UR-MCNC: NEGATIVE MG/DL
LEUKOCYTE ESTERASE UR QL STRIP.AUTO: NEGATIVE
NITRITE UR QL STRIP.AUTO: NEGATIVE
PH UR: 5 [PH] (ref 5–8)
PROT UR-MCNC: 30 MG/DL
SP GR UR STRIP: 1.01 (ref 1–1.03)
UROBILINOGEN UR STRIP-ACNC: NORMAL

## 2023-09-19 PROCEDURE — 81001 URINALYSIS AUTO W/SCOPE: CPT | Performed by: INTERNAL MEDICINE

## 2023-09-20 ENCOUNTER — ANESTHESIA EVENT (OUTPATIENT)
Dept: SURGERY | Facility: HOSPITAL | Age: 78
End: 2023-09-20
Payer: MEDICARE

## 2023-09-21 ENCOUNTER — HOSPITAL ENCOUNTER (INPATIENT)
Facility: HOSPITAL | Age: 78
LOS: 1 days | Discharge: HOME | DRG: 454 | End: 2023-09-22
Attending: ORTHOPAEDIC SURGERY | Admitting: ORTHOPAEDIC SURGERY
Payer: MEDICARE

## 2023-09-21 ENCOUNTER — APPOINTMENT (OUTPATIENT)
Dept: GENERAL RADIOLOGY | Facility: HOSPITAL | Age: 78
End: 2023-09-21
Attending: ORTHOPAEDIC SURGERY
Payer: MEDICARE

## 2023-09-21 ENCOUNTER — ANESTHESIA (OUTPATIENT)
Dept: SURGERY | Facility: HOSPITAL | Age: 78
End: 2023-09-21
Payer: MEDICARE

## 2023-09-21 ENCOUNTER — APPOINTMENT (OUTPATIENT)
Dept: GENERAL RADIOLOGY | Facility: HOSPITAL | Age: 78
DRG: 454 | End: 2023-09-21
Attending: ORTHOPAEDIC SURGERY
Payer: MEDICARE

## 2023-09-21 ENCOUNTER — HOSPITAL ENCOUNTER (INPATIENT)
Facility: HOSPITAL | Age: 78
LOS: 1 days | Discharge: HOME | End: 2023-09-22
Attending: ORTHOPAEDIC SURGERY | Admitting: ORTHOPAEDIC SURGERY
Payer: MEDICARE

## 2023-09-21 DIAGNOSIS — Z01.818 PRE-OP TESTING: Primary | ICD-10-CM

## 2023-09-21 LAB
GLUCOSE BLDC GLUCOMTR-MCNC: 173 MG/DL (ref 70–99)
GLUCOSE BLDC GLUCOMTR-MCNC: 185 MG/DL (ref 70–99)
GLUCOSE BLDC GLUCOMTR-MCNC: 228 MG/DL (ref 70–99)

## 2023-09-21 PROCEDURE — 01NB0ZZ RELEASE LUMBAR NERVE, OPEN APPROACH: ICD-10-PCS | Performed by: ORTHOPAEDIC SURGERY

## 2023-09-21 PROCEDURE — 0SG00A0 FUSION OF LUMBAR VERTEBRAL JOINT WITH INTERBODY FUSION DEVICE, ANTERIOR APPROACH, ANTERIOR COLUMN, OPEN APPROACH: ICD-10-PCS | Performed by: ORTHOPAEDIC SURGERY

## 2023-09-21 PROCEDURE — 0SG00K1 FUSION OF LUMBAR VERTEBRAL JOINT WITH NONAUTOLOGOUS TISSUE SUBSTITUTE, POSTERIOR APPROACH, POSTERIOR COLUMN, OPEN APPROACH: ICD-10-PCS | Performed by: ORTHOPAEDIC SURGERY

## 2023-09-21 PROCEDURE — 0SB20ZZ EXCISION OF LUMBAR VERTEBRAL DISC, OPEN APPROACH: ICD-10-PCS | Performed by: ORTHOPAEDIC SURGERY

## 2023-09-21 PROCEDURE — 4A11X4G MONITORING OF PERIPHERAL NERVOUS ELECTRICAL ACTIVITY, INTRAOPERATIVE, EXTERNAL APPROACH: ICD-10-PCS | Performed by: ORTHOPAEDIC SURGERY

## 2023-09-21 PROCEDURE — 76000 FLUOROSCOPY <1 HR PHYS/QHP: CPT | Performed by: ORTHOPAEDIC SURGERY

## 2023-09-21 DEVICE — OSTEOCEL PRO LARGE BULK BUY: Type: IMPLANTABLE DEVICE | Site: BACK | Status: FUNCTIONAL

## 2023-09-21 DEVICE — ROD SPNL L45MM DIA5.5MM POST: Type: IMPLANTABLE DEVICE | Site: BACK | Status: FUNCTIONAL

## 2023-09-21 DEVICE — SCREW SPNL DIA5.5MM OPN TULIP LOK RELINE: Type: IMPLANTABLE DEVICE | Site: BACK | Status: FUNCTIONAL

## 2023-09-21 DEVICE — RELINE MAS RED SCREW 5.5X45 2C: Type: IMPLANTABLE DEVICE | Site: BACK | Status: FUNCTIONAL

## 2023-09-21 DEVICE — COHERE XL, 10X18X45MM 10°
Type: IMPLANTABLE DEVICE | Site: BACK | Status: FUNCTIONAL
Brand: COHERE

## 2023-09-21 DEVICE — XLIF AMS PLATE, 8MM SINGLE-SIDED
Type: IMPLANTABLE DEVICE | Site: BACK | Status: FUNCTIONAL
Brand: XLIF

## 2023-09-21 RX ORDER — ACETAMINOPHEN 500 MG
1000 TABLET ORAL ONCE
Status: DISCONTINUED | OUTPATIENT
Start: 2023-09-21 | End: 2023-09-21 | Stop reason: HOSPADM

## 2023-09-21 RX ORDER — LIDOCAINE HYDROCHLORIDE 10 MG/ML
INJECTION, SOLUTION EPIDURAL; INFILTRATION; INTRACAUDAL; PERINEURAL AS NEEDED
Status: DISCONTINUED | OUTPATIENT
Start: 2023-09-21 | End: 2023-09-21 | Stop reason: SURG

## 2023-09-21 RX ORDER — DIPHENHYDRAMINE HYDROCHLORIDE 50 MG/ML
25 INJECTION INTRAMUSCULAR; INTRAVENOUS EVERY 4 HOURS PRN
Status: DISCONTINUED | OUTPATIENT
Start: 2023-09-21 | End: 2023-09-22

## 2023-09-21 RX ORDER — ONDANSETRON 2 MG/ML
4 INJECTION INTRAMUSCULAR; INTRAVENOUS EVERY 6 HOURS PRN
Status: DISCONTINUED | OUTPATIENT
Start: 2023-09-21 | End: 2023-09-22

## 2023-09-21 RX ORDER — MORPHINE SULFATE 4 MG/ML
4 INJECTION, SOLUTION INTRAMUSCULAR; INTRAVENOUS EVERY 10 MIN PRN
Status: DISCONTINUED | OUTPATIENT
Start: 2023-09-21 | End: 2023-09-21 | Stop reason: HOSPADM

## 2023-09-21 RX ORDER — HYDROMORPHONE HYDROCHLORIDE 1 MG/ML
0.2 INJECTION, SOLUTION INTRAMUSCULAR; INTRAVENOUS; SUBCUTANEOUS EVERY 2 HOUR PRN
Status: DISCONTINUED | OUTPATIENT
Start: 2023-09-21 | End: 2023-09-22

## 2023-09-21 RX ORDER — ENEMA 19; 7 G/133ML; G/133ML
1 ENEMA RECTAL ONCE AS NEEDED
Status: DISCONTINUED | OUTPATIENT
Start: 2023-09-21 | End: 2023-09-22

## 2023-09-21 RX ORDER — ROCURONIUM BROMIDE 10 MG/ML
INJECTION, SOLUTION INTRAVENOUS AS NEEDED
Status: DISCONTINUED | OUTPATIENT
Start: 2023-09-21 | End: 2023-09-21 | Stop reason: SURG

## 2023-09-21 RX ORDER — GLYCOPYRROLATE 0.2 MG/ML
INJECTION, SOLUTION INTRAMUSCULAR; INTRAVENOUS AS NEEDED
Status: DISCONTINUED | OUTPATIENT
Start: 2023-09-21 | End: 2023-09-21 | Stop reason: SURG

## 2023-09-21 RX ORDER — HYDROMORPHONE HYDROCHLORIDE 1 MG/ML
0.2 INJECTION, SOLUTION INTRAMUSCULAR; INTRAVENOUS; SUBCUTANEOUS EVERY 5 MIN PRN
Status: DISCONTINUED | OUTPATIENT
Start: 2023-09-21 | End: 2023-09-21 | Stop reason: HOSPADM

## 2023-09-21 RX ORDER — DIPHENHYDRAMINE HCL 25 MG
25 CAPSULE ORAL EVERY 4 HOURS PRN
Status: DISCONTINUED | OUTPATIENT
Start: 2023-09-21 | End: 2023-09-22

## 2023-09-21 RX ORDER — BUPIVACAINE HYDROCHLORIDE AND EPINEPHRINE 5; 5 MG/ML; UG/ML
INJECTION, SOLUTION PERINEURAL AS NEEDED
Status: DISCONTINUED | OUTPATIENT
Start: 2023-09-21 | End: 2023-09-21 | Stop reason: HOSPADM

## 2023-09-21 RX ORDER — BISACODYL 10 MG
10 SUPPOSITORY, RECTAL RECTAL
Status: DISCONTINUED | OUTPATIENT
Start: 2023-09-21 | End: 2023-09-22

## 2023-09-21 RX ORDER — METOCLOPRAMIDE HYDROCHLORIDE 5 MG/ML
10 INJECTION INTRAMUSCULAR; INTRAVENOUS EVERY 8 HOURS PRN
Status: DISCONTINUED | OUTPATIENT
Start: 2023-09-21 | End: 2023-09-21 | Stop reason: HOSPADM

## 2023-09-21 RX ORDER — FUROSEMIDE 20 MG/1
20 TABLET ORAL DAILY
Status: DISCONTINUED | OUTPATIENT
Start: 2023-09-22 | End: 2023-09-22

## 2023-09-21 RX ORDER — DEXAMETHASONE SODIUM PHOSPHATE 4 MG/ML
VIAL (ML) INJECTION AS NEEDED
Status: DISCONTINUED | OUTPATIENT
Start: 2023-09-21 | End: 2023-09-21 | Stop reason: SURG

## 2023-09-21 RX ORDER — CEFAZOLIN SODIUM/WATER 2 G/20 ML
2 SYRINGE (ML) INTRAVENOUS ONCE
Status: COMPLETED | OUTPATIENT
Start: 2023-09-21 | End: 2023-09-21

## 2023-09-21 RX ORDER — POLYETHYLENE GLYCOL 3350 17 G/17G
17 POWDER, FOR SOLUTION ORAL DAILY PRN
Status: DISCONTINUED | OUTPATIENT
Start: 2023-09-21 | End: 2023-09-22

## 2023-09-21 RX ORDER — METOCLOPRAMIDE HYDROCHLORIDE 5 MG/ML
10 INJECTION INTRAMUSCULAR; INTRAVENOUS EVERY 8 HOURS PRN
Status: DISCONTINUED | OUTPATIENT
Start: 2023-09-21 | End: 2023-09-22

## 2023-09-21 RX ORDER — OXYCODONE HYDROCHLORIDE 5 MG/1
5 TABLET ORAL EVERY 4 HOURS PRN
Status: DISCONTINUED | OUTPATIENT
Start: 2023-09-21 | End: 2023-09-22

## 2023-09-21 RX ORDER — SENNOSIDES 8.6 MG
17.2 TABLET ORAL NIGHTLY
Status: DISCONTINUED | OUTPATIENT
Start: 2023-09-21 | End: 2023-09-22

## 2023-09-21 RX ORDER — METOPROLOL TARTRATE 5 MG/5ML
2.5 INJECTION INTRAVENOUS ONCE
Status: DISCONTINUED | OUTPATIENT
Start: 2023-09-21 | End: 2023-09-21 | Stop reason: HOSPADM

## 2023-09-21 RX ORDER — DEXTROSE MONOHYDRATE 25 G/50ML
50 INJECTION, SOLUTION INTRAVENOUS
Status: DISCONTINUED | OUTPATIENT
Start: 2023-09-21 | End: 2023-09-21 | Stop reason: HOSPADM

## 2023-09-21 RX ORDER — ONDANSETRON 2 MG/ML
INJECTION INTRAMUSCULAR; INTRAVENOUS AS NEEDED
Status: DISCONTINUED | OUTPATIENT
Start: 2023-09-21 | End: 2023-09-21 | Stop reason: SURG

## 2023-09-21 RX ORDER — NICOTINE POLACRILEX 4 MG
15 LOZENGE BUCCAL
Status: DISCONTINUED | OUTPATIENT
Start: 2023-09-21 | End: 2023-09-21 | Stop reason: HOSPADM

## 2023-09-21 RX ORDER — HYDROMORPHONE HYDROCHLORIDE 1 MG/ML
0.4 INJECTION, SOLUTION INTRAMUSCULAR; INTRAVENOUS; SUBCUTANEOUS EVERY 2 HOUR PRN
Status: DISCONTINUED | OUTPATIENT
Start: 2023-09-21 | End: 2023-09-22

## 2023-09-21 RX ORDER — AMLODIPINE BESYLATE 5 MG/1
5 TABLET ORAL DAILY
Status: DISCONTINUED | OUTPATIENT
Start: 2023-09-22 | End: 2023-09-22

## 2023-09-21 RX ORDER — ONDANSETRON 2 MG/ML
4 INJECTION INTRAMUSCULAR; INTRAVENOUS EVERY 6 HOURS PRN
Status: DISCONTINUED | OUTPATIENT
Start: 2023-09-21 | End: 2023-09-21 | Stop reason: HOSPADM

## 2023-09-21 RX ORDER — EPHEDRINE SULFATE 50 MG/ML
INJECTION, SOLUTION INTRAVENOUS AS NEEDED
Status: DISCONTINUED | OUTPATIENT
Start: 2023-09-21 | End: 2023-09-21 | Stop reason: SURG

## 2023-09-21 RX ORDER — CEFAZOLIN SODIUM/WATER 2 G/20 ML
2 SYRINGE (ML) INTRAVENOUS EVERY 8 HOURS
Qty: 40 ML | Refills: 0 | Status: COMPLETED | OUTPATIENT
Start: 2023-09-22 | End: 2023-09-22

## 2023-09-21 RX ORDER — PHENYLEPHRINE HCL 10 MG/ML
VIAL (ML) INJECTION AS NEEDED
Status: DISCONTINUED | OUTPATIENT
Start: 2023-09-21 | End: 2023-09-21 | Stop reason: SURG

## 2023-09-21 RX ORDER — ISOSORBIDE MONONITRATE 30 MG/1
30 TABLET, EXTENDED RELEASE ORAL DAILY
Status: DISCONTINUED | OUTPATIENT
Start: 2023-09-22 | End: 2023-09-22

## 2023-09-21 RX ORDER — SODIUM CHLORIDE 9 MG/ML
INJECTION, SOLUTION INTRAVENOUS CONTINUOUS PRN
Status: DISCONTINUED | OUTPATIENT
Start: 2023-09-21 | End: 2023-09-21 | Stop reason: SURG

## 2023-09-21 RX ORDER — LATANOPROST 50 UG/ML
1 SOLUTION/ DROPS OPHTHALMIC NIGHTLY
Status: DISCONTINUED | OUTPATIENT
Start: 2023-09-21 | End: 2023-09-22

## 2023-09-21 RX ORDER — NICOTINE POLACRILEX 4 MG
15 LOZENGE BUCCAL
Status: DISCONTINUED | OUTPATIENT
Start: 2023-09-21 | End: 2023-09-22

## 2023-09-21 RX ORDER — MORPHINE SULFATE 4 MG/ML
2 INJECTION, SOLUTION INTRAMUSCULAR; INTRAVENOUS EVERY 10 MIN PRN
Status: DISCONTINUED | OUTPATIENT
Start: 2023-09-21 | End: 2023-09-21 | Stop reason: HOSPADM

## 2023-09-21 RX ORDER — HYDROMORPHONE HYDROCHLORIDE 1 MG/ML
0.4 INJECTION, SOLUTION INTRAMUSCULAR; INTRAVENOUS; SUBCUTANEOUS EVERY 5 MIN PRN
Status: DISCONTINUED | OUTPATIENT
Start: 2023-09-21 | End: 2023-09-21 | Stop reason: HOSPADM

## 2023-09-21 RX ORDER — MORPHINE SULFATE 10 MG/ML
6 INJECTION, SOLUTION INTRAMUSCULAR; INTRAVENOUS EVERY 10 MIN PRN
Status: DISCONTINUED | OUTPATIENT
Start: 2023-09-21 | End: 2023-09-21 | Stop reason: HOSPADM

## 2023-09-21 RX ORDER — HYDRALAZINE HYDROCHLORIDE 50 MG/1
50 TABLET, FILM COATED ORAL 3 TIMES DAILY
Status: DISCONTINUED | OUTPATIENT
Start: 2023-09-21 | End: 2023-09-22

## 2023-09-21 RX ORDER — DIAZEPAM 2 MG/1
2 TABLET ORAL EVERY 6 HOURS PRN
Status: DISCONTINUED | OUTPATIENT
Start: 2023-09-21 | End: 2023-09-22

## 2023-09-21 RX ORDER — ACETAMINOPHEN 325 MG/1
325 TABLET ORAL EVERY 6 HOURS PRN
Status: DISCONTINUED | OUTPATIENT
Start: 2023-09-21 | End: 2023-09-22

## 2023-09-21 RX ORDER — SODIUM CHLORIDE, SODIUM LACTATE, POTASSIUM CHLORIDE, CALCIUM CHLORIDE 600; 310; 30; 20 MG/100ML; MG/100ML; MG/100ML; MG/100ML
INJECTION, SOLUTION INTRAVENOUS CONTINUOUS
Status: DISCONTINUED | OUTPATIENT
Start: 2023-09-21 | End: 2023-09-22

## 2023-09-21 RX ORDER — ROSUVASTATIN CALCIUM 20 MG/1
20 TABLET, COATED ORAL NIGHTLY
Status: DISCONTINUED | OUTPATIENT
Start: 2023-09-21 | End: 2023-09-22

## 2023-09-21 RX ORDER — DOCUSATE SODIUM 100 MG/1
100 CAPSULE, LIQUID FILLED ORAL 2 TIMES DAILY
Status: DISCONTINUED | OUTPATIENT
Start: 2023-09-21 | End: 2023-09-22

## 2023-09-21 RX ORDER — HYDROMORPHONE HYDROCHLORIDE 1 MG/ML
0.6 INJECTION, SOLUTION INTRAMUSCULAR; INTRAVENOUS; SUBCUTANEOUS EVERY 5 MIN PRN
Status: DISCONTINUED | OUTPATIENT
Start: 2023-09-21 | End: 2023-09-21 | Stop reason: HOSPADM

## 2023-09-21 RX ORDER — OXYCODONE HYDROCHLORIDE 5 MG/1
2.5 TABLET ORAL EVERY 4 HOURS PRN
Status: DISCONTINUED | OUTPATIENT
Start: 2023-09-21 | End: 2023-09-22

## 2023-09-21 RX ORDER — METOPROLOL SUCCINATE 50 MG/1
50 TABLET, EXTENDED RELEASE ORAL 2 TIMES DAILY
Status: DISCONTINUED | OUTPATIENT
Start: 2023-09-21 | End: 2023-09-22

## 2023-09-21 RX ORDER — DEXTROSE MONOHYDRATE 25 G/50ML
50 INJECTION, SOLUTION INTRAVENOUS
Status: DISCONTINUED | OUTPATIENT
Start: 2023-09-21 | End: 2023-09-22

## 2023-09-21 RX ORDER — SODIUM CHLORIDE, SODIUM LACTATE, POTASSIUM CHLORIDE, CALCIUM CHLORIDE 600; 310; 30; 20 MG/100ML; MG/100ML; MG/100ML; MG/100ML
INJECTION, SOLUTION INTRAVENOUS CONTINUOUS
Status: DISCONTINUED | OUTPATIENT
Start: 2023-09-21 | End: 2023-09-21 | Stop reason: HOSPADM

## 2023-09-21 RX ORDER — NICOTINE POLACRILEX 4 MG
30 LOZENGE BUCCAL
Status: DISCONTINUED | OUTPATIENT
Start: 2023-09-21 | End: 2023-09-22

## 2023-09-21 RX ORDER — NALOXONE HYDROCHLORIDE 0.4 MG/ML
80 INJECTION, SOLUTION INTRAMUSCULAR; INTRAVENOUS; SUBCUTANEOUS AS NEEDED
Status: DISCONTINUED | OUTPATIENT
Start: 2023-09-21 | End: 2023-09-21 | Stop reason: HOSPADM

## 2023-09-21 RX ORDER — NICOTINE POLACRILEX 4 MG
30 LOZENGE BUCCAL
Status: DISCONTINUED | OUTPATIENT
Start: 2023-09-21 | End: 2023-09-21 | Stop reason: HOSPADM

## 2023-09-21 RX ORDER — MIDAZOLAM HYDROCHLORIDE 1 MG/ML
INJECTION INTRAMUSCULAR; INTRAVENOUS AS NEEDED
Status: DISCONTINUED | OUTPATIENT
Start: 2023-09-21 | End: 2023-09-21 | Stop reason: SURG

## 2023-09-21 RX ADMIN — EPHEDRINE SULFATE 5 MG: 50 INJECTION, SOLUTION INTRAVENOUS at 16:28:00

## 2023-09-21 RX ADMIN — EPHEDRINE SULFATE 5 MG: 50 INJECTION, SOLUTION INTRAVENOUS at 16:55:00

## 2023-09-21 RX ADMIN — SODIUM CHLORIDE: 9 INJECTION, SOLUTION INTRAVENOUS at 16:22:00

## 2023-09-21 RX ADMIN — SODIUM CHLORIDE: 9 INJECTION, SOLUTION INTRAVENOUS at 17:20:00

## 2023-09-21 RX ADMIN — SODIUM CHLORIDE, SODIUM LACTATE, POTASSIUM CHLORIDE, CALCIUM CHLORIDE: 600; 310; 30; 20 INJECTION, SOLUTION INTRAVENOUS at 15:59:00

## 2023-09-21 RX ADMIN — EPHEDRINE SULFATE 5 MG: 50 INJECTION, SOLUTION INTRAVENOUS at 17:02:00

## 2023-09-21 RX ADMIN — CEFAZOLIN SODIUM/WATER 2 G: 2 G/20 ML SYRINGE (ML) INTRAVENOUS at 15:59:00

## 2023-09-21 RX ADMIN — ONDANSETRON 4 MG: 2 INJECTION INTRAMUSCULAR; INTRAVENOUS at 18:01:00

## 2023-09-21 RX ADMIN — GLYCOPYRROLATE 0.2 MG: 0.2 INJECTION, SOLUTION INTRAMUSCULAR; INTRAVENOUS at 16:00:00

## 2023-09-21 RX ADMIN — MIDAZOLAM HYDROCHLORIDE 1 MG: 1 INJECTION INTRAMUSCULAR; INTRAVENOUS at 15:59:00

## 2023-09-21 RX ADMIN — LIDOCAINE HYDROCHLORIDE 50 MG: 10 INJECTION, SOLUTION EPIDURAL; INFILTRATION; INTRACAUDAL; PERINEURAL at 16:01:00

## 2023-09-21 RX ADMIN — DEXAMETHASONE SODIUM PHOSPHATE 4 MG: 4 MG/ML VIAL (ML) INJECTION at 16:51:00

## 2023-09-21 RX ADMIN — EPHEDRINE SULFATE 5 MG: 50 INJECTION, SOLUTION INTRAVENOUS at 16:47:00

## 2023-09-21 RX ADMIN — EPHEDRINE SULFATE 5 MG: 50 INJECTION, SOLUTION INTRAVENOUS at 16:58:00

## 2023-09-21 RX ADMIN — PHENYLEPHRINE HCL 100 MCG: 10 MG/ML VIAL (ML) INJECTION at 17:02:00

## 2023-09-21 RX ADMIN — ROCURONIUM BROMIDE 10 MG: 10 INJECTION, SOLUTION INTRAVENOUS at 16:01:00

## 2023-09-21 RX ADMIN — SODIUM CHLORIDE, SODIUM LACTATE, POTASSIUM CHLORIDE, CALCIUM CHLORIDE: 600; 310; 30; 20 INJECTION, SOLUTION INTRAVENOUS at 17:20:00

## 2023-09-21 NOTE — ANESTHESIA POSTPROCEDURE EVALUATION
Patient: Roxana Hamm    Procedure Summary       Date: 09/21/23 Room / Location: [address] [place] [address] MAIN OR 10 / [address] [place] [address] MAIN OR    Anesthesia Start: 9647 Anesthesia Stop: 2380    Procedures:       L3-4 lateral interbody fusion with posterior spinal fusion, possible L3-4 laminectomy (Spine Lumbar)      Lumbar laminectomy 1 level (Spine Lumbar) Diagnosis: (Lumbar radiculopathy, spinal stenosis fusion, lumbar region with neurogenic claudication, degeneration of lumbar intervertebral disc)    Surgeons: Johnny Esteves MD Anesthesiologist: Dayana Faustin MD    Anesthesia Type: general ASA Status: 4            Anesthesia Type: No value filed. Vitals Value Taken Time   /68 09/21/23 1824   Temp 97 09/21/23 1831   Pulse 60 09/21/23 1830   Resp 19 09/21/23 1830   SpO2 97 % 09/21/23 1830   Vitals shown include unfiled device data.     [address] [place] [address] AN Post Evaluation:   Patient Evaluated in PACU  Patient Participation: complete - patient participated  Level of Consciousness: awake  Pain Score: 0  Pain Management: adequate  Airway Patency:patent  Dental exam unchanged from preop  Yes    Cardiovascular Status: acceptable  Respiratory Status: acceptable  Postoperative Hydration acceptable      Evita Aguilera CRNA  9/21/2023 6:31 PM

## 2023-09-21 NOTE — ANESTHESIA PROCEDURE NOTES
Airway  Date/Time: 9/21/2023 4:17 PM  Urgency: elective      General Information and Staff    Patient location during procedure: OR  Resident/CRNA: Arnel Solares CRNA  Performed: CRNA   Performed by: Arnel Solares CRNA  Authorized by: Gemma Briceño MD      Indications and Patient Condition  Indications for airway management: anesthesia  Spontaneous ventilation: present  Sedation level: deep  Preoxygenated: yes  Patient position: sniffing  MILS maintained throughout  Mask difficulty assessment: 0 - not attempted  No planned trial extubation    Final Airway Details  Final airway type: endotracheal airway      Successful airway: ETT  Cuffed: yes   Successful intubation technique: direct laryngoscopy  Facilitating devices/methods: cricoid pressure  Endotracheal tube insertion site: oral  Blade: Janae  Blade size: #3  ETT size (mm): 7.0    Cormack-Lehane Classification: grade IIA - partial view of glottis  Placement verified by: capnometry   Cuff volume (mL): 8  Measured from: gums  ETT to gums (cm): 22  Number of attempts at approach: 1  Number of other approaches attempted: 0

## 2023-09-22 ENCOUNTER — APPOINTMENT (OUTPATIENT)
Dept: GENERAL RADIOLOGY | Facility: HOSPITAL | Age: 78
End: 2023-09-22
Attending: PHYSICIAN ASSISTANT
Payer: MEDICARE

## 2023-09-22 ENCOUNTER — TELEPHONE (OUTPATIENT)
Dept: INTERNAL MEDICINE CLINIC | Facility: CLINIC | Age: 78
End: 2023-09-22

## 2023-09-22 ENCOUNTER — APPOINTMENT (OUTPATIENT)
Dept: GENERAL RADIOLOGY | Facility: HOSPITAL | Age: 78
DRG: 454 | End: 2023-09-22
Attending: PHYSICIAN ASSISTANT
Payer: MEDICARE

## 2023-09-22 VITALS
DIASTOLIC BLOOD PRESSURE: 55 MMHG | OXYGEN SATURATION: 97 % | HEART RATE: 55 BPM | HEIGHT: 62 IN | SYSTOLIC BLOOD PRESSURE: 167 MMHG | RESPIRATION RATE: 16 BRPM | BODY MASS INDEX: 29.44 KG/M2 | TEMPERATURE: 97 F | WEIGHT: 160 LBS

## 2023-09-22 DIAGNOSIS — I48.91 ATRIAL FIBRILLATION, UNSPECIFIED TYPE (HCC): Primary | ICD-10-CM

## 2023-09-22 PROBLEM — M54.16 LUMBAR RADICULOPATHY: Status: ACTIVE | Noted: 2023-09-22

## 2023-09-22 LAB
ANION GAP SERPL CALC-SCNC: 10 MMOL/L (ref 0–18)
ATRIAL RATE: 61 BPM
BUN BLD-MCNC: 24 MG/DL (ref 7–18)
BUN/CREAT SERPL: 14.5 (ref 10–20)
CALCIUM BLD-MCNC: 8.7 MG/DL (ref 8.5–10.1)
CHLORIDE SERPL-SCNC: 113 MMOL/L (ref 98–112)
CO2 SERPL-SCNC: 19 MMOL/L (ref 21–32)
CREAT BLD-MCNC: 1.65 MG/DL
DEPRECATED RDW RBC AUTO: 42.4 FL (ref 35.1–46.3)
EGFRCR SERPLBLD CKD-EPI 2021: 31 ML/MIN/1.73M2 (ref 60–?)
ERYTHROCYTE [DISTWIDTH] IN BLOOD BY AUTOMATED COUNT: 12.5 % (ref 11–15)
GLUCOSE BLD-MCNC: 257 MG/DL (ref 70–99)
GLUCOSE BLDC GLUCOMTR-MCNC: 235 MG/DL (ref 70–99)
GLUCOSE BLDC GLUCOMTR-MCNC: 248 MG/DL (ref 70–99)
HCT VFR BLD AUTO: 36.8 %
HGB BLD-MCNC: 12.3 G/DL
MCH RBC QN AUTO: 30.9 PG (ref 26–34)
MCHC RBC AUTO-ENTMCNC: 33.4 G/DL (ref 31–37)
MCV RBC AUTO: 92.5 FL
OSMOLALITY SERPL CALC.SUM OF ELEC: 307 MOSM/KG (ref 275–295)
P AXIS: 52 DEGREES
P-R INTERVAL: 182 MS
PLATELET # BLD AUTO: 191 10(3)UL (ref 150–450)
POTASSIUM SERPL-SCNC: 4 MMOL/L (ref 3.5–5.1)
Q-T INTERVAL: 500 MS
QRS DURATION: 134 MS
QTC CALCULATION (BEZET): 503 MS
R AXIS: -50 DEGREES
RBC # BLD AUTO: 3.98 X10(6)UL
SODIUM SERPL-SCNC: 142 MMOL/L (ref 136–145)
T AXIS: 141 DEGREES
VENTRICULAR RATE: 61 BPM
WBC # BLD AUTO: 11.8 X10(3) UL (ref 4–11)

## 2023-09-22 PROCEDURE — 99232 SBSQ HOSP IP/OBS MODERATE 35: CPT | Performed by: INTERNAL MEDICINE

## 2023-09-22 PROCEDURE — 72100 X-RAY EXAM L-S SPINE 2/3 VWS: CPT | Performed by: PHYSICIAN ASSISTANT

## 2023-09-22 RX ORDER — POLYETHYLENE GLYCOL 3350 17 G/17G
17 POWDER, FOR SOLUTION ORAL DAILY
Qty: 30 EACH | Refills: 0 | Status: SHIPPED | OUTPATIENT
Start: 2023-09-22 | End: 2023-09-22

## 2023-09-22 RX ORDER — POLYETHYLENE GLYCOL 3350 17 G/17G
17 POWDER, FOR SOLUTION ORAL DAILY
Qty: 30 EACH | Refills: 0 | Status: SHIPPED | OUTPATIENT
Start: 2023-09-22

## 2023-09-22 RX ORDER — POLYETHYLENE GLYCOL 3350 17 G/17G
17 POWDER, FOR SOLUTION ORAL DAILY
Status: DISCONTINUED | OUTPATIENT
Start: 2023-09-22 | End: 2023-09-22

## 2023-09-22 NOTE — PLAN OF CARE
Problem: PAIN - ADULT  Goal: Verbalizes/displays adequate comfort level or patient's stated pain goal  Description: INTERVENTIONS:  - Encourage pt to monitor pain and request assistance  - Assess pain using appropriate pain scale  - Administer analgesics based on type and severity of pain and evaluate response  - Implement non-pharmacological measures as appropriate and evaluate response  - Consider cultural and social influences on pain and pain management  - Manage/alleviate anxiety  - Utilize distraction and/or relaxation techniques  - Monitor for opioid side effects  - Notify MD/LIP if interventions unsuccessful or patient reports new pain  - Anticipate increased pain with activity and pre-medicate as appropriate  Outcome: Adequate for Discharge     Problem: SAFETY ADULT - FALL  Goal: Free from fall injury  Description: INTERVENTIONS:  - Assess pt frequently for physical needs  - Identify cognitive and physical deficits and behaviors that affect risk of falls. - [place] fall precautions as indicated by assessment.  - Educate pt/family on patient safety including physical limitations  - Instruct pt to call for assistance with activity based on assessment  - Modify environment to reduce risk of injury  - Provide assistive devices as appropriate  - Consider OT/PT consult to assist with strengthening/mobility  - Encourage toileting schedule  Outcome: Adequate for Discharge   Dressing to lower left back remains clean, dry and intact. Ice gel in place. Oxycodone given once and pt declined need for an additional dose. Patient ambulates with stand by assistance and a walker. CMS intact. EKG repeated today for concern of a-fib documented in PACU. Patient to call central scheduling for a Holter monitor. Instructions provided. Patient cleared for discharge home with no needs.

## 2023-09-22 NOTE — CDS QUERY
How to answer this Query:    1.) DON'T CLICK COSIGN BUTTON FIRST  2.) Click \"3 dots. Snyder Riding Snyder Riding \" to the right of cosign button and click EDIT on the toolbar.  2.) Type an \"X\" in the bracket for the diagnosis that applies. (You may also add additional clinical details as you feel necessary to substantiate your response). 3.) Finally click \"Sign\" to complete response. Thank Nestor Cowan  Dear Doctor:JAQUELINE  Clinical information (provided below) includes a diagnosis of Heart Failure. PLEASE (X) ALL DIAGNOSES THAT APPLY.    ( x   ) Chronic Systolic Heart Failure  (    ) Chronic Diastolic Heart Failure    (    ) Chronic combined systolic and diastolic Heart Failure   (    ) Other - please specify:     Documentation from the Medical Record: 512 DR PARSONS=Ischemic cardiomyopathy  Stented coronary artery  [facility] 5/2022 for CHF. 200 DR DIAZ=Chronic combined systolic and diastolic CHF       HOME MEDS-LASIX, IMDUR,METOPROLOL  If you have any questions, please contact Clinical :  Kiersten Dumont RN at [phone]     Thank You!      THIS FORM IS A PERMANENT PART OF THE MEDICAL RECORD

## 2023-09-22 NOTE — OCCUPATIONAL THERAPY NOTE
09/22/23 0950   VISIT TYPE   OT Inpatient Visit Type (Documentation Required) Attempted Evaluation  (DC OT per pt's request. Pt reported pt is able to don LE clothing without A.  PT confirmed.)   OT Follow Up   Charge Other (Comment)  (Missed visit)   Follow Up Needed (Documentation Required) No  (DC OT)       Nas Magaña, OTR/L  [address]

## 2023-09-22 NOTE — OPERATIVE REPORT
PATIENT  Remona Distance    DATE OF SURGERY  9/21/23    SURGEON   Noreen George MD    ASSISTANT  RAYMOND Carrasco    PREOPERATIVE DIAGNOSIS   L3-4 spondylolisthesis with spinal stenosis  Lumbar radiculopathy    POSTOPERATIVE DIAGNOSIS   Same    PROCEDURE   1. Anterolateral interbody fusion with anterior plate, W6-2  2. Use of anterior interbody biomechanical cage device, L3-4  3. Posterior spinal instrumentation L3-4 with fusion  4. Posterior laminectomy L3-4  5. Use of allograft  6. Use of fluoroscopy   7. Neuromonitoring    ANESTHESIA   General     COMPLICATIONS   None immediate     ESTIMATED BLOOD LOSS   25 ml     IMPLANTS   Nuvasive Cohere interbody  Nuvasive reline screws  Cancellous allograft chips- 15 cc     DESCRIPTION OF PROCEDURE     INDICATION   The patient presented to the office history of severe back pain   and radicular symptoms has been nonresponsive to extensive   conservative measures. Radiographic workup revealed   a L3-4 with advanced disk   degeneration, foraminal stenosis. At this time, the patient wished to   proceed with surgery. They understand risks and benefits and   risks of failure to improve, neurologic deterioration, failure of   fusion, need for subsequent surgery. TECHNIQUE   The patient was brought to the operating room. General   anesthesia with endotracheal intubation was performed. The   patient was positioned in the lateral decubitus position with the   left side up. An axillary roll was well placed. Care was taken to ensure all bony prominences were   well padded. The flank was prepped and draped in the usual   fashion. A surgical timeout was performed. The L3-4 disk level   was marked on the lateral radiograph from the patient's skin. An incision was made immediately anterior to the paraspinal   muscles. This was dissected down to fascia with Bovie   dissection. Fascia was then penetrated with a curved 6   instrument and the retroperitoneal space was entered.  A finger was introduced into the retroperitoneal space, which was opened   up. The psoas muscle was palpated and the peritoneum was   maintained anteriorly. At this time, an incision of the left mid   point of the lateral aspect of the L3-4 disk was made. This was   continued down to fascia with Bovie dissection. The fascia and   the abdominal muscles were penetrated with a curved 6, which   was advanced into the retroperitoneal free space towards the   finger in the space. Sequential dilators were then advanced   under fluoroscopy onto and through the psoas using live EMG   monitoring and no neural stimulation probe was noted. The   guidewire was then advanced into the disk space. The   retractor was then advanced over the dilators and secured to the operating   Table. The base of the surgical field was then probed with EMG   and no neural stimulation was noted. The disk was incised with a 15-blade. Diskectomy using   curettes as well as arturo was performed. Meticulous care was   taken to preserve the end plate. A schumacher was advanced through the contra-lateral annulus to release this. At this point,   a distractors were placed into the disk space and    lordotic cage was decided on. A sled placed into the disk space to preserve the   endplates. The cage was then advanced under biplanar fluoroscopy   into the disk space. Excellent disk space distraction was seated   and good position of the cage was noted. A plate lateral to the L3-4 disk space separate from the interbody was placed and screws were placed into the vertebral bodies. The retractor was then   removed. Fluoroscopy confirmed that good straight position and   alignment of the spine. The fascia was then approximated with 0   Vicryl sutures, subcutaneous tissue, and skin was closed and   sterile dressings were applied. The patient was then positioned prone on the [place] spinal frame. Care was taken to   ensure bony prominences were well padded.  The lower back was   prepped and draped in the usual fashion for surgery and local   anesthetic was infiltrated into the paraspinal area. Under   fluoroscopy, an incision was made immediately lateral to the L3   and L4 pedicles under biplane fluoroscopy, a Jamshidi was   advanced from the lateral aspect of the pedicle down the pedicle   into the vertebral body. A guidewire was then advanced down the   Jamshidi into the vertebral body under biplanar fluorosopy. This was performed in identical fashion at all pedicles. At this point, the incision   connecting the guidewire was made. This was continued through   fascia with Bovie dissection. A tubular retractor was placed at L3-4 and the lamina and medial facet was exposed. The lamina was removed with a halima. The ligamentum was exposed and medial facet was removed. The ligamentum was removed piecemeal and the dura was exposed. The transverse process and pars and facet were decorticated at L3-4 for posterior fusion and allograft was left in placed for fusion. The dilators were advanced over   the guidewires and pedicle screws   were then advanced over the guidewire under fluoroscopy. In   addition, live EMG monitoring was performed as the screws were   Advanced. No neural stimulation was   noted. All  pedicle screws were well positioned in the same   fashion. A  eddie was then advanced down the connectors   into the heads of the pedicle screws. The set screws   were then fastened securely. The extenders were then removed and   the pedicle screw eddie constructs was seen to be well positioned   on fluoroscopy. The wound was thoroughly irrigated. Hemostasis was ensured. The fascia was approximated with an 0 Vicryl suture, subcutaneous   tissue, and skin was then approximated. Sterile dressing   applied. The patient returned to recovery in stable condition.

## 2023-09-22 NOTE — PLAN OF CARE
Patient alert and oriented x 4. Post op day 1. Zanaflex administered for pain as needed. Zofran given for nausea. Patient provided eye drops from home. Copy of power of  paperwork in pt's chart. Dressing is dermabond, telfa, and tegaderm. Patient did not get up during shift. Voiding via purewick. Patients diet is cardiac. SCDs for VTE prophylaxis. Vital signs monitored. Cough and deep breathe in addition to incentive spirometer. Bed in lowest position, call light within reach, and non skid socks in place for fall precautions. All needs within reach. Family at bedside. Rounding done by nursing staff. Plan is for PT/OT eval      Problem: Patient Centered Care  Goal: Patient preferences are identified and integrated in the patient's plan of care  Description: Interventions:  - What would you like us to know as we care for you?  From home alone but has family nearby  - Provide timely, complete, and accurate information to patient/family  - Incorporate patient and family knowledge, values, beliefs, and cultural backgrounds into the planning and delivery of care  - Encourage patient/family to participate in care and decision-making at the level they choose  - Honor patient and family perspectives and choices  Outcome: Progressing     Problem: Diabetes/Glucose Control  Goal: Glucose maintained within prescribed range  Description: INTERVENTIONS:  - Monitor Blood Glucose as ordered  - Assess for signs and symptoms of hyperglycemia and hypoglycemia  - Administer ordered medications to maintain glucose within target range  - Assess barriers to adequate nutritional intake and initiate nutrition consult as needed  - Instruct patient on self management of diabetes  Outcome: Progressing     Problem: Patient/Family Goals  Goal: Patient/Family Long Term Goal  Description: Patient's Long Term Goal: To go home    Interventions:  - Pass PT/OT  - See additional Care Plan goals for specific interventions  Outcome: Progressing  Goal: Patient/Family Short Term Goal  Description: Patient's Short Term Goal: Manage pain    Interventions:   - monitor and assess pain  - administer pain medications as indicated   - See additional Care Plan goals for specific interventions  Outcome: Progressing     Problem: PAIN - ADULT  Goal: Verbalizes/displays adequate comfort level or patient's stated pain goal  Description: INTERVENTIONS:  - Encourage pt to monitor pain and request assistance  - Assess pain using appropriate pain scale  - Administer analgesics based on type and severity of pain and evaluate response  - Implement non-pharmacological measures as appropriate and evaluate response  - Consider cultural and social influences on pain and pain management  - Manage/alleviate anxiety  - Utilize distraction and/or relaxation techniques  - Monitor for opioid side effects  - Notify MD/LIP if interventions unsuccessful or patient reports new pain  - Anticipate increased pain with activity and pre-medicate as appropriate  Outcome: Progressing     Problem: RISK FOR INFECTION - ADULT  Goal: Absence of fever/infection during anticipated neutropenic period  Description: INTERVENTIONS  - Monitor WBC  - Administer growth factors as ordered  - Implement neutropenic guidelines  Outcome: Progressing     Problem: SAFETY ADULT - FALL  Goal: Free from fall injury  Description: INTERVENTIONS:  - Assess pt frequently for physical needs  - Identify cognitive and physical deficits and behaviors that affect risk of falls.   - [place] fall precautions as indicated by assessment.  - Educate pt/family on patient safety including physical limitations  - Instruct pt to call for assistance with activity based on assessment  - Modify environment to reduce risk of injury  - Provide assistive devices as appropriate  - Consider OT/PT consult to assist with strengthening/mobility  - Encourage toileting schedule  Outcome: Progressing     Problem: DISCHARGE PLANNING  Goal: Discharge to home or other facility with appropriate resources  Description: INTERVENTIONS:  - Identify barriers to discharge w/pt and caregiver  - Include patient/family/discharge partner in discharge planning  - Arrange for needed discharge resources and transportation as appropriate  - Identify discharge learning needs (meds, wound care, etc)  - Arrange for interpreters to assist at discharge as needed  - Consider post-discharge preferences of patient/family/discharge partner  - Complete POLST form as appropriate  - Assess patient's ability to be responsible for managing their own health  - Refer to Case Management Department for coordinating discharge planning if the patient needs post-hospital services based on physician/LIP order or complex needs related to functional status, cognitive ability or social support system  Outcome: Progressing

## 2023-09-22 NOTE — PHYSICAL THERAPY NOTE
PHYSICAL THERAPY EVALUATION - INPATIENT     Room Number: 428/428-A  Evaluation Date: 9/22/2023  Type of Evaluation: Initial   Physician Order: PT Eval and Treat    Presenting Problem: L3-L4 XLIF c PSF and laminectomy  Co-Morbidities : DM II, CKD stage 3, CAD, cardiomyopathy  Reason for Therapy: Mobility Dysfunction and Discharge Planning    PHYSICAL THERAPY ASSESSMENT     Patient is a 76year old female admitted 9/21/2023 s/p L3-L4 XLIF c PSF and laminectomy with Dr. Estevan Dunlap. RN cleared pt to participate in PT evaluation this morning. Pt received in bed at start of session, agreeable to participate. At baseline pt lives in one level home with 5 RAY. She lives alone but family visits several times per day. Pt reports plan to have family stay with her for a few days upon discharge. Reviewed role of therapy in acute care setting, goals of session, importance of out of bed mobility and safety precautions. Also reviewed spine precautions. Pt receptive to education. Pt tolerated mobility well during session, pain remained consistent at 3/10. Pt overall noting significant relief of symptoms she was experiencing prior to surgery. Pt demonstrates skills necessary to return home with continued family support. She has cane, RW and w/c available at home as well. Bed mobility: sup to sit CGA via log roll   Transfers: STS from EOB and chair with RW and CGA  Gait: ambulated 200 ft and 100 ft with RW and CGA  Stairs: negotiated 5 stairs with single rail and CGA    Patient's current functional deficits include bed mobility, transfers, gait, activity tolerance and endurance, which are below the patient's pre-admission status. The patient's Approx Degree of Impairment: 46.58% has been calculated based on documentation in the [facility] '6 clicks' Inpatient Basic Mobility Short Form. Research supports that patients with this level of impairment may benefit from returning home with initial 24 hr supervision.      Patient will benefit from continued IP PT services to address these deficits in preparation for discharge. DISCHARGE RECOMMENDATIONS  PT Discharge Recommendations: Home;24 hour care/supervision    PLAN  PT Treatment Plan: Bed mobility; Body mechanics; Endurance; Patient education;Gait training;Neuromuscular re-educate;Range of motion;Strengthening;Transfer training;Stair training;Balance training;Energy conservation  Rehab Potential : Good  Frequency (Obs): Daily       PHYSICAL THERAPY MEDICAL/SOCIAL HISTORY     History related to current admission:     Problem List  Active Problems:    Pre-op testing    Lumbar radiculopathy      HOME SITUATION  Home Situation  Type of Home: House  Home Layout: One level  Stairs to Enter : 4  Railing: Yes  Lives With: Alone (family visits daily, plans to stay with her upon discharge)  Patient Owned Equipment: Rolling walker;Cane     Prior Level of [facility]: mod I with RW, family assists IADLs    SUBJECTIVE  \"I feel much better than before. \"     PHYSICAL THERAPY EXAMINATION     OBJECTIVE  Precautions: Spine  Fall Risk: Standard fall risk    WEIGHT BEARING RESTRICTION                PAIN ASSESSMENT  Rating: 3  Location: low back, R LE  Management Techniques: Activity promotion; Body mechanics    COGNITION  Overall Cognitive Status:  WFL - within functional limits    RANGE OF MOTION AND STRENGTH ASSESSMENT  Upper extremity ROM and strength are within functional limits   Lower extremity ROM is within functional limits   Lower extremity strength is within functional limits     BALANCE  Static Sitting: Good  Dynamic Sitting: Fair +  Static Standing: Fair -  Dynamic Standing: Fair -         ACTIVITY TOLERANCE  Pulse: 60  Heart Rate Source: Monitor                   O2 WALK       AM-PAC '6-Clicks' INPATIENT SHORT FORM - BASIC MOBILITY  How much difficulty does the patient currently have. ..   Patient Difficulty: Turning over in bed (including adjusting bedclothes, sheets and blankets)?: A Little   Patient Difficulty: Sitting down on and standing up from a chair with arms (e.g., wheelchair, bedside commode, etc.): A Little   Patient Difficulty: Moving from lying on back to sitting on the side of the bed?: A Little   How much help from another person does the patient currently need. .. Help from Another: Moving to and from a bed to a chair (including a wheelchair)?: A Little   Help from Another: Need to walk in hospital room?: A Little   Help from Another: Climbing 3-5 steps with a railing?: A Little     AM-PAC Score:  Raw Score: 18   Approx Degree of Impairment: 46.58%   Standardized Score (AM-PAC Scale): 43.63   CMS Modifier (G-Code): CK    FUNCTIONAL ABILITY STATUS  Functional Mobility/Gait Assessment  Gait Assistance: Supervision  Distance (ft): 200 ft and 100 ft  Assistive Device: Rolling walker  Pattern:  (decreased miki)  Stairs: Stairs  How Many Stairs: 5  Device: 1 Rail  Assist: Contact guard assist  Pattern: Ascend and Descend  Ascend and Descend : Step to    Bed Mobility: CGA    Transfers: CGA    Exercise/Education Provided:  Bed mobility  Body mechanics  Functional activity tolerated  Gait training  Neuromuscular re-educate  Posture  ROM  Strengthening  Transfer training    Patient End of Session: Up in chair;Needs met;Call light within reach;RN aware of session/findings; All patient questions and concerns addressed; Family present    CURRENT GOALS    Goals to be met by: 10/5  Patient Goal Patient's self-stated goal is: To go home.     Goal #1 Patient is able to demonstrate supine - sit EOB @ level: independent     Goal #1   Current Status    Goal #2 Patient is able to demonstrate transfers EOB to/from Chair/Wheelchair at assistance level: modified independent with walker - rolling     Goal #2  Current Status    Goal #3 Patient is able to ambulate 300 feet with assist device: walker - rolling at assistance level: modified independent   Goal #3   Current Status    Goal #4 Patient will negotiate 5 stairs/one curb w/ assistive device and supervision   Goal #4   Current Status    Goal #5 Patient to demonstrate independence with home activity/exercise instructions provided to patient in preparation for discharge.    Goal #5   Current Status    Goal #6    Goal #6  Current Status      Patient Evaluation Complexity Level:  History Low - no personal factors and/or co-morbidities   Examination of body systems Low - addressing 1-2 elements   Clinical Presentation Low - Stable   Clinical Decision Making Low Complexity       Gait Training: 15 minutes  Therapeutic Activity: 15 minutes

## 2023-09-24 LAB
ATRIAL RATE: 56 BPM
P AXIS: 143 DEGREES
P-R INTERVAL: 130 MS
Q-T INTERVAL: 502 MS
QRS DURATION: 140 MS
QTC CALCULATION (BEZET): 484 MS
R AXIS: 230 DEGREES
T AXIS: 47 DEGREES
VENTRICULAR RATE: 56 BPM

## 2023-09-25 ENCOUNTER — PATIENT OUTREACH (OUTPATIENT)
Dept: CASE MANAGEMENT | Age: 78
End: 2023-09-25

## 2023-09-25 ENCOUNTER — HOSPITAL ENCOUNTER (OUTPATIENT)
Dept: CV DIAGNOSTICS | Facility: HOSPITAL | Age: 78
Discharge: HOME | End: 2023-09-25
Attending: INTERNAL MEDICINE
Payer: MEDICARE

## 2023-09-25 DIAGNOSIS — I48.91 ATRIAL FIBRILLATION, UNSPECIFIED TYPE (HCC): ICD-10-CM

## 2023-09-25 DIAGNOSIS — Z02.9 ENCOUNTERS FOR UNSPECIFIED ADMINISTRATIVE PURPOSE: Primary | ICD-10-CM

## 2023-09-25 PROCEDURE — 93243 EXT ECG>48HR<7D SCAN A/R: CPT | Performed by: INTERNAL MEDICINE

## 2023-09-25 PROCEDURE — 93242 EXT ECG>48HR<7D RECORDING: CPT | Performed by: INTERNAL MEDICINE

## 2023-09-25 PROCEDURE — 1111F DSCHRG MED/CURRENT MED MERGE: CPT

## 2023-09-25 NOTE — PROGRESS NOTES
NCM attempted to reach the patient to complete a TCM/HFU call. Left message to call back. [facility] provided direct contact info at [phone].        Future Appointments   Date Time Provider Zacarias Horton   9/26/2023  1:30  [facility]   10/3/2023 12:00 PM MD NIHARIKA Naidu   12/4/2023 10:00 AM MD NIHARIKA Naidu   2/21/2024 11:20 AM Austin Ace MD [id number]

## 2023-10-01 NOTE — PATIENT INSTRUCTIONS
You were seen in clinic today for postoperative follow-up. Today, we reviewed your clinical course and we are happy to hear you tolerated surgery well. There was an episode of atrial fibrillation, abnormal heart rhythm that did improve prior to discharge. We will check blood test today  - We will await the results of your Holter monitor which may still take a few days to come back  - You may need closer follow-up with cardiology, Dr. Mireille Gann as this is the first time this is happened in your history, follow-up as scheduled    Follow-up with Dr. Clare Orellana. Therapy will be directed per neurosurgery. Follow-up as scheduled  - You are doing outstanding ever since being discharged from the hospital.  Continue with ambulation as tolerated and we will await formal therapy recommendations per Dr. Clare Orellana    Periodically check your blood pressures at home    Periodically check your blood sugars at home and notify us of increasing    We did place follow-up postsurgical blood test to be completed with Dr. Larisa Blunt blood work.   Please asked that they draw all blood test from Dr. Margarita Chaidez and Dr. Angela Jorgensen    Return to clinic in 2 months for regular follow-up as scheduled

## 2023-10-01 NOTE — H&P
Ms. Henry Chávez is a 49-year-old female past medical history of ischemic cardiomyopathy with coronary artery disease, type 2 diabetes, CKD stage IV, hypertension, hyperlipidemia who presents today for posthospitalization follow-up. Recall the patient was admitted for scheduled L3-L4 interbody fusion with posterior spinal fusion with Dr. Celestine Ahmadi of neurosurgery 9/21/2023. She did well with surgery. However, was noted to have transient A-fib while in PACU. She was otherwise stable and was discharged on a Holter monitor. Today, ***    Paroxysmal atrial fibrillation  Transient A-fib while in PACU postop. She was otherwise hemodynamically stable  - Finalized results of Zio patch pending  - We will check BMP, magnesium  - May need closer follow-up with Dr. Cee Rodriguez    Right lumbar radiculopathy  MRI lumbar --2/15/23-L3-L4 right disc extrusion, results in mod to severe right lateral recess stenosis and effacement of the traversing right L4 nerve root. I suspect pain is related to L4 radiculopathy. Patient has seen Dr. Poncho Membreno of physiatry. She wishes another opinion. Referred to pain management clinic. Seen 4/11/2023, plans for SI joint injection on 4/18/2023. If no improvement, may benefit from epidural injection.    -Status post right SI joint injection 4/18/2023 with minimal relief. Most recently seen by Dr. Madelyn Diallo 7/21/2023. Refractory to injections, for which she was recommended to consider operative management-spinal cord stimulator trial with Dr. Darcie Roman  - Status post L3-L4 interbody fusion with posterior spinal fusion with Dr. Celestine Ahmadi 9/21/2023.  -Follow-up with Dr. Celestine Ahmadi neurosurgery  - We will check CBC     Thickened endometrium  -EMB  done 3/9/2023 by Dr. Lucita Dee.  -Biopsy results show no evidence of hyperplasia or malignancy     Ischemic cardiomyopathy  Stented coronary artery  [place] 5/2022 for CHF.   Cardiac cath 5/27/22-Dr. Swenson-stents placed to LAD and diagonal.  An echo 2/28/2023-Dr. Jones-EF 36%, up from 30 to 35% in May 2022.  - Follow-up with Dr. Deepa Bland in May, continued on Entresto  twice daily/metoprolol 50 mg twice daily/Jardiance 10 mg daily/isosorbide mononitrate 30 mg daily/hydralazine 50 mg 3 times daily. Not able to tolerate spironolactone due to hyperkalemia  - No longer on Plavix     CKD (chronic kidney disease), stage IV (HCC)  has solitary kidney. Sees Dr Kavitha Sutton.   - Creatinine 1.65, BUN 24 on day of discharge  - Last seen by Dr. Kavitha Sutton 7/10 continue to monitor stage IV CKD  - We will repeat a BMP     Diabetes type 2  levimir 28 u/d (Dr Luis Sidhu d/c Jardiance 10 mg in Aug 2022-since restarted) , NovoLog via SS. Metformin d/c  RI after R nephrectomy. Glimepiride stopped 2022 low sugars. ophtho Dr Phong Michael. Had to move it August   Recent A1c:   HEMOGLOBIN A1C (%)   Date Value   2021 6.2 (A)     HgbA1C (%)   Date Value   2023 7.0 (H)     Recent urine microalbumin: No components found for: \"URINEMICROALBUMIN\"  Current medications: Levemir 28 units/day, NovoLog sliding scale, Jardiance 10 mg  Eye exam: Due for eye examination  Foot exam: Seems to be well-controlled  - A1c at goal, continue trying to cut down on carbohydrates     Hypertension   -metoprolol ER 50 mg bid, hydralazine 50 mg tid, Imdur 30 mg daily. amlodipine 5 mg daily. (also on entresto). Off Enalapril  for incr K. Off hctz 25 mg  due to renal insuff. - Continue checking blood pressures at home     Hyperlipidemia, mxd  - rosuvastatin 20 mg daily in hosp 2022.    -  LDL 30 on last lipid panel  - Continue to optimize nutrition     Bereavement  -her   of lung cancer 4/3/22. She is doing ok w support system of family, etc. No depression. Declined med or counselor. S/p R nephrectomy for renal cell carcinoma   -20 Dr Caity Teixeira cm -margins free. MRI abd and CXR 21-Dr. Denise no recurrence or metastatic disease.  Previously followed with Dr. Onel Nevarez   -May need to establish with a new oncologist     DJD right knee  - Xray R knee 11/18/21--severe DJD. Dr Belkis Johnson gave brianna shot 11/18/21. S/p gout right big toe   9/2020 and 11/2021, prednisone rx.  uric acid 9/16/20 elev 6.8 (ref range 2.6-6.0). Declined prev med. Carotid bruits  -bilat. [facility] screening 10/3/17-carotids mild bilat--> mild bilat carotids on [facility] 9/26/18.      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 Kalpesh Delcid D deficiency  Vitamin D level 20.6  - Start vitamin D3/cholecalciferol 2000 units once a day

## 2023-10-03 ENCOUNTER — LAB ENCOUNTER (OUTPATIENT)
Dept: LAB | Age: 78
End: 2023-10-03
Attending: INTERNAL MEDICINE
Payer: MEDICARE

## 2023-10-03 ENCOUNTER — OFFICE VISIT (OUTPATIENT)
Dept: INTERNAL MEDICINE CLINIC | Facility: CLINIC | Age: 78
End: 2023-10-03

## 2023-10-03 VITALS
DIASTOLIC BLOOD PRESSURE: 70 MMHG | WEIGHT: 164 LBS | SYSTOLIC BLOOD PRESSURE: 138 MMHG | HEIGHT: 62 IN | OXYGEN SATURATION: 97 % | HEART RATE: 87 BPM | BODY MASS INDEX: 30.18 KG/M2

## 2023-10-03 DIAGNOSIS — Z98.890 POST-OPERATIVE STATE: ICD-10-CM

## 2023-10-03 DIAGNOSIS — D72.829 LEUKOCYTOSIS, UNSPECIFIED TYPE: ICD-10-CM

## 2023-10-03 DIAGNOSIS — I25.5 ISCHEMIC CARDIOMYOPATHY: ICD-10-CM

## 2023-10-03 DIAGNOSIS — Z98.890 POST-OPERATIVE STATE: Primary | ICD-10-CM

## 2023-10-03 DIAGNOSIS — N18.9 CHRONIC KIDNEY DISEASE, UNSPECIFIED CKD STAGE: ICD-10-CM

## 2023-10-03 DIAGNOSIS — N18.4 CKD (CHRONIC KIDNEY DISEASE), STAGE IV (HCC): ICD-10-CM

## 2023-10-03 DIAGNOSIS — I10 ESSENTIAL HYPERTENSION: ICD-10-CM

## 2023-10-03 DIAGNOSIS — E78.2 HYPERLIPIDEMIA, MIXED: ICD-10-CM

## 2023-10-03 DIAGNOSIS — E11.9 TYPE 2 DIABETES MELLITUS WITHOUT COMPLICATION, WITHOUT LONG-TERM CURRENT USE OF INSULIN (HCC): ICD-10-CM

## 2023-10-03 DIAGNOSIS — I48.91 ATRIAL FIBRILLATION, UNSPECIFIED TYPE (HCC): ICD-10-CM

## 2023-10-03 LAB
ALBUMIN SERPL-MCNC: 3.6 G/DL (ref 3.4–5)
ANION GAP SERPL CALC-SCNC: 7 MMOL/L (ref 0–18)
BASOPHILS # BLD AUTO: 0.05 X10(3) UL (ref 0–0.2)
BASOPHILS NFR BLD AUTO: 0.5 %
BUN BLD-MCNC: 23 MG/DL (ref 7–18)
BUN/CREAT SERPL: 14.1 (ref 10–20)
CALCIUM BLD-MCNC: 8.7 MG/DL (ref 8.5–10.1)
CHLORIDE SERPL-SCNC: 110 MMOL/L (ref 98–112)
CO2 SERPL-SCNC: 24 MMOL/L (ref 21–32)
CREAT BLD-MCNC: 1.63 MG/DL
DEPRECATED RDW RBC AUTO: 41.4 FL (ref 35.1–46.3)
EGFRCR SERPLBLD CKD-EPI 2021: 32 ML/MIN/1.73M2 (ref 60–?)
EOSINOPHIL # BLD AUTO: 0.44 X10(3) UL (ref 0–0.7)
EOSINOPHIL NFR BLD AUTO: 4.2 %
ERYTHROCYTE [DISTWIDTH] IN BLOOD BY AUTOMATED COUNT: 12.3 % (ref 11–15)
GLUCOSE BLD-MCNC: 135 MG/DL (ref 70–99)
HCT VFR BLD AUTO: 36.6 %
HGB BLD-MCNC: 12.1 G/DL
IMM GRANULOCYTES # BLD AUTO: 0.04 X10(3) UL (ref 0–1)
IMM GRANULOCYTES NFR BLD: 0.4 %
LYMPHOCYTES # BLD AUTO: 1.2 X10(3) UL (ref 1–4)
LYMPHOCYTES NFR BLD AUTO: 11.4 %
MAGNESIUM SERPL-MCNC: 2 MG/DL (ref 1.6–2.6)
MCH RBC QN AUTO: 30.3 PG (ref 26–34)
MCHC RBC AUTO-ENTMCNC: 33.1 G/DL (ref 31–37)
MCV RBC AUTO: 91.5 FL
MONOCYTES # BLD AUTO: 0.69 X10(3) UL (ref 0.1–1)
MONOCYTES NFR BLD AUTO: 6.6 %
NEUTROPHILS # BLD AUTO: 8.07 X10 (3) UL (ref 1.5–7.7)
NEUTROPHILS # BLD AUTO: 8.07 X10(3) UL (ref 1.5–7.7)
NEUTROPHILS NFR BLD AUTO: 76.9 %
OSMOLALITY SERPL CALC.SUM OF ELEC: 298 MOSM/KG (ref 275–295)
PHOSPHATE SERPL-MCNC: 3.9 MG/DL (ref 2.5–4.9)
PLATELET # BLD AUTO: 264 10(3)UL (ref 150–450)
POTASSIUM SERPL-SCNC: 4.6 MMOL/L (ref 3.5–5.1)
RBC # BLD AUTO: 4 X10(6)UL
SODIUM SERPL-SCNC: 141 MMOL/L (ref 136–145)
WBC # BLD AUTO: 10.5 X10(3) UL (ref 4–11)

## 2023-10-03 PROCEDURE — 1111F DSCHRG MED/CURRENT MED MERGE: CPT | Performed by: INTERNAL MEDICINE

## 2023-10-03 PROCEDURE — 85025 COMPLETE CBC W/AUTO DIFF WBC: CPT

## 2023-10-03 PROCEDURE — 36415 COLL VENOUS BLD VENIPUNCTURE: CPT

## 2023-10-03 PROCEDURE — 99495 TRANSJ CARE MGMT MOD F2F 14D: CPT | Performed by: INTERNAL MEDICINE

## 2023-10-03 PROCEDURE — 80069 RENAL FUNCTION PANEL: CPT

## 2023-10-03 PROCEDURE — 83735 ASSAY OF MAGNESIUM: CPT

## 2023-10-04 ENCOUNTER — TELEPHONE (OUTPATIENT)
Dept: NEPHROLOGY | Facility: CLINIC | Age: 78
End: 2023-10-04

## 2023-10-04 DIAGNOSIS — N18.9 CHRONIC KIDNEY DISEASE, UNSPECIFIED CKD STAGE: Primary | ICD-10-CM

## 2023-10-04 NOTE — TELEPHONE ENCOUNTER
----- Message from Lauren Godwin MD sent at 10/4/2023  9:15 AM CDT -----  Pls let pt know that her kidney fxn is stable after the surgery. Keep Feb appt with me.   Labs ordered for then

## 2023-10-05 ENCOUNTER — TELEPHONE (OUTPATIENT)
Dept: INTERNAL MEDICINE CLINIC | Facility: CLINIC | Age: 78
End: 2023-10-05

## 2023-10-05 NOTE — TELEPHONE ENCOUNTER
Please notify patient that I reviewed the blood work from 10/3:    Her electrolytes look good, kidney function    No other new recommendations for now, she is doing very well since her surgery and should keep up the good work

## 2023-10-05 NOTE — PROGRESS NOTES
Multiple attempts to reach pt and messages left with no return call. Patient went in for HFU appt with PCP on 10/3/23. Encounter closing.

## 2023-11-01 RX ORDER — AMLODIPINE BESYLATE 5 MG/1
5 TABLET ORAL DAILY
Qty: 90 TABLET | Refills: 3 | Status: SHIPPED | OUTPATIENT
Start: 2023-11-01

## 2023-11-01 NOTE — TELEPHONE ENCOUNTER
Refill request is for a maintenance medication and has met the criteria specified in the Ambulatory Medication Refill Standing Order for eligibility, visits, laboratory, alerts and was sent to the requested pharmacy. Requested Prescriptions     Signed Prescriptions Disp Refills    amLODIPine 5 MG Oral Tab 90 tablet 3     Sig: Take 1 tablet (5 mg total) by mouth daily.      Authorizing Provider: Keyon Tripathi     Ordering User: Danielle Hermosillo

## 2023-12-01 NOTE — PATIENT INSTRUCTIONS
You are seen in clinic today for follow-up. We are happy to hear that you were doing well since last visit, with overall improvement of your pain symptoms  - We are checking blood test, nonfasting for follow-up. OK to obtain after the holidays in January  - Periodically check your blood pressures at home  Continue with home physical therapy exercises as recommended    Follow-up with Thang Saenz of cardiology  - No definitive abnormal heart rhythm was noted on examination  - Monitor for any palpitations, chest pain  - Continue the same cardiac medications for now    You may be due for diabetic eye examination with ophthalmology    Follow-up with Dr. John Hassan    Keep up with the good work on physical therapy, exercise, stretches at home  - The goal is to keep the back as controlled as possible  - Monitor the knee pains for now    Also monitor for the congestion, you may consider use of allergy medication such as Claritin/Allegra/Zyrtec once a day  - Notify us if no improvement over the next few days.     Return to clinic in 4 months for follow-up

## 2023-12-04 ENCOUNTER — OFFICE VISIT (OUTPATIENT)
Dept: INTERNAL MEDICINE CLINIC | Facility: CLINIC | Age: 78
End: 2023-12-04
Payer: MEDICARE

## 2023-12-04 VITALS
DIASTOLIC BLOOD PRESSURE: 60 MMHG | HEART RATE: 52 BPM | OXYGEN SATURATION: 98 % | TEMPERATURE: 98 F | SYSTOLIC BLOOD PRESSURE: 138 MMHG

## 2023-12-04 DIAGNOSIS — I48.91 ATRIAL FIBRILLATION, UNSPECIFIED TYPE (HCC): Primary | ICD-10-CM

## 2023-12-04 DIAGNOSIS — E11.9 TYPE 2 DIABETES MELLITUS WITHOUT COMPLICATION, WITHOUT LONG-TERM CURRENT USE OF INSULIN (HCC): ICD-10-CM

## 2023-12-04 DIAGNOSIS — N18.4 CKD (CHRONIC KIDNEY DISEASE), STAGE IV (HCC): ICD-10-CM

## 2023-12-04 DIAGNOSIS — E55.9 VITAMIN D DEFICIENCY: ICD-10-CM

## 2023-12-04 DIAGNOSIS — I10 ESSENTIAL HYPERTENSION: ICD-10-CM

## 2023-12-04 DIAGNOSIS — I25.5 ISCHEMIC CARDIOMYOPATHY: ICD-10-CM

## 2023-12-04 DIAGNOSIS — E78.2 HYPERLIPIDEMIA, MIXED: ICD-10-CM

## 2023-12-04 DIAGNOSIS — D72.829 LEUKOCYTOSIS, UNSPECIFIED TYPE: ICD-10-CM

## 2023-12-04 PROCEDURE — 99214 OFFICE O/P EST MOD 30 MIN: CPT | Performed by: INTERNAL MEDICINE

## 2024-01-11 ENCOUNTER — LAB ENCOUNTER (OUTPATIENT)
Dept: LAB | Age: 79
End: 2024-01-11
Attending: INTERNAL MEDICINE
Payer: MEDICARE

## 2024-01-11 DIAGNOSIS — E11.9 TYPE 2 DIABETES MELLITUS WITHOUT COMPLICATION, WITHOUT LONG-TERM CURRENT USE OF INSULIN (HCC): ICD-10-CM

## 2024-01-11 DIAGNOSIS — N18.4 CKD (CHRONIC KIDNEY DISEASE), STAGE IV (HCC): ICD-10-CM

## 2024-01-11 DIAGNOSIS — I48.91 ATRIAL FIBRILLATION, UNSPECIFIED TYPE (HCC): ICD-10-CM

## 2024-01-11 DIAGNOSIS — E55.9 VITAMIN D DEFICIENCY: ICD-10-CM

## 2024-01-11 DIAGNOSIS — D72.829 LEUKOCYTOSIS, UNSPECIFIED TYPE: ICD-10-CM

## 2024-01-11 LAB
ANION GAP SERPL CALC-SCNC: 8 MMOL/L (ref 0–18)
BASOPHILS # BLD AUTO: 0.04 X10(3) UL (ref 0–0.2)
BASOPHILS NFR BLD AUTO: 0.5 %
BUN BLD-MCNC: 24 MG/DL (ref 9–23)
BUN/CREAT SERPL: 15.7 (ref 10–20)
CALCIUM BLD-MCNC: 9.4 MG/DL (ref 8.7–10.4)
CHLORIDE SERPL-SCNC: 108 MMOL/L (ref 98–112)
CO2 SERPL-SCNC: 25 MMOL/L (ref 21–32)
CREAT BLD-MCNC: 1.53 MG/DL
DEPRECATED RDW RBC AUTO: 45.4 FL (ref 35.1–46.3)
EGFRCR SERPLBLD CKD-EPI 2021: 34 ML/MIN/1.73M2 (ref 60–?)
EOSINOPHIL # BLD AUTO: 0.3 X10(3) UL (ref 0–0.7)
EOSINOPHIL NFR BLD AUTO: 3.4 %
ERYTHROCYTE [DISTWIDTH] IN BLOOD BY AUTOMATED COUNT: 13.4 % (ref 11–15)
EST. AVERAGE GLUCOSE BLD GHB EST-MCNC: 180 MG/DL (ref 68–126)
FASTING STATUS PATIENT QL REPORTED: YES
GLUCOSE BLD-MCNC: 117 MG/DL (ref 70–99)
HBA1C MFR BLD: 7.9 % (ref ?–5.7)
HCT VFR BLD AUTO: 41.2 %
HGB BLD-MCNC: 13.3 G/DL
IMM GRANULOCYTES # BLD AUTO: 0.02 X10(3) UL (ref 0–1)
IMM GRANULOCYTES NFR BLD: 0.2 %
LYMPHOCYTES # BLD AUTO: 1.02 X10(3) UL (ref 1–4)
LYMPHOCYTES NFR BLD AUTO: 11.6 %
MAGNESIUM SERPL-MCNC: 2.2 MG/DL (ref 1.6–2.6)
MCH RBC QN AUTO: 29.6 PG (ref 26–34)
MCHC RBC AUTO-ENTMCNC: 32.3 G/DL (ref 31–37)
MCV RBC AUTO: 91.6 FL
MONOCYTES # BLD AUTO: 0.57 X10(3) UL (ref 0.1–1)
MONOCYTES NFR BLD AUTO: 6.5 %
NEUTROPHILS # BLD AUTO: 6.81 X10 (3) UL (ref 1.5–7.7)
NEUTROPHILS # BLD AUTO: 6.81 X10(3) UL (ref 1.5–7.7)
NEUTROPHILS NFR BLD AUTO: 77.8 %
OSMOLALITY SERPL CALC.SUM OF ELEC: 297 MOSM/KG (ref 275–295)
PLATELET # BLD AUTO: 228 10(3)UL (ref 150–450)
POTASSIUM SERPL-SCNC: 4.9 MMOL/L (ref 3.5–5.1)
RBC # BLD AUTO: 4.5 X10(6)UL
SODIUM SERPL-SCNC: 141 MMOL/L (ref 136–145)
VIT D+METAB SERPL-MCNC: 13.3 NG/ML (ref 30–100)
WBC # BLD AUTO: 8.8 X10(3) UL (ref 4–11)

## 2024-01-11 PROCEDURE — 80048 BASIC METABOLIC PNL TOTAL CA: CPT

## 2024-01-11 PROCEDURE — 36415 COLL VENOUS BLD VENIPUNCTURE: CPT

## 2024-01-11 PROCEDURE — 85025 COMPLETE CBC W/AUTO DIFF WBC: CPT

## 2024-01-11 PROCEDURE — 83735 ASSAY OF MAGNESIUM: CPT

## 2024-01-11 PROCEDURE — 83036 HEMOGLOBIN GLYCOSYLATED A1C: CPT

## 2024-01-11 PROCEDURE — 82306 VITAMIN D 25 HYDROXY: CPT

## 2024-01-12 RX ORDER — ERGOCALCIFEROL 1.25 MG/1
50000 CAPSULE ORAL WEEKLY
Qty: 8 CAPSULE | Refills: 0 | Status: SHIPPED | OUTPATIENT
Start: 2024-01-12 | End: 2024-02-11

## 2024-01-12 NOTE — TELEPHONE ENCOUNTER
Please inform patient that her A1c did bump up from 7.0 to 7.9%.  She tried to cut down on carbohydrates, sweets, rice/green/pasta.  Plan to repeat A1c at her next visit in April    Kidney function remains stable    Vitamin D levels are low, I do recommend temporary increase of vitamin D supplementation: 50,000 units once a week x 8 weeks  (please confirm pharmacy)

## 2024-01-12 NOTE — TELEPHONE ENCOUNTER
Patient informed of test results and all Dr. Guerra' recommendations. Voiced understanding. RX sent for Vit D as pended by Dr. Guerra. Pharmacy confirmed.

## 2024-02-15 ENCOUNTER — LAB ENCOUNTER (OUTPATIENT)
Dept: LAB | Age: 79
End: 2024-02-15
Attending: INTERNAL MEDICINE
Payer: MEDICARE

## 2024-02-15 DIAGNOSIS — N18.9 CHRONIC KIDNEY DISEASE, UNSPECIFIED CKD STAGE: ICD-10-CM

## 2024-02-15 LAB
ALBUMIN SERPL-MCNC: 4.2 G/DL (ref 3.2–4.8)
ANION GAP SERPL CALC-SCNC: 8 MMOL/L (ref 0–18)
BILIRUB UR QL: NEGATIVE
BUN BLD-MCNC: 23 MG/DL (ref 9–23)
BUN/CREAT SERPL: 13.5 (ref 10–20)
CALCIUM BLD-MCNC: 9 MG/DL (ref 8.7–10.4)
CHLORIDE SERPL-SCNC: 110 MMOL/L (ref 98–112)
CO2 SERPL-SCNC: 24 MMOL/L (ref 21–32)
COLOR UR: YELLOW
CREAT BLD-MCNC: 1.71 MG/DL
CREAT UR-SCNC: 100.2 MG/DL
EGFRCR SERPLBLD CKD-EPI 2021: 30 ML/MIN/1.73M2 (ref 60–?)
GLUCOSE BLD-MCNC: 145 MG/DL (ref 70–99)
GLUCOSE UR-MCNC: 1000 MG/DL
HGB UR QL STRIP.AUTO: NEGATIVE
KETONES UR-MCNC: NEGATIVE MG/DL
LEUKOCYTE ESTERASE UR QL STRIP.AUTO: 500
MICROALBUMIN UR-MCNC: 49.9 MG/DL
MICROALBUMIN/CREAT 24H UR-RTO: 498 UG/MG (ref ?–30)
OSMOLALITY SERPL CALC.SUM OF ELEC: 300 MOSM/KG (ref 275–295)
PH UR: 5.5 [PH] (ref 5–8)
PHOSPHATE SERPL-MCNC: 4.2 MG/DL (ref 2.4–5.1)
POTASSIUM SERPL-SCNC: 4.7 MMOL/L (ref 3.5–5.1)
PROT UR-MCNC: 70 MG/DL
PTH-INTACT SERPL-MCNC: 243.4 PG/ML (ref 18.5–88)
SODIUM SERPL-SCNC: 142 MMOL/L (ref 136–145)
SP GR UR STRIP: 1.02 (ref 1–1.03)
UROBILINOGEN UR STRIP-ACNC: NORMAL
WBC #/AREA URNS AUTO: >50 /HPF
WBC CLUMPS UR QL AUTO: PRESENT /HPF

## 2024-02-15 PROCEDURE — 80069 RENAL FUNCTION PANEL: CPT

## 2024-02-15 PROCEDURE — 36415 COLL VENOUS BLD VENIPUNCTURE: CPT

## 2024-02-15 PROCEDURE — 83970 ASSAY OF PARATHORMONE: CPT

## 2024-02-15 PROCEDURE — 82043 UR ALBUMIN QUANTITATIVE: CPT

## 2024-02-15 PROCEDURE — 81001 URINALYSIS AUTO W/SCOPE: CPT

## 2024-02-15 PROCEDURE — 82570 ASSAY OF URINE CREATININE: CPT

## 2024-02-16 ENCOUNTER — TELEPHONE (OUTPATIENT)
Dept: NEPHROLOGY | Facility: CLINIC | Age: 79
End: 2024-02-16

## 2024-02-16 NOTE — TELEPHONE ENCOUNTER
----- Message from Sonal Salomon MD sent at 2/16/2024 10:04 AM CST -----  Would you please let Ms. Kingston know that we got her labs, I will talk to her more about them at the upcoming appointment.  Kidney function looks stable.    However she had some white blood cells in her urine, this can be common in postmenopausal women.  I just want to know if she has any UTI symptoms.  If she does not then we will monitor it.  If she does I will give her short course of antibiotics

## 2024-02-21 ENCOUNTER — TELEPHONE (OUTPATIENT)
Dept: HEMATOLOGY/ONCOLOGY | Facility: HOSPITAL | Age: 79
End: 2024-02-21

## 2024-02-21 ENCOUNTER — OFFICE VISIT (OUTPATIENT)
Facility: CLINIC | Age: 79
End: 2024-02-21
Payer: MEDICARE

## 2024-02-21 ENCOUNTER — TELEPHONE (OUTPATIENT)
Facility: CLINIC | Age: 79
End: 2024-02-21

## 2024-02-21 VITALS
DIASTOLIC BLOOD PRESSURE: 60 MMHG | SYSTOLIC BLOOD PRESSURE: 138 MMHG | BODY MASS INDEX: 30 KG/M2 | WEIGHT: 163 LBS | OXYGEN SATURATION: 97 % | HEART RATE: 60 BPM

## 2024-02-21 DIAGNOSIS — Z90.5 SOLITARY KIDNEY, ACQUIRED: ICD-10-CM

## 2024-02-21 DIAGNOSIS — C64.1 RENAL CELL CARCINOMA OF RIGHT KIDNEY (HCC): Primary | ICD-10-CM

## 2024-02-21 DIAGNOSIS — N18.32 STAGE 3B CHRONIC KIDNEY DISEASE (HCC): ICD-10-CM

## 2024-02-21 DIAGNOSIS — E11.21 DIABETIC GLOMERULOPATHY (HCC): ICD-10-CM

## 2024-02-21 DIAGNOSIS — I50.22 CHRONIC SYSTOLIC HEART FAILURE (HCC): ICD-10-CM

## 2024-02-21 DIAGNOSIS — I12.9 HYPERTENSIVE KIDNEY DISEASE WITH STAGE 3B CHRONIC KIDNEY DISEASE (HCC): ICD-10-CM

## 2024-02-21 DIAGNOSIS — N18.32 HYPERTENSIVE KIDNEY DISEASE WITH STAGE 3B CHRONIC KIDNEY DISEASE (HCC): ICD-10-CM

## 2024-02-21 PROCEDURE — 99214 OFFICE O/P EST MOD 30 MIN: CPT | Performed by: INTERNAL MEDICINE

## 2024-02-21 NOTE — TELEPHONE ENCOUNTER
New Consult for Dr. Thakkar, patient did not know the Diagnosis Dr. Salomon was sending her for. I placed a call to Dr. Salomon for the Diagnosis. Called 2/21/24

## 2024-02-21 NOTE — TELEPHONE ENCOUNTER
Dr Salomon office is calling to let us know the DX code for the referral  Renal Cell Carcinoma of right kidney. Please call pt to make an appt-NL

## 2024-02-21 NOTE — PROGRESS NOTES
Progress Note     Paola Kingston    Here for follow-up. Had posterior spinal fusion on 9/21/24 by Dr Green    Accompanied by her son Barry      HISTORY:  Past Medical History:   Diagnosis Date    Adenomatous colon polyp 2013    colonoscopy 3/13 Dr. Martínez-also diverticulosis and int hem.    Allergic conjunctivitis 2014    Dr. Dodd    Back problem     CAD (coronary artery disease) 05/2022    Cardiac cath 5/27/22-Dr. Swenson-stents placed to LAD and diagonal.     Congestive heart disease (HCC)     Diabetes (HCC) 2010    Diabetes mellitus (HCC)     Diverticulosis 2001    Essential hypertension 2000    Glaucoma     Gout     Hyperlipidemia     Hypertension, essential     Internal hemorrhoids 2001 and 2013    Ischemic cardiomyopathy 05/2022    LBBB (left bundle branch block) 2014    nl stress echo 6/14-Dr. Jones    Osteopenia 2001    f/u dexa nl in 9/14    Renal cell carcinoma (HCC) 2020     robotic asissted R radical nephrectomy 9/2/20 Dr Denise.    Type 2 diabetes mellitus (HCC)       Past Surgical History:   Procedure Laterality Date    CATARACT EXTRACTION Bilateral 2014    Dr. Dodd    CHOLECYSTECTOMY  1981    at [facility]    COLONOSCOPY N/A 3/9/2018    Procedure: COLONOSCOPY, POSSIBLE BIOPSY, POSSIBLE POLYPECTOMY 50986;  Surgeon: Jersey Martínez MD;  Location: [facility], [facility] LOCALIZATION WIRE 1 SITE LEFT (CPT=19281)  1998    NEPHRECTOMY Right 09/02/2020     robotic asissted R radical nephrectomy 9/2/20 Dr Denise.           Medications (Active prior to today's visit):  Current Outpatient Medications   Medication Sig Dispense Refill    insulin glargine 100 UNIT/ML Subcutaneous Solution Pen-injector Inject 28 Units into the skin every morning. 25.2 mL 3    amLODIPine 5 MG Oral Tab Take 1 tablet (5 mg total) by mouth daily. 90 tablet 3    acetaminophen 325 MG Oral Tab Take 1 tablet (325 mg total) by mouth every 6 (six) hours as needed for Pain.      hydrALAZINE 50 MG Oral Tab Take 1  tablet (50 mg total) by mouth 3 (three) times daily. 270 tablet 3    furosemide 20 MG Oral Tab Take 1 tablet (20 mg total) by mouth daily. 5/4 restarted 90 tablet 3    isosorbide mononitrate ER 30 MG Oral Tablet 24 Hr Take 1 tablet (30 mg total) by mouth daily. 90 tablet 3    rosuvastatin 20 MG Oral Tab Take 1 tablet (20 mg total) by mouth nightly. 90 tablet 0    sacubitril-valsartan  MG Oral Tab Take 1 tablet by mouth 2 (two) times daily.      empagliflozin (JARDIANCE) 10 MG Oral Tab Take 1 tablet (10 mg total) by mouth daily. 90 tablet 3    metoprolol succinate ER 50 MG Oral Tablet 24 Hr Take 1 tablet (50 mg total) by mouth 2 (two) times a day. 180 tablet 3    RHOPRESSA 0.02 % Ophthalmic Solution       aspirin 81 MG Oral Tab EC Take 1 tablet (81 mg total) by mouth daily.  0    LUMIGAN 0.01 % Ophthalmic Solution Place 1 drop into both eyes daily.      Glucose Blood (FREESTYLE LITE TEST) In Vitro Strip 1 strip by In Vitro route 3 (three) times daily. 300 strip 3    Insulin Pen Needle (BD PEN NEEDLE MINI U/F) 31G X 5 MM Does not apply Misc Use three times daily with insulin. 300 each 3       Allergies:  Allergies   Allergen Reactions    Bactrim [Sulfamethoxazole W/Trimethoprim] NAUSEA AND VOMITING     Nausea and vomiting in 7/2018    Atorvastatin      Other reaction(s): ears ring         ROS:     Constitutional:  Negative for decreased activity, fever, irritability and lethargy  ENMT:  Negative for ear drainage, hearing loss and nasal drainage  Eyes:  Negative for eye discharge and vision loss  Cardiovascular:  Negative for chest pain, sob  Respiratory:  Negative for cough, dyspnea and wheezing  Gastrointestinal:  Negative for abdominal pain, constipation  Genitourinary:  Negative for dysuria and hematuria  Endocrine:  Negative for abnormal sleep patterns  Hema/Lymph:  Negative for easy bleeding and easy bruising  Integumentary:  Negative for pruritus and rash  Musculoskeletal:  Negative for bone/joint  symptoms  Neurological:  Negative for gait disturbance  Psychiatric:  Negative for inappropriate interaction and psychiatric symptoms      Vitals:    02/21/24 1118   BP: 138/60   Pulse: 60       PHYSICAL EXAM:   Constitutional: appears well hydrated alert and responsive   Head/Face: normocephalic  Eyes/Vision: normal extraocular motion is intact  Nose/Mouth/Throat:mucous membranes are moist   Neck/Thyroid: neck is supple without adenopathy  Lymphatic: no abnormal cervical, supraclavicular adenopathy is noted  Respiratory:  lungs are clear to auscultation bilaterally  Cardiovascular: regular rate and rhythm   Abdomen: soft, non-tender, non-distended, BS normal  Skin/Hair: no unusual rashes present, no abnormal bruising noted  Musculoskeletal: no deformities  Extremities: no edema  Neurological:  Grossly normal       Lab Results   Component Value Date     (H) 02/15/2024     02/15/2024    K 4.7 02/15/2024     02/15/2024    CO2 24.0 02/15/2024    ANIONGAP 8 02/15/2024    BUN 23 02/15/2024    CREATSERUM 1.71 (H) 02/15/2024    CA 9.0 02/15/2024    OSMOCALC 300 (H) 02/15/2024    EGFRCR 30 (L) 02/15/2024    ALB 4.2 02/15/2024    PHOS 4.2 02/15/2024         ASSESSMENT/PLAN:   Assessment   1. Stage 3b chronic kidney disease (HCC)  GFR is stable now    2. Renal cell carcinoma of right kidney (HCC)  S/p right radical nephrectomy 9/2020  The patient used to follow-up with Dr. Viveros, but her last visit was in 2021.  I recommended to her that she follow-up with them 1 more time to see if there is any additional surveillance that needs to be done.    No hematuria noted on the urinalysis    3. Solitary kidney, acquired  As above    4. Hypertensive kidney disease with chronic kidney disease stage IV (HCC)  Blood pressure stable on amlodipine, hydralazine, Entresto, metoprolol, isosorbide mononitrate    5. Diabetic glomerulopathy (HCC)  On insulin and Jardiance    6. Chronic systolic heart failure (HCC)  Stable on  Lasix 6.  She also takes Jardiance           Orders This Visit:  No orders of the defined types were placed in this encounter.      Meds This Visit:  Requested Prescriptions      No prescriptions requested or ordered in this encounter       Imaging & Referrals:  None     2/21/2024   CATHRYN MAYA MD    Return in about 6 months (around 8/21/2024).

## 2024-03-05 RX ORDER — EMPAGLIFLOZIN 10 MG/1
10 TABLET, FILM COATED ORAL DAILY
Qty: 90 TABLET | Refills: 3 | OUTPATIENT
Start: 2024-03-05

## 2024-03-05 RX ORDER — SACUBITRIL AND VALSARTAN 97; 103 MG/1; MG/1
TABLET, FILM COATED ORAL
Qty: 60 TABLET | Refills: 11 | OUTPATIENT
Start: 2024-03-05

## 2024-03-08 ENCOUNTER — OFFICE VISIT (OUTPATIENT)
Dept: HEMATOLOGY/ONCOLOGY | Facility: HOSPITAL | Age: 79
End: 2024-03-08
Attending: INTERNAL MEDICINE
Payer: MEDICARE

## 2024-03-08 VITALS
OXYGEN SATURATION: 97 % | WEIGHT: 160 LBS | TEMPERATURE: 98 F | HEIGHT: 62.5 IN | DIASTOLIC BLOOD PRESSURE: 38 MMHG | SYSTOLIC BLOOD PRESSURE: 136 MMHG | HEART RATE: 56 BPM | RESPIRATION RATE: 18 BRPM | BODY MASS INDEX: 28.71 KG/M2

## 2024-03-08 DIAGNOSIS — C64.1 RENAL CELL CARCINOMA OF RIGHT KIDNEY (HCC): Primary | ICD-10-CM

## 2024-03-08 DIAGNOSIS — Z90.5 S/P NEPHRECTOMY: ICD-10-CM

## 2024-03-08 PROCEDURE — 99214 OFFICE O/P EST MOD 30 MIN: CPT | Performed by: INTERNAL MEDICINE

## 2024-03-08 NOTE — PROGRESS NOTES
Oncology Consult      Chief Complaint:  RCC    Oncology History:  78 year old referred by Dr. Salomon RCC, clear type, grade 1 pT2b post nephrectomy 2020    She has been followed on surveillance without recurrence    Past Medical History:  Past Medical History:   Diagnosis Date    Adenomatous colon polyp     colonoscopy 3/13 Dr. Martínez-also diverticulosis and int hem.    Allergic conjunctivitis     Dr. Dodd    Back problem     CAD (coronary artery disease) 2022    Cardiac cath 22-Dr. Swenson-stents placed to LAD and diagonal.     Congestive heart disease (HCC)     Diabetes (HCC)     Diabetes mellitus (HCC)     Diverticulosis     Essential hypertension     Glaucoma     Gout     Hyperlipidemia     Hypertension, essential     Internal hemorrhoids  and     Ischemic cardiomyopathy 2022    LBBB (left bundle branch block)     nl stress echo -Dr. Jones    Osteopenia     f/u dexa nl in     Renal cell carcinoma (HCC)      robotic asissted R radical nephrectomy 20 Dr Denise.    Type 2 diabetes mellitus (HCC)        Past Surgical History:  Past Surgical History:   Procedure Laterality Date    CATARACT EXTRACTION Bilateral     Dr. Dodd    CHOLECYSTECTOMY  1981    at [facility]    COLONOSCOPY N/A 3/9/2018    Procedure: COLONOSCOPY, POSSIBLE BIOPSY, POSSIBLE POLYPECTOMY 13058;  Surgeon: Jersey Martínez MD;  [place] [facility] [place] [facility] LOCALIZATION WIRE 1 SITE LEFT (CPT=19281)      NEPHRECTOMY Right 2020     robotic asissted R radical nephrectomy 20 Dr Denise.       Family History:  Family History   Problem Relation Age of Onset    Stroke Father          age 80    Other (unknown cause of death) Mother          age 83    Diabetes Mother     Hypertension Brother     Other (1 brother) Other         living and well    Other (1 son, 1 daughter) Other     Breast Cancer Neg        Social History:  Unchanged from  consult note    Current Medications:    Current Outpatient Medications:     insulin glargine 100 UNIT/ML Subcutaneous Solution Pen-injector, Inject 28 Units into the skin every morning., Disp: 25.2 mL, Rfl: 3    amLODIPine 5 MG Oral Tab, Take 1 tablet (5 mg total) by mouth daily., Disp: 90 tablet, Rfl: 3    acetaminophen 325 MG Oral Tab, Take 1 tablet (325 mg total) by mouth every 6 (six) hours as needed for Pain., Disp: , Rfl:     hydrALAZINE 50 MG Oral Tab, Take 1 tablet (50 mg total) by mouth 3 (three) times daily., Disp: 270 tablet, Rfl: 3    furosemide 20 MG Oral Tab, Take 1 tablet (20 mg total) by mouth daily. 5/4 restarted, Disp: 90 tablet, Rfl: 3    isosorbide mononitrate ER 30 MG Oral Tablet 24 Hr, Take 1 tablet (30 mg total) by mouth daily., Disp: 90 tablet, Rfl: 3    rosuvastatin 20 MG Oral Tab, Take 1 tablet (20 mg total) by mouth nightly., Disp: 90 tablet, Rfl: 0    Glucose Blood (FREESTYLE LITE TEST) In Vitro Strip, 1 strip by In Vitro route 3 (three) times daily., Disp: 300 strip, Rfl: 3    sacubitril-valsartan  MG Oral Tab, Take 1 tablet by mouth 2 (two) times daily., Disp: , Rfl:     empagliflozin (JARDIANCE) 10 MG Oral Tab, Take 1 tablet (10 mg total) by mouth daily., Disp: 90 tablet, Rfl: 3    Insulin Pen Needle (BD PEN NEEDLE MINI U/F) 31G X 5 MM Does not apply Misc, Use three times daily with insulin., Disp: 300 each, Rfl: 3    metoprolol succinate ER 50 MG Oral Tablet 24 Hr, Take 1 tablet (50 mg total) by mouth 2 (two) times a day., Disp: 180 tablet, Rfl: 3    RHOPRESSA 0.02 % Ophthalmic Solution, , Disp: , Rfl:     aspirin 81 MG Oral Tab EC, Take 1 tablet (81 mg total) by mouth daily., Disp: , Rfl: 0    LUMIGAN 0.01 % Ophthalmic Solution, Place 1 drop into both eyes daily., Disp: , Rfl:     Allergies:  Allergies   Allergen Reactions    Bactrim [Sulfamethoxazole W/Trimethoprim] NAUSEA AND VOMITING     Nausea and vomiting in 7/2018    Atorvastatin      Other reaction(s): ears ring         Review of Systems:  All other systems reviewed and negative x10 except as listed above    Vital Signs:  /38 (BP Location: Right arm, Patient Position: Sitting, Cuff Size: large)   Pulse 56   Temp 98.3 °F (36.8 °C) (Oral)   Resp 18   Ht 1.588 m (5' 2.5\")   Wt 72.6 kg (160 lb)   SpO2 97%   BMI 28.80 kg/m²     Physical Examination:  Performance Status:  General: Patient is alert and oriented x 3, not in acute distress.  HEENT: EOMs intact. Oropharynx is clear.   Neck: No palpable lymphadenopathy. Neck is supple.  Chest: Symmetric expansion, nonlabored breathing  Abdomen: Soft, non tender. ND  Extremities: No edema, cyanosis, or bruising  Neurological: motor strength grossly intact, MA4E  Psych: appropriate mood and affect      Laboratory assessed and reviewed:  Lab Results   Component Value Date     (H) 02/15/2024    BUN 23 02/15/2024    BUNCREA 13.5 02/15/2024    CREATSERUM 1.71 (H) 02/15/2024    ANIONGAP 8 02/15/2024    GFRNAA 30 (L) 07/28/2022    GFRAA 35 (L) 07/28/2022    CA 9.0 02/15/2024    OSMOCALC 300 (H) 02/15/2024    ALKPHO 75 09/12/2023    AST 12 (L) 09/12/2023    ALT 18 09/12/2023    ALKPHOS 62 07/08/2016    BILT 0.6 09/12/2023    TP 6.4 09/12/2023    ALB 4.2 02/15/2024    GLOBULIN 2.8 09/12/2023    AGRATIO 1.7 07/08/2016     02/15/2024    K 4.7 02/15/2024     02/15/2024    CO2 24.0 02/15/2024       Impression and Plan:  Clear cell RCC, grade 1, pT2nx stage II s/p right nephrectomy 9/2/2020 (Dr Denise)  Doing well, continue active surveillance per [facility]CC[facility]       Recommend continuing annual surveillance chest and abdomen imaging we will set up with return to clinic in neck several months.  Surveillance for 5 years planned      Data: moderate, cbc cmp, neph notes, mri results

## 2024-03-28 NOTE — H&P
Chief Complaint:   Chief Complaint   Patient presents with    Follow - Up         HPI:     Ms. FLOYD is a 78 year old female PMHX ischemic cardiomyopathy, CKD stage IV, type 2 diabetes, hypertension, hyperlipidemia, history of renal cell carcinoma status post right nephrectomy, degenerative joint disease coming in for follow-up    30 minute chair exercise. Regular stuff for nutrition. Breakfast usually doesn't have anything. Chicken, pork, beef. Eggs. Fruits, veggies - broccolli, carrots, brussel sprouts. She does try to keep the bread down.Plenty of water.  3 bottles of water.    Sleep is 6-7 hours.     Past Medical History:   Diagnosis Date    Adenomatous colon polyp 2013    colonoscopy 3/13 Dr. Martínez-also diverticulosis and int hem.    Allergic conjunctivitis 2014    Dr. Dodd    Back problem     CAD (coronary artery disease) 05/2022    Cardiac cath 5/27/22-Dr. Swenson-stents placed to LAD and diagonal.     Cancer (HCC) 2020    Congestive heart disease (HCC)     Diabetes (HCC) 2010    Diabetes mellitus (HCC)     Diverticulosis 2001    Essential hypertension 2000    Glaucoma     Gout     Hyperlipidemia     Hypertension, essential     Internal hemorrhoids 2001 and 2013    Ischemic cardiomyopathy 05/2022    LBBB (left bundle branch block) 2014    nl stress echo 6/14-Dr. Jones    Osteopenia 2001    f/u dexa nl in 9/14    Renal cell carcinoma (HCC) 2020     robotic asissted R radical nephrectomy 9/2/20 Dr Denise.    Type 2 diabetes mellitus (HCC)      Past Surgical History:   Procedure Laterality Date    BACK SURGERY  [id number]    CATARACT  1998    CATARACT EXTRACTION Bilateral 2014    Dr. Dodd    CHOLECYSTECTOMY  1981    at [facility]    COLONOSCOPY N/A 03/09/2018    Procedure: COLONOSCOPY, POSSIBLE BIOPSY, POSSIBLE POLYPECTOMY 25774;  Surgeon: Jersey Martínez MD;  Location: [facility] [place] [facility] LOCALIZATION WIRE 1 SITE LEFT (CPT=19281)  1998    NEPHRECTOMY Right 09/02/2020     robotic asissted R  radical nephrectomy 20 Dr Denise.      1968     Social History:  Social History     Socioeconomic History    Marital status:    Tobacco Use    Smoking status: Former     Packs/day: 1.00     Years: 20.00     Additional pack years: 0.00     Total pack years: 20.00     Types: Cigarettes     Quit date: 1993     Years since quittin.6     Passive exposure: Past    Smokeless tobacco: Never   Vaping Use    Vaping Use: Never used   Substance and Sexual Activity    Alcohol use: No    Drug use: No   Other Topics Concern    Caffeine Concern No    Exercise No   Social History Narrative    The patient uses the following assistive device(s):  rolling walker.      The patient does live in a home with stairs.     Family History:  Family History   Problem Relation Age of Onset    Stroke Father          age 80    Other (unknown cause of death) Mother          age 83    Diabetes Mother     Hypertension Brother     Other (1 brother) Other         living and well    Other (1 son, 1 daughter) Other     Breast Cancer Neg      Allergies:  Allergies   Allergen Reactions    Bactrim [Sulfamethoxazole W/Trimethoprim] NAUSEA AND VOMITING     Nausea and vomiting in 2018    Atorvastatin      Other reaction(s): ears ring     Current Meds:  Current Outpatient Medications   Medication Sig Dispense Refill    insulin glargine 100 UNIT/ML Subcutaneous Solution Pen-injector Inject 28 Units into the skin every morning. 25.2 mL 3    amLODIPine 5 MG Oral Tab Take 1 tablet (5 mg total) by mouth daily. 90 tablet 3    acetaminophen 325 MG Oral Tab Take 1 tablet (325 mg total) by mouth every 6 (six) hours as needed for Pain.      hydrALAZINE 50 MG Oral Tab Take 1 tablet (50 mg total) by mouth 3 (three) times daily. 270 tablet 3    furosemide 20 MG Oral Tab Take 1 tablet (20 mg total) by mouth daily. / restarted 90 tablet 3    isosorbide mononitrate ER 30 MG Oral Tablet 24 Hr Take 1 tablet (30 mg total) by mouth daily. 90  tablet 3    rosuvastatin 20 MG Oral Tab Take 1 tablet (20 mg total) by mouth nightly. 90 tablet 0    Glucose Blood (FREESTYLE LITE TEST) In Vitro Strip 1 strip by In Vitro route 3 (three) times daily. 300 strip 3    sacubitril-valsartan  MG Oral Tab Take 1 tablet by mouth 2 (two) times daily.      empagliflozin (JARDIANCE) 10 MG Oral Tab Take 1 tablet (10 mg total) by mouth daily. 90 tablet 3    Insulin Pen Needle (BD PEN NEEDLE MINI U/F) 31G X 5 MM Does not apply Misc Use three times daily with insulin. 300 each 3    metoprolol succinate ER 50 MG Oral Tablet 24 Hr Take 1 tablet (50 mg total) by mouth 2 (two) times a day. 180 tablet 3    RHOPRESSA 0.02 % Ophthalmic Solution 1 drop every morning.      aspirin 81 MG Oral Tab EC Take 1 tablet (81 mg total) by mouth daily.  0    LUMIGAN 0.01 % Ophthalmic Solution Place 1 drop into both eyes daily.        Counseling given: Not Answered       REVIEW OF SYSTEMS:   Positive Findings indicated in BOLD    Constitutional: Fever, Chills, Weight Gain, Weight Loss, Night Sweats, Fatigue, Malaise  ENT/Mouth:  Hearing Changes, Ear Pain, Nasal Congestion, Sinus Pain, Hoarseness, Sore throat, Rhinorrhea, Swallowing Difficulty  Eyes: Eye Pain, Swelling, Redness, Foreign Body, Discharge, Vision Changes  Cardiovascular: Chest Pain, SOB, PND, Dyspnea on Exertion, Orthopnea, Claudication, Edema, Palpitations  Respiratory: Cough, Sputum, Wheezing, Shortness of breath  Gastrointestinal: Nausea, Vomiting, Diarrhea, Constipation, Pain, Heartburn, Dysphagia, Bloody stools, Tarry stools  Genitourinary: Dysmenorrhea, Dysuria, Urinary Frequency, Hematuria, Urinary Incontinence, Urgency,  Flank Pain  Musculoskeletal: Arthralgias, Myalgias, Joint Swelling, Joint Stiffness, Back Pain, Neck Pain  Integumentary: Skin Lesions, Pruritis, Hair Changes, Jaundice, Nail changes  Neuro: Weakness, Numbness, Paresthesias, Loss of Consciousness, Syncope, Dizziness, Headache, Falls  Psych: Anxiety,  Depression, Insomnia, Suicidal Ideation, Homicidal ideation, Memory Changes  Heme/Lymph: Bruising, Bleeding, Lymphadenopathy  Endocrine: Polyuria, Polydipsia, Temperature Intolerance    EXAM:   Vital Signs:  Blood pressure 142/44, pulse 57, temperature 97.9 °F (36.6 °C), temperature source Oral, height 5' 2.5\" (1.588 m), weight 163 lb (73.9 kg), SpO2 98%, not currently breastfeeding.     Constitutional: No acute distress. Alert and oriented x 3. Examined in wheelchair  Eyes: EOMI, PERRLA, clear sclera b/l  HENT: NCAT, Moist mucous membranes, Oropharynx without erythema or exudates  Cardiovascular: S1, S2, no S3, no S4, Regular rate and rhythm, No murmurs/gallops/rubs.   Respiratory: Clear to auscultation bilaterally.  No wheezes/rales/rhonchi  Gastrointestinal: Soft, nontender, nondistended. Positive bowel sounds x 4.   Genitourinary: No CVA tenderness bilaterally  Neurologic: No focal neurological deficits, CN II-XII intact, light touch intact, MSK Strength 5/5 BLE  Musculoskeletal: Full range of motion of all extremities  Skin: No lesions, No erythema, no jaundice, Cap Refill < 2s  Psychiatric: Appropriate mood and affect  Heme/Lymph/Immune: No cervical LAD          DATA REVIEWED   Labs:  Recent Results (from the past 8760 hour(s))   Basic Metabolic Panel (8)    Collection Time: 01/11/24 12:03 PM   Result Value Ref Range    Glucose 117 (H) 70 - 99 mg/dL    Sodium 141 136 - 145 mmol/L    Potassium 4.9 3.5 - 5.1 mmol/L    Chloride 108 98 - 112 mmol/L    CO2 25.0 21.0 - 32.0 mmol/L    Anion Gap 8 0 - 18 mmol/L    BUN 24 (H) 9 - 23 mg/dL    Creatinine 1.53 (H) 0.55 - 1.02 mg/dL    BUN/CREA Ratio 15.7 10.0 - 20.0    Calcium, Total 9.4 8.7 - 10.4 mg/dL    Calculated Osmolality 297 (H) 275 - 295 mOsm/kg    eGFR-Cr 34 (L) >=60 mL/min/1.73m2    Patient Fasting for BMP? Yes      *Note: Due to a large number of results and/or encounters for the requested time period, some results have not been displayed. A complete set of  results can be found in Results Review.       Recent Results (from the past 8760 hour(s))   CBC W/ DIFFERENTIAL    Collection Time: 01/11/24 12:03 PM   Result Value Ref Range    WBC 8.8 4.0 - 11.0 x10(3) uL    RBC 4.50 3.80 - 5.30 x10(6)uL    HGB 13.3 12.0 - 16.0 g/dL    HCT 41.2 35.0 - 48.0 %    MCV 91.6 80.0 - 100.0 fL    MCH 29.6 26.0 - 34.0 pg    MCHC 32.3 31.0 - 37.0 g/dL    RDW-SD 45.4 35.1 - 46.3 fL    RDW 13.4 11.0 - 15.0 %    .0 150.0 - 450.0 10(3)uL    Neutrophil Absolute Prelim 6.81 1.50 - 7.70 x10 (3) uL    Neutrophil Absolute 6.81 1.50 - 7.70 x10(3) uL    Lymphocyte Absolute 1.02 1.00 - 4.00 x10(3) uL    Monocyte Absolute 0.57 0.10 - 1.00 x10(3) uL    Eosinophil Absolute 0.30 0.00 - 0.70 x10(3) uL    Basophil Absolute 0.04 0.00 - 0.20 x10(3) uL    Immature Granulocyte Absolute 0.02 0.00 - 1.00 x10(3) uL    Neutrophil % 77.8 %    Lymphocyte % 11.6 %    Monocyte % 6.5 %    Eosinophil % 3.4 %    Basophil % 0.5 %    Immature Granulocyte % 0.2 %     *Note: Due to a large number of results and/or encounters for the requested time period, some results have not been displayed. A complete set of results can be found in Results Review.       Imaging:  MRI lumbar spine 2/15/2023  Impression   CONCLUSION:       1. Multilevel degenerative changes of the lumbar spine as detailed. Notable levels as follows:       2. L3-L4:  There is an 11 x 9 mm right subarticular zone disc extrusion, which results in moderate to severe right lateral recess stenosis and effacement of the traversing right L4 nerve root; please correlate for right L4 radiculopathy.  Additional mild    spinal canal and mild right neural foraminal stenosis.   3. L5-S1:  Mild left and minor right neural foraminal stenosis.       4. Right kidney is not identified and likely surgically absent.       5. Lesser incidental findings as above.          MRI pelvis 2/25/2023  FINDINGS:   Diffuse fatty infiltration of the pelvic musculature, symmetric.        No marrow replacing lesion is seen.  There is no fracture.       Partial imaging of degenerative arthritis of the lower lumbar spine.  Sacral Tarlov cysts are noted.       Mild osteoarthritis of both hips and both sacroiliac joints.  Mild degenerative changes of the pubic symphysis.  Bilateral gluteus minimus/medius attachments on the greater trochanter are normal.  Bilateral hamstring attachments are intact.       Fluid-filled/prominent endometrial cavity is noted and partially imaged.                   Impression   CONCLUSION:       1. Degenerative changes of the pelvis.       2. Fluid-filled/prominent endometrial cavity.  This will require further workup with pelvic ultrasound..       Ultrasound pelvis 3/2/2023  Impression   CONCLUSION:  Thickened endometrium with complex heterogeneous appearance with cystic components.  Given patient's postmenopausal state, this is abnormal.  Consider endometrial biopsy to further evaluate.      Holter monitor 10/11/2023  - Frequent PACs 2%  - 62 atrial runs and maximal rate of 138 bpm  - Frequent PVCs 2.5%  - No sustained ventricular arrhythmias    Pathology:  Endometrial biopsy 3/9/2023  Final Diagnosis:      Endometrium; biopsy:   Mostly mucus with scanty fragments of endometrial stroma and benign endocervical glands present.  No evidence of hyperplasia or malignancy identified.  See comment.           ASSESSMENT AND PLAN:       Paroxysmal atrial fibrillation  Transient A-fib while in PACU postop.  She was otherwise hemodynamically stable  - Zio patch reviewed as above  - Seen by cardiology, Dr. Hyde 10/19/2023-continued on metoprolol 50 mg twice a day     Right lumbar radiculopathy  MRI lumbar --2/15/23-L3-L4 right disc extrusion, results in mod to severe right lateral recess stenosis and effacement of the traversing right L4 nerve root.  I suspect pain is related to L4 radiculopathy.  Patient has seen Dr. Power of physiatry.  She wishes another opinion.  Referred to  pain management clinic.  Seen 4/11/2023, plans for SI joint injection on 4/18/2023.  If no improvement, may benefit from epidural injection.    -Status post right SI joint injection 4/18/2023 with minimal relief.  Most recently seen by Dr. Nava 7/21/2023.  Refractory to injections, for which she was recommended to consider operative management-spinal cord stimulator trial with Dr. Osiel Green  - Status post L3-L4 interbody fusion with posterior spinal fusion with Dr. Green 9/21/2023.  -Follow-up with Dr. Green neurosurgery  - She is very pleased with the results of her surgery thus far.      Thickened endometrium  -EMB  done 3/9/2023 by Dr. Mejia.  -Biopsy results show no evidence of hyperplasia or malignancy     Ischemic cardiomyopathy  Stented coronary artery  Hosp 5/2022 for CHF.  Cardiac cath 5/27/22-Dr. Swenson-stents placed to LAD and diagonal.  An echo 2/28/2023-Dr. Jones-EF 36%, up from 30 to 35% in May 2022.  - Follow-up with Dr. Hyde in 10/19/2023 -EF recovered to 40%.  Continued on Entresto twice a day, metoprolol succinate 50 mg twice a day, Jardiance 10 mg daily, isosorbide mononitrate 30 mg daily, hydralazine 50 mg 3 times a day.  Not able to tolerate spironolactone due to hyperkalemia.  Lasix on an as-needed basis.     CKD (chronic kidney disease), stage IV (HCC)  has solitary kidney.  Sees Dr Salomon.   - Creatinine 1.65, BUN 24 on day of discharge  - Last seen by Dr. Salomon 2/21 continue to monitor stage IV CKD  - Last bMP Cr 1.71, eGFr 30     Diabetes type 2  levimir 28 u/d (Dr Jones d/c Jardiance 10 mg in Aug 2022-since restarted) , NovoLog via SS.   Metformin d/c 2020 RI after R nephrectomy.  Glimepiride stopped 6/2022 low sugars.   ophtho Dr Dodd.   Recent A1c:   HEMOGLOBIN A1C (%)   Date Value   03/22/2021 6.2 (A)     HgbA1C (%)   Date Value   01/11/2024 7.9 (H)     Recent urine microalbumin: No components found for: \"URINEMICROALBUMIN\"  Current medications: Levemir 28 units/day, NovoLog  sliding scale, Jardiance 10 mg  Eye exam: Due for eye examination - sees Dr. Dodd  Foot exam: Seems to be well-controlled  - A1c today Slight increase -lets continue trying to cut down on carbohydrates, continue with good exercise regimen.     Hypertension   -metoprolol ER 50 mg bid, hydralazine 50 mg tid, Imdur 30 mg daily.     amlodipine 5 mg daily. (also on entresto).  Off Enalapril  for incr K.  Off hctz 25 mg  due to renal insuff.  - Continue checking blood pressures at home     Hyperlipidemia, mxd  - rosuvastatin 20 mg daily in hosp 2022.    -  LDL 30 on last lipid panel  - Continue to optimize nutrition     Bereavement  -her   of lung cancer 4/3/22. She is doing ok w support system of family, etc. No depression. Declined med or counselor.     S/p R nephrectomy for renal cell carcinoma   -20 Dr Denise-12.0 cm -margins free.  MRI abd and CXR 21-Dr. Denise no recurrence or metastatic disease. Previously followed with Dr. Dutton   -May need to establish with a new oncologist     DJD right knee  - Xray R knee 21--severe DJD.  Dr Rose gave brianna shot 21.     S/p gout right big toe   2020 and 2021, prednisone rx.  uric acid 20 elev 6.8 (ref range 2.6-6.0).  Declined prev med.      Carotid bruits  -bilat. [employer] screening 10/3/17-carotids mild bilat--> mild bilat carotids on [employer] 18.     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 Leal - has appt next week.      Vitamin D deficiency  Vitamin D level 20.6, will repeat levels  - Start vitamin D3/cholecalciferol 2000 units once a day         Orders This Visit:  Orders Placed This Encounter   Procedures    CBC With Differential With Platelet    Comp Metabolic Panel (14)    Hemoglobin A1C    TSH W Reflex To Free T4    Urinalysis with Culture Reflex    Vitamin D    Microalb/Creat Ratio,  Random Urine    Lipid Panel    POC Glycohemoglobin [[id number]]       Meds This Visit:  Requested Prescriptions      No prescriptions requested or ordered in this encounter       Imaging & Referrals:  None     Health Maintenance  May be due for Colonoscopy  Due for COVID Dose #7    Spent 30 minutes obtaining history, evaluating patient, discussing treatment options, diet, exercise, review of available labs and radiology reports, and completing documentation.       Return to clinic in 3-4 months for Medicare Annual Physical exam    rAtie Guerra MD, 04/01/24, 11:44 AM

## 2024-03-28 NOTE — PATIENT INSTRUCTIONS
You were seen in clinic today for follow-up of diabetes. Today, your A1c was 8.2% slightly higher from the last check.  - Continue checking your blood sugars at home  - Continue with optimizing nutrition, exercise as able  - Continue checking your blood pressure at home    We are on multiple blood pressure medications to keep the top number/systolic number down.  As long as you are not having persistent symptoms of dizziness/lightheadedness/drowsiness, the diastolic number on the lower end is acceptable.  Monitor for any clinical changes here.    We are happy to hear you have progressed well since your back surgery  - Continue with home stretches and exercises as tolerated  - Follow-up with Dr. Hyde of Cardiology      Follow-up with Dr. Salomon for surveillance of your kidney function    Return to clinic in 3-4 months for medicare annual physical exam, we did place fasting blood work to be completed prior to the next visit

## 2024-04-01 ENCOUNTER — OFFICE VISIT (OUTPATIENT)
Dept: INTERNAL MEDICINE CLINIC | Facility: CLINIC | Age: 79
End: 2024-04-01

## 2024-04-01 VITALS
WEIGHT: 163 LBS | HEART RATE: 57 BPM | DIASTOLIC BLOOD PRESSURE: 44 MMHG | SYSTOLIC BLOOD PRESSURE: 142 MMHG | HEIGHT: 62.5 IN | OXYGEN SATURATION: 98 % | TEMPERATURE: 98 F | BODY MASS INDEX: 29.25 KG/M2

## 2024-04-01 DIAGNOSIS — E11.9 TYPE 2 DIABETES MELLITUS WITHOUT COMPLICATION, WITHOUT LONG-TERM CURRENT USE OF INSULIN (HCC): Primary | ICD-10-CM

## 2024-04-01 DIAGNOSIS — I10 ESSENTIAL HYPERTENSION: ICD-10-CM

## 2024-04-01 DIAGNOSIS — I25.5 ISCHEMIC CARDIOMYOPATHY: ICD-10-CM

## 2024-04-01 DIAGNOSIS — E78.2 HYPERLIPIDEMIA, MIXED: ICD-10-CM

## 2024-04-01 DIAGNOSIS — Z90.5 S/P NEPHRECTOMY: ICD-10-CM

## 2024-04-01 DIAGNOSIS — Z13.89 SCREENING FOR NEPHROPATHY: ICD-10-CM

## 2024-04-01 DIAGNOSIS — N18.4 CKD (CHRONIC KIDNEY DISEASE), STAGE IV (HCC): ICD-10-CM

## 2024-04-01 DIAGNOSIS — Z13.0 SCREENING FOR DEFICIENCY ANEMIA: ICD-10-CM

## 2024-04-01 DIAGNOSIS — Z13.29 SCREENING FOR THYROID DISORDER: ICD-10-CM

## 2024-04-01 DIAGNOSIS — E55.9 VITAMIN D DEFICIENCY: ICD-10-CM

## 2024-04-01 LAB
CARTRIDGE LOT#: ABNORMAL NUMERIC
HEMOGLOBIN A1C: 8.2 % (ref 4.3–5.6)

## 2024-04-01 PROCEDURE — 99214 OFFICE O/P EST MOD 30 MIN: CPT | Performed by: INTERNAL MEDICINE

## 2024-04-01 PROCEDURE — 83036 HEMOGLOBIN GLYCOSYLATED A1C: CPT | Performed by: INTERNAL MEDICINE

## 2024-04-09 ENCOUNTER — HOSPITAL ENCOUNTER (OUTPATIENT)
Age: 79
Discharge: HOME | End: 2024-04-09
Payer: MEDICARE

## 2024-04-09 ENCOUNTER — TELEPHONE (OUTPATIENT)
Dept: INTERNAL MEDICINE CLINIC | Facility: CLINIC | Age: 79
End: 2024-04-09

## 2024-04-09 ENCOUNTER — APPOINTMENT (OUTPATIENT)
Dept: GENERAL RADIOLOGY | Age: 79
End: 2024-04-09
Attending: PHYSICIAN ASSISTANT
Payer: MEDICARE

## 2024-04-09 VITALS
OXYGEN SATURATION: 97 % | RESPIRATION RATE: 18 BRPM | TEMPERATURE: 97 F | SYSTOLIC BLOOD PRESSURE: 139 MMHG | DIASTOLIC BLOOD PRESSURE: 43 MMHG | HEART RATE: 62 BPM

## 2024-04-09 DIAGNOSIS — M25.571 ACUTE RIGHT ANKLE PAIN: Primary | ICD-10-CM

## 2024-04-09 PROCEDURE — 73610 X-RAY EXAM OF ANKLE: CPT | Performed by: PHYSICIAN ASSISTANT

## 2024-04-09 PROCEDURE — 99213 OFFICE O/P EST LOW 20 MIN: CPT | Performed by: PHYSICIAN ASSISTANT

## 2024-04-09 RX ORDER — PREDNISONE 20 MG/1
40 TABLET ORAL DAILY
Qty: 10 TABLET | Refills: 0 | Status: SHIPPED | OUTPATIENT
Start: 2024-04-09 | End: 2024-04-14

## 2024-04-09 NOTE — TELEPHONE ENCOUNTER
Patient called c/o severe pain right foot, she can't put any pressure on it, painful to the touch, no fall, no tripping, please advise

## 2024-04-09 NOTE — TELEPHONE ENCOUNTER
Please advise - called patient  who started with right  ankle pain yesterday- ice and heat helped a little . Today she can.t put weight on it , painful all the time - denies swelling , denies redness - to

## 2024-04-09 NOTE — ED PROVIDER NOTES
Patient Seen in: [facility]      History     Chief Complaint   Patient presents with    Leg or Foot Injury     Stated Complaint: Right Foot Pain    Subjective:   HPI    78-year-old female with history of DM 2, CKD 3, renal cell carcinoma status post nephrectomy, gout who is now presenting for evaluation of right ankle pain onset yesterday.  Patient denies fall, injury or trauma.  Woke up with the pain yesterday, today feels as though she cannot walk on the ankle.  She denies swelling, redness.  Patient has not had fevers.  Patient states she has never had gout in the ankle before but symptoms are somewhat similar to previous gout flares.    Objective:   No pertinent past medical history.            No pertinent past surgical history.              No pertinent social history.            Review of Systems    Positive for stated complaint: Right Foot Pain  Other systems are as noted in HPI.  Constitutional and vital signs reviewed.      All other systems reviewed and negative except as noted above.    Physical Exam     ED Triage Vitals [04/09/24 1255]   /43   Pulse 62   Resp 18   Temp 97.2 °F (36.2 °C)   Temp src Temporal   SpO2 97 %   O2 Device None (Room air)       Current:/43   Pulse 62   Temp 97.2 °F (36.2 °C) (Temporal)   Resp 18   LMP  (LMP Unknown)   SpO2 97%         Physical Exam  Vitals and nursing note reviewed.   Constitutional:       General: She is not in acute distress.  HENT:      Head: Normocephalic and atraumatic.      Right Ear: External ear normal.      Left Ear: External ear normal.      Nose: Nose normal.      Mouth/Throat:      Mouth: Mucous membranes are moist.   Eyes:      Extraocular Movements: Extraocular movements intact.      Pupils: Pupils are equal, round, and reactive to light.   Cardiovascular:      Rate and Rhythm: Normal rate.   Pulmonary:      Effort: Pulmonary effort is normal.   Abdominal:      General: Abdomen is flat.   Musculoskeletal:          General: Normal range of motion.      Cervical back: Normal range of motion.   Skin:     General: Skin is warm.   Neurological:      General: No focal deficit present.      Mental Status: She is alert and oriented to person, place, and time.   Psychiatric:         Mood and Affect: Mood normal.         Behavior: Behavior normal.               ED Course   Labs Reviewed - No data to display      79 yo female here with c/o R ankle pain onset yesterday. R ankle tender to touch, edema over the medial malleolus. Calf soft and compressible. 2+ dp pulse.  Ddx- acute gout, pseudogout,  sprain, fx,    Xray negative for fracture.   PE suspicious for gout. Will with prednisone x 5 days.  Patient aware to keep frequent check of glucose and call PCP if glucose greater than 250    PCP follow-up and return precautions reviewed           MDM                                         Medical Decision Making      Disposition and Plan     Clinical Impression:  1. Acute right ankle pain         Disposition:  Discharge  4/9/2024  2:09 pm    Follow-up:  Artie Guerra MD  [address] [place] [id number]  [phone]                Medications Prescribed:  Discharge Medication List as of 4/9/2024  2:11 PM        START taking these medications    Details   predniSONE 20 MG Oral Tab Take 2 tablets (40 mg total) by mouth daily for 5 days., Normal, Disp-10 tablet, R-0

## 2024-04-09 NOTE — TELEPHONE ENCOUNTER
No availability today nor tomorrow unfortunately. Can go to urgent care evaluation as they are able to perform an x-ray given the inability to bear weight and constant pain.  Need to rule out fracture, possible gout flareup and approrpate management

## 2024-04-26 RX ORDER — BLOOD-GLUCOSE METER
1 KIT MISCELLANEOUS 3 TIMES DAILY
Qty: 300 STRIP | Refills: 3 | Status: SHIPPED | OUTPATIENT
Start: 2024-04-26

## 2024-04-26 NOTE — TELEPHONE ENCOUNTER
Refill request is for a maintenance medication and has met the criteria specified in the Ambulatory Medication Refill Standing Order for eligibility, visits, laboratory, alerts and was sent to the requested pharmacy.    Requested Prescriptions     Signed Prescriptions Disp Refills    FREESTYLE LITE TEST In Vitro Strip 300 strip 3     Sig: TEST 3 TIMES A DAY     Authorizing Provider: MARY ARREOLA     Ordering User: CRISTHIAN HERNANDEZ

## 2024-05-22 RX ORDER — ISOSORBIDE MONONITRATE 30 MG/1
30 TABLET, EXTENDED RELEASE ORAL DAILY
Qty: 90 TABLET | Refills: 3 | Status: SHIPPED | OUTPATIENT
Start: 2024-05-22

## 2024-05-22 RX ORDER — ISOSORBIDE MONONITRATE 30 MG/1
30 TABLET, EXTENDED RELEASE ORAL DAILY
Qty: 90 TABLET | Refills: 3 | OUTPATIENT
Start: 2024-05-22

## 2024-06-03 ENCOUNTER — HOSPITAL ENCOUNTER (OUTPATIENT)
Dept: CT IMAGING | Facility: HOSPITAL | Age: 79
Discharge: HOME | End: 2024-06-03
Attending: INTERNAL MEDICINE
Payer: MEDICARE

## 2024-06-03 DIAGNOSIS — C64.1 RENAL CELL CARCINOMA OF RIGHT KIDNEY (HCC): ICD-10-CM

## 2024-06-03 DIAGNOSIS — Z90.5 S/P NEPHRECTOMY: ICD-10-CM

## 2024-06-03 PROCEDURE — 74176 CT ABD & PELVIS W/O CONTRAST: CPT | Performed by: INTERNAL MEDICINE

## 2024-06-03 PROCEDURE — 71250 CT THORAX DX C-: CPT | Performed by: INTERNAL MEDICINE

## 2024-06-10 ENCOUNTER — APPOINTMENT (OUTPATIENT)
Dept: HEMATOLOGY/ONCOLOGY | Facility: HOSPITAL | Age: 79
End: 2024-06-10
Attending: INTERNAL MEDICINE
Payer: MEDICARE

## 2024-06-10 ENCOUNTER — OFFICE VISIT (OUTPATIENT)
Dept: HEMATOLOGY/ONCOLOGY | Facility: HOSPITAL | Age: 79
End: 2024-06-10
Attending: STUDENT IN AN ORGANIZED HEALTH CARE EDUCATION/TRAINING PROGRAM
Payer: MEDICARE

## 2024-06-10 VITALS
RESPIRATION RATE: 18 BRPM | SYSTOLIC BLOOD PRESSURE: 145 MMHG | DIASTOLIC BLOOD PRESSURE: 58 MMHG | HEART RATE: 58 BPM | HEIGHT: 62.52 IN | TEMPERATURE: 98 F | BODY MASS INDEX: 29.22 KG/M2 | OXYGEN SATURATION: 98 % | WEIGHT: 162.88 LBS

## 2024-06-10 DIAGNOSIS — C64.1 RENAL CELL CARCINOMA OF RIGHT KIDNEY (HCC): Primary | ICD-10-CM

## 2024-06-10 DIAGNOSIS — Z90.5 S/P NEPHRECTOMY: ICD-10-CM

## 2024-06-10 PROCEDURE — 99213 OFFICE O/P EST LOW 20 MIN: CPT | Performed by: STUDENT IN AN ORGANIZED HEALTH CARE EDUCATION/TRAINING PROGRAM

## 2024-06-10 NOTE — PROGRESS NOTES
Oncology Progress Note    Chief Complaint:  Hx of RCC s/p right radical nephrectomy.     Oncology History:  78 year old referred by Dr. Salomon RCC, clear type, grade 1 pT2b post nephrectomy 2020. Clinically she feels well. No concerns or complaints.     Past Medical History:  Past Medical History:    Adenomatous colon polyp    colonoscopy 3/13 Dr. Martínez-also diverticulosis and int hem.    Allergic conjunctivitis    Dr. Dodd    Back problem    CAD (coronary artery disease)    Cardiac cath 22-Dr. Swenson-stents placed to LAD and diagonal.     Cancer (HCC)    Congestive heart disease (HCC)    Diabetes (HCC)    Diabetes mellitus (HCC)    Diverticulosis    Essential hypertension    Glaucoma    Gout    Hyperlipidemia    Hypertension, essential    Internal hemorrhoids    Ischemic cardiomyopathy    LBBB (left bundle branch block)    nl stress echo -Dr. Jones    Osteopenia    f/u dexa nl in     Renal cell carcinoma (HCC)     robotic asissted R radical nephrectomy 20 Dr Denise.    Type 2 diabetes mellitus (HCC)       Past Surgical History:  Past Surgical History:   Procedure Laterality Date    Back surgery  [id number]    Cataract      Cataract extraction Bilateral     Dr. Dodd    Cholecystectomy  1981    at [facility]    Colonoscopy N/A 2018    Procedure: COLONOSCOPY, POSSIBLE BIOPSY, POSSIBLE POLYPECTOMY 17628;  Surgeon: Jersey Martínez MD;  [place] [facility] [place] localization wire 1 site left (cpt=19281)      Nephrectomy Right 2020     robotic asissted R radical nephrectomy 20 Dr Denise.      1968       Family History:  Family History   Problem Relation Age of Onset    Stroke Father          age 80    Other (unknown cause of death) Mother          age 83    Diabetes Mother     Hypertension Brother     Other (1 brother) Other         living and well    Other (1 son, 1 daughter) Other     Breast Cancer Neg        Social  History:  Unchanged from consult note    Current Medications:    Current Outpatient Medications:     isosorbide mononitrate ER 30 MG Oral Tablet 24 Hr, Take 1 tablet (30 mg total) by mouth daily., Disp: 90 tablet, Rfl: 3    FREESTYLE LITE TEST In Vitro Strip, TEST 3 TIMES A DAY, Disp: 300 strip, Rfl: 3    empagliflozin (JARDIANCE) 10 MG Oral Tab, Take 1 tablet (10 mg total) by mouth daily., Disp: 90 tablet, Rfl: 3    insulin glargine 100 UNIT/ML Subcutaneous Solution Pen-injector, Inject 28 Units into the skin every morning., Disp: 25.2 mL, Rfl: 3    amLODIPine 5 MG Oral Tab, Take 1 tablet (5 mg total) by mouth daily., Disp: 90 tablet, Rfl: 3    acetaminophen 325 MG Oral Tab, Take 1 tablet (325 mg total) by mouth every 6 (six) hours as needed for Pain., Disp: , Rfl:     hydrALAZINE 50 MG Oral Tab, Take 1 tablet (50 mg total) by mouth 3 (three) times daily., Disp: 270 tablet, Rfl: 3    furosemide 20 MG Oral Tab, Take 1 tablet (20 mg total) by mouth daily. 5/4 restarted, Disp: 90 tablet, Rfl: 3    rosuvastatin 20 MG Oral Tab, Take 1 tablet (20 mg total) by mouth nightly., Disp: 90 tablet, Rfl: 0    sacubitril-valsartan  MG Oral Tab, Take 1 tablet by mouth 2 (two) times daily., Disp: , Rfl:     Insulin Pen Needle (BD PEN NEEDLE MINI U/F) 31G X 5 MM Does not apply Misc, Use three times daily with insulin., Disp: 300 each, Rfl: 3    metoprolol succinate ER 50 MG Oral Tablet 24 Hr, Take 1 tablet (50 mg total) by mouth 2 (two) times a day., Disp: 180 tablet, Rfl: 3    RHOPRESSA 0.02 % Ophthalmic Solution, 1 drop every morning., Disp: , Rfl:     aspirin 81 MG Oral Tab EC, Take 1 tablet (81 mg total) by mouth daily., Disp: , Rfl: 0    LUMIGAN 0.01 % Ophthalmic Solution, Place 1 drop into both eyes daily., Disp: , Rfl:     Allergies:  Allergies   Allergen Reactions    Bactrim [Sulfamethoxazole W/Trimethoprim] NAUSEA AND VOMITING     Nausea and vomiting in 7/2018    Atorvastatin      Other reaction(s): ears ring      Review of Systems:  All other systems reviewed and negative x10 except as listed above    Vital Signs:  /58 (BP Location: Right arm, Patient Position: Sitting, Cuff Size: large)   Pulse 58   Temp 97.9 °F (36.6 °C) (Oral)   Resp 18   Ht 1.588 m (5' 2.52\")   Wt 73.9 kg (162 lb 14.4 oz)   LMP  (LMP Unknown)   SpO2 98%   BMI 29.30 kg/m²     Physical Examination:  Performance Status: 0  General: Patient is alert and oriented x 3, not in acute distress.  HEENT: EOMs intact. Oropharynx is clear.   Neck: No palpable lymphadenopathy. Neck is supple.  Chest: Symmetric expansion, nonlabored breathing  Abdomen: Soft, non tender. ND  Extremities: No edema, cyanosis, or bruising  Neurological: motor strength grossly intact, MA4E  Psych: appropriate mood and affect      Laboratory assessed and reviewed:  Lab Results   Component Value Date     (H) 02/15/2024    BUN 23 02/15/2024    BUNCREA 13.5 02/15/2024    CREATSERUM 1.71 (H) 02/15/2024    ANIONGAP 8 02/15/2024    GFRNAA 30 (L) 07/28/2022    GFRAA 35 (L) 07/28/2022    CA 9.0 02/15/2024    OSMOCALC 300 (H) 02/15/2024    ALKPHO 75 09/12/2023    AST 12 (L) 09/12/2023    ALT 18 09/12/2023    ALKPHOS 62 07/08/2016    BILT 0.6 09/12/2023    TP 6.4 09/12/2023    ALB 4.2 02/15/2024    GLOBULIN 2.8 09/12/2023    AGRATIO 1.7 07/08/2016     02/15/2024    K 4.7 02/15/2024     02/15/2024    CO2 24.0 02/15/2024     Impression and Plan:  Clear cell RCC, grade 1, pT2nx stage II s/p right nephrectomy 9/2/2020 (Dr Denise)  Doing well, continue active surveillance per [facility]    Lissette GAVI 3-year 4.  We will see her back in 1 year for surveillance CT.  We will attempt contrast but given her renal function I am not to miss that and if creatinine clearance less than 30 we can proceed with a noncontrast CT.  She knows to call with any questions or concerns that may arise in the interim.    KARLI Mireles, DO

## 2024-06-25 RX ORDER — FUROSEMIDE 20 MG/1
20 TABLET ORAL DAILY
Qty: 90 TABLET | Refills: 3 | OUTPATIENT
Start: 2024-06-25

## 2024-07-15 RX ORDER — HYDRALAZINE HYDROCHLORIDE 50 MG/1
50 TABLET, FILM COATED ORAL 3 TIMES DAILY
Qty: 270 TABLET | Refills: 3 | Status: SHIPPED | OUTPATIENT
Start: 2024-07-15

## 2024-08-14 ENCOUNTER — LAB ENCOUNTER (OUTPATIENT)
Dept: LAB | Age: 79
End: 2024-08-14
Attending: INTERNAL MEDICINE
Payer: MEDICARE

## 2024-08-14 ENCOUNTER — TELEPHONE (OUTPATIENT)
Facility: CLINIC | Age: 79
End: 2024-08-14

## 2024-08-14 DIAGNOSIS — Z13.0 SCREENING FOR DEFICIENCY ANEMIA: ICD-10-CM

## 2024-08-14 DIAGNOSIS — Z13.29 SCREENING FOR THYROID DISORDER: ICD-10-CM

## 2024-08-14 DIAGNOSIS — E55.9 VITAMIN D DEFICIENCY: ICD-10-CM

## 2024-08-14 DIAGNOSIS — Z13.89 SCREENING FOR NEPHROPATHY: ICD-10-CM

## 2024-08-14 DIAGNOSIS — E11.9 TYPE 2 DIABETES MELLITUS WITHOUT COMPLICATION, WITHOUT LONG-TERM CURRENT USE OF INSULIN (HCC): ICD-10-CM

## 2024-08-14 DIAGNOSIS — N18.4 CKD (CHRONIC KIDNEY DISEASE), STAGE IV (HCC): ICD-10-CM

## 2024-08-14 DIAGNOSIS — E78.2 HYPERLIPIDEMIA, MIXED: ICD-10-CM

## 2024-08-14 LAB
ALBUMIN SERPL-MCNC: 4.2 G/DL (ref 3.2–4.8)
ALBUMIN/GLOB SERPL: 1.9 {RATIO} (ref 1–2)
ALP LIVER SERPL-CCNC: 81 U/L
ALT SERPL-CCNC: 11 U/L
ANION GAP SERPL CALC-SCNC: 8 MMOL/L (ref 0–18)
AST SERPL-CCNC: 15 U/L (ref ?–34)
BASOPHILS # BLD AUTO: 0.04 X10(3) UL (ref 0–0.2)
BASOPHILS NFR BLD AUTO: 0.5 %
BILIRUB SERPL-MCNC: 0.6 MG/DL (ref 0.2–1.1)
BILIRUB UR QL: NEGATIVE
BUN BLD-MCNC: 31 MG/DL (ref 9–23)
BUN/CREAT SERPL: 16.8 (ref 10–20)
CALCIUM BLD-MCNC: 8.9 MG/DL (ref 8.7–10.4)
CHLORIDE SERPL-SCNC: 111 MMOL/L (ref 98–112)
CHOLEST SERPL-MCNC: 109 MG/DL (ref ?–200)
CLARITY UR: CLEAR
CO2 SERPL-SCNC: 22 MMOL/L (ref 21–32)
CREAT BLD-MCNC: 1.84 MG/DL
CREAT UR-SCNC: 51.1 MG/DL
DEPRECATED RDW RBC AUTO: 43.5 FL (ref 35.1–46.3)
EGFRCR SERPLBLD CKD-EPI 2021: 27 ML/MIN/1.73M2 (ref 60–?)
EOSINOPHIL # BLD AUTO: 0.27 X10(3) UL (ref 0–0.7)
EOSINOPHIL NFR BLD AUTO: 3.4 %
ERYTHROCYTE [DISTWIDTH] IN BLOOD BY AUTOMATED COUNT: 13.2 % (ref 11–15)
EST. AVERAGE GLUCOSE BLD GHB EST-MCNC: 160 MG/DL (ref 68–126)
FASTING PATIENT LIPID ANSWER: YES
FASTING STATUS PATIENT QL REPORTED: YES
GLOBULIN PLAS-MCNC: 2.2 G/DL (ref 2–3.5)
GLUCOSE BLD-MCNC: 184 MG/DL (ref 70–99)
GLUCOSE UR-MCNC: >1000 MG/DL
HBA1C MFR BLD: 7.2 % (ref ?–5.7)
HCT VFR BLD AUTO: 39.2 %
HDLC SERPL-MCNC: 43 MG/DL (ref 40–59)
HGB BLD-MCNC: 13 G/DL
HGB UR QL STRIP.AUTO: NEGATIVE
IMM GRANULOCYTES # BLD AUTO: 0.02 X10(3) UL (ref 0–1)
IMM GRANULOCYTES NFR BLD: 0.3 %
KETONES UR-MCNC: NEGATIVE MG/DL
LDLC SERPL CALC-MCNC: 37 MG/DL (ref ?–100)
LEUKOCYTE ESTERASE UR QL STRIP.AUTO: 25
LYMPHOCYTES # BLD AUTO: 0.68 X10(3) UL (ref 1–4)
LYMPHOCYTES NFR BLD AUTO: 8.7 %
MCH RBC QN AUTO: 30 PG (ref 26–34)
MCHC RBC AUTO-ENTMCNC: 33.2 G/DL (ref 31–37)
MCV RBC AUTO: 90.3 FL
MICROALBUMIN UR-MCNC: 11 MG/DL
MICROALBUMIN/CREAT 24H UR-RTO: 215.3 UG/MG (ref ?–30)
MONOCYTES # BLD AUTO: 0.56 X10(3) UL (ref 0.1–1)
MONOCYTES NFR BLD AUTO: 7.2 %
NEUTROPHILS # BLD AUTO: 6.26 X10 (3) UL (ref 1.5–7.7)
NEUTROPHILS # BLD AUTO: 6.26 X10(3) UL (ref 1.5–7.7)
NEUTROPHILS NFR BLD AUTO: 79.9 %
NITRITE UR QL STRIP.AUTO: NEGATIVE
NONHDLC SERPL-MCNC: 66 MG/DL (ref ?–130)
OSMOLALITY SERPL CALC.SUM OF ELEC: 303 MOSM/KG (ref 275–295)
PH UR: 5 [PH] (ref 5–8)
PLATELET # BLD AUTO: 198 10(3)UL (ref 150–450)
POTASSIUM SERPL-SCNC: 4.9 MMOL/L (ref 3.5–5.1)
PROT SERPL-MCNC: 6.4 G/DL (ref 5.7–8.2)
PROT UR-MCNC: 20 MG/DL
RBC # BLD AUTO: 4.34 X10(6)UL
SODIUM SERPL-SCNC: 141 MMOL/L (ref 136–145)
SP GR UR STRIP: 1.01 (ref 1–1.03)
TRIGL SERPL-MCNC: 181 MG/DL (ref 30–149)
TSI SER-ACNC: 2.96 MIU/ML (ref 0.55–4.78)
UROBILINOGEN UR STRIP-ACNC: NORMAL
VIT D+METAB SERPL-MCNC: 32 NG/ML (ref 30–100)
VLDLC SERPL CALC-MCNC: 25 MG/DL (ref 0–30)
WBC # BLD AUTO: 7.8 X10(3) UL (ref 4–11)
YEAST UR QL: PRESENT /HPF

## 2024-08-14 PROCEDURE — 87086 URINE CULTURE/COLONY COUNT: CPT

## 2024-08-14 PROCEDURE — 82043 UR ALBUMIN QUANTITATIVE: CPT

## 2024-08-14 PROCEDURE — 80061 LIPID PANEL: CPT

## 2024-08-14 PROCEDURE — 82306 VITAMIN D 25 HYDROXY: CPT

## 2024-08-14 PROCEDURE — 87186 SC STD MICRODIL/AGAR DIL: CPT

## 2024-08-14 PROCEDURE — 82570 ASSAY OF URINE CREATININE: CPT

## 2024-08-14 PROCEDURE — 80053 COMPREHEN METABOLIC PANEL: CPT

## 2024-08-14 PROCEDURE — 85025 COMPLETE CBC W/AUTO DIFF WBC: CPT

## 2024-08-14 PROCEDURE — 81001 URINALYSIS AUTO W/SCOPE: CPT

## 2024-08-14 PROCEDURE — 83036 HEMOGLOBIN GLYCOSYLATED A1C: CPT

## 2024-08-14 PROCEDURE — 84443 ASSAY THYROID STIM HORMONE: CPT

## 2024-08-14 PROCEDURE — 36415 COLL VENOUS BLD VENIPUNCTURE: CPT

## 2024-08-14 PROCEDURE — 87088 URINE BACTERIA CULTURE: CPT

## 2024-08-15 ENCOUNTER — TELEPHONE (OUTPATIENT)
Dept: INTERNAL MEDICINE CLINIC | Facility: CLINIC | Age: 79
End: 2024-08-15

## 2024-08-15 RX ORDER — CEPHALEXIN 500 MG/1
500 CAPSULE ORAL 3 TIMES DAILY
Qty: 15 CAPSULE | Refills: 0 | Status: SHIPPED | OUTPATIENT
Start: 2024-08-15 | End: 2024-08-20

## 2024-08-15 NOTE — TELEPHONE ENCOUNTER
Patient contacted and relayed 's message below regarding UA results. Patient verbalized understanding, will pick-up and start the cephalexin.

## 2024-08-15 NOTE — TELEPHONE ENCOUNTER
Please notify the patient that the urine test came back positive for urinary tract infection.  Would recommend antibiotics while we wait for the rest of the urine culture to return.  Would prescribe cephalexin 500 mg 3 times a day for 5 days.  Will notify her of the results once the urine culture finalized

## 2024-08-16 ENCOUNTER — TELEPHONE (OUTPATIENT)
Dept: NEPHROLOGY | Facility: CLINIC | Age: 79
End: 2024-08-16

## 2024-08-16 RX ORDER — CIPROFLOXACIN 250 MG/1
250 TABLET, FILM COATED ORAL 2 TIMES DAILY
Qty: 6 TABLET | Refills: 0 | Status: SHIPPED | OUTPATIENT
Start: 2024-08-16 | End: 2024-08-19

## 2024-08-16 NOTE — TELEPHONE ENCOUNTER
Please notify the patient fortunately the initial antibiotic we sent the bacteria is resistant to.  We should escalate treatment to ciprofloxacin 1 tablet twice a day for 3 days  with the finalized urine culture results.

## 2024-08-16 NOTE — TELEPHONE ENCOUNTER
I spoke to Paola and relayed Dr Guerra's message to her. She verbalized understanding  Reinforced that she should stop the cephalexin and start cipro.

## 2024-08-16 NOTE — TELEPHONE ENCOUNTER
Patient is requesting to speak with an RN.  Patient states that she had a recent urine test and was told that she has a UTI.  Patient wants to make sure she is prescribed the correct antibiotic because she has 1 kidney and diabetes.  Please call

## 2024-08-16 NOTE — TELEPHONE ENCOUNTER
Correct, this regimen should be adequate for her kidney function.  Maximum be 500 mg within 24 hours and we spread out the dosage with the 250 every 12 hours.

## 2024-08-16 NOTE — TELEPHONE ENCOUNTER
Called patient and relayed  message - verbalized understanding - can she take  that since she only has one kidney    Susceptibility     Escherichia coli  ESBL Pos     Not Specified    Cefazolin >=64 Resistant    Cefepime  Resistant    Ceftazidime  Resistant    Ceftriaxone 16 Resistant    Ciprofloxacin <=0.25 Sensitive    Gentamicin <=1 Sensitive    Levofloxacin <=0.12 Sensitive    Meropenem <=0.25 Sensitive    Nitrofurantoin <=16 Sensitive    Piperacillin + Tazobactam 32 Intermediate    Trimethoprim/Sulfa <=20 Sensitive                   To

## 2024-08-20 NOTE — PATIENT INSTRUCTIONS
- You were seen in clinic for regular annual check-up.  I did review her most recent set of labs    Urine culture did confirm urinary tract infection.  We are on adequate antibiotics with ciprofloxacin    Cholesterol remains well-controlled and you have improved your sugar levels tremendously.  Keep up the good work with nutrition optimization and exercise as able    Kidney status remains stable at this time.  Will continue following with Dr. Salomon    For the recurrent nosebleeds, there may be some excessive dryness/possible microtrauma to the area especially with the sneezing episodes  - Lets proceed with monitoring for further episodes  - Pinch the nose in place pressure with use of Kleenex as needed  - Lets do 1 spray to the right nostril with Afrin nasal spray sent to your pharmacy.  You may use this once a day on an as-needed basis for any active bleeding  - Hold the aspirin tomorrow, resume at Thursday 8/29.    Notify us if the bloody noses continue, as we may need to hold the aspirin sometimes 2-3 days  - Please make an appointment with ENT with Dr. Alexander if there is still no improvement    -There is been no recurrence of renal cell carcinoma, follow-up with surveillance CT scans with Dr. Mireles  -Is reasonable to discontinue colonoscopies and mammograms moving forward  - Please make an appointment with ophthalmology for usual eye examinations  -Please continue checking your blood pressures at home and notify us if they are increasing   - please continue checking your blood sugars at home and notify us if they are increasing  - Continue following dermatology for usual skin checks  - Cardiac status remains stable, management to be continued with Dr. Hyde  -Vaccines you may be due for: COVID dose #7, We recommended checking with your insurance for coverage of Shingrix, a 2-dose shingles vaccine that can be obtained at the pharmacy if there is adequate coverage through your insurance plan.  - Please continue  to eat a varied diet including recommended servings of vegetables, fruits, and low fat dairy. Minimize high saturated fats (such as fast foods) and high sugar intake (such as soda)  - We recommend 150 minutes of moderate intensity exercise (brisk walking, swimming) weekly to maintain your current weight.  Targeted weight loss will require more vigorous exercise or more than 150 minutes/week.    Return to clinic in 6 months for diabetic follow-up.  We did place fasting blood work to be completed around that time

## 2024-08-20 NOTE — H&P
HPI:   Paola Kingston is a 78 year old female who presents for a Subsequent Annual Wellness visit (Pt already had Initial Annual Wellness).    A couple of months ago, 1x a week or every other week. Nothing seems to bring it on. Started with the heat, nothing seems to keep it dry. Has never had been an issue. Sneeze 15 times in the shower.      Sleep is OK. She is a nightowl. Goes to sleep late and wakes up early to let the dog out.    Knees are feeling better. Took some time to effect. Uses the cane. A little nap 1 hour at night time.    She had recent procedure of the eyes. Follow with Dr. Dodd, 1 at a time.     I reviewed and updated the PMHx, FamHx, medications, allergies, and SocHx as below with the patient     OB/GYN  Last menstrual period: Postmenopausal     SocHX  - Home: Feels safe at home  - Work: Feels safe at work; retired [employer] x 15 years  - Hobbies: spending the dog, family time. Cleaning the house, gardening. Cooking.  - Nutrition: everything - fruits, vegetables. No beef/lamb, chicken/fish/turkey/pork. Drinking water.  Son Barry states that she may have too many sweets accounting for the higher sugar level.  - Physical Activity: exercises - around the house, gardening.     No topic due editable text        Fall Risk Assessment:   She has been screened for Falls and is low risk.    Cognitive Assessment:   She had a completely normal cognitive assessment - see flowsheet entries     Functional Ability/Status:   Paola Kingston has some abnormal functions as listed below:  She has difficulties Shopping for Groceries based on screening of functional status. She has Walking problems based on screening of functional status.       Depression Screening (PHQ-2/PHQ-9): Over the LAST 2 WEEKS   Little interest or pleasure in doing things (over the last two weeks)?: Not at all  Feeling down, depressed, or hopeless (over the last two weeks)?: Not at all  PHQ-2 SCORE: 0      Advanced Directive: She  does have a Living Will but we do NOT have it on file in Epic.    She has a Power of  for Health Care on file in [facility].     Tobacco:  She smoked tobacco in the past but quit greater than 12 months ago.  Social History     Tobacco Use   Smoking Status Former    Current packs/day: 0.00    Average packs/day: 1 pack/day for 20.0 years (20.0 ttl pk-yrs)    Types: Cigarettes    Start date: 1973    Quit date: 1993    Years since quittin.0    Passive exposure: Never   Smokeless Tobacco Never      Ms. Kingston already takes aspirin and has it on her medication list.   CAGE Alcohol Screen:   CAGE screening score of 0 on 2024, showing low risk of alcohol abuse.      Patient Care Team: Patient Care Team:  Artie Guerra MD as PCP - General (Internal Medicine)  Jason Dodd MD (OPHTHALMOLOGY)  Milvia Mejia MD (OBSTETRICS & GYNECOLOGY)  Yani Perez DO (Anesthesiology)  Michel Hyde MD (Cardiovascular Diseases)    Patient Active Problem List   Diagnosis    Essential hypertension    Hyperlipidemia    Type 2 diabetes mellitus without complication, with long-term current use of insulin (HCC)    Elevated coronary artery calcium score    Stage 3 chronic kidney disease (HCC)    S/p nephrectomy, right    Renal cell carcinoma of right kidney (HCC)    Acute on chronic congestive heart failure (HCC)    Stented coronary artery    Ischemic cardiomyopathy    Acute on chronic combined systolic and diastolic HF (heart failure), NYHA class 3 (HCC)    Coronary artery disease involving native coronary artery of native heart without angina pectoris    Chronic combined systolic and diastolic CHF, NYHA class 2 (HCC)    Chronic midline low back pain without sciatica    Sacroiliac joint pain    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    Lumbar facet arthropathy    Intervertebral lumbar disc disorder    Lumbar disc herniation    Pre-op testing    Lumbar radiculopathy     Wt Readings from Last 3  Encounters:   08/27/24 161 lb 6.4 oz (73.2 kg)   08/26/24 161 lb (73 kg)   06/10/24 162 lb 14.4 oz (73.9 kg)      Last Cholesterol Labs:   Lab Results   Component Value Date    CHOLEST 109 08/14/2024    HDL 43 08/14/2024    LDL 37 08/14/2024    TRIG 181 (H) 08/14/2024          Last Chemistry Labs:   Lab Results   Component Value Date    AST 15 08/14/2024    ALT 11 08/14/2024    CA 8.9 08/14/2024    ALB 4.2 08/14/2024    TSH 2.956 08/14/2024    CREATSERUM 1.84 (H) 08/14/2024     (H) 08/14/2024        CBC  (most recent labs)   Lab Results   Component Value Date    WBC 7.8 08/14/2024    HGB 13.0 08/14/2024    .0 08/14/2024        ALLERGIES:   She is allergic to bactrim [sulfamethoxazole w/trimethoprim] and atorvastatin.    CURRENT MEDICATIONS:   Outpatient Medications Marked as Taking for the 8/27/24 encounter (Office Visit) with Artie Guerra MD   Medication Sig    oxymetazoline 0.05 % Nasal Solution 1 spray by Nasal route daily as needed for congestion (Nosebleed).    HYDRALAZINE 50 MG Oral Tab TAKE 1 TABLET 3 TIMES A DAY    isosorbide mononitrate ER 30 MG Oral Tablet 24 Hr Take 1 tablet (30 mg total) by mouth daily.    FREESTYLE LITE TEST In Vitro Strip TEST 3 TIMES A DAY    empagliflozin (JARDIANCE) 10 MG Oral Tab Take 1 tablet (10 mg total) by mouth daily.    insulin glargine 100 UNIT/ML Subcutaneous Solution Pen-injector Inject 28 Units into the skin every morning.    amLODIPine 5 MG Oral Tab Take 1 tablet (5 mg total) by mouth daily.    furosemide 20 MG Oral Tab Take 1 tablet (20 mg total) by mouth daily. 5/4 restarted    rosuvastatin 20 MG Oral Tab Take 1 tablet (20 mg total) by mouth nightly.    sacubitril-valsartan  MG Oral Tab Take 1 tablet by mouth 2 (two) times daily.    Insulin Pen Needle (BD PEN NEEDLE MINI U/F) 31G X 5 MM Does not apply Misc Use three times daily with insulin.    metoprolol succinate ER 50 MG Oral Tablet 24 Hr Take 1 tablet (50 mg total) by mouth 2 (two) times a  day.    aspirin 81 MG Oral Tab EC Take 1 tablet (81 mg total) by mouth daily.    LUMIGAN 0.01 % Ophthalmic Solution Place 1 drop into both eyes daily.      MEDICAL INFORMATION:   She  has a past medical history of Adenomatous colon polyp (), Allergic conjunctivitis (), Back problem, CAD (coronary artery disease) (2022), Cancer (HCC) (), Congestive heart disease (HCC), Diabetes (HCC) (), Diabetes mellitus (), Diverticulosis (), Essential hypertension (), Glaucoma, Gout, Hyperlipidemia, Hypertension, essential, Internal hemorrhoids ( and ), Ischemic cardiomyopathy (2022), LBBB (left bundle branch block) (), Osteopenia (), Renal cell carcinoma (HCC) (), and Type 2 diabetes mellitus ().    She  has a past surgical history that includes cholecystectomy (); Cataract extraction (Bilateral, ); colonoscopy (N/A, 2018); trudy localization wire 1 site left (cpt=19281) (); nephrectomy (Right, 2020); back surgery ([id number]); cataract (); and  ().    Her family history includes 1 brother in an other family member; 1 son, 1 daughter in an other family member; Diabetes in her mother; Hypertension in her brother; Stroke in her father; unknown cause of death in her mother.   SOCIAL HISTORY:   She  reports that she quit smoking about 31 years ago. Her smoking use included cigarettes. She started smoking about 51 years ago. She has a 20 pack-year smoking history. She has never been exposed to tobacco smoke. She has never used smokeless tobacco. She reports that she does not drink alcohol and does not use drugs.     REVIEW OF SYSTEMS:   GENERAL: feels well otherwise  SKIN: denies any unusual skin lesions  EYES: denies blurred vision or double vision  HEENT: denies nasal congestion, sinus pain or ST; +Epistaxis  LUNGS: denies shortness of breath with exertion  CARDIOVASCULAR: denies chest pain on exertion  GI: denies abdominal pain, denies  heartburn  : denies dysuria, vaginal discharge or itching, no complaint of urinary incontinence   MUSCULOSKELETAL: denies back pain  NEURO: denies headaches  PSYCHE: denies depression or anxiety  HEMATOLOGIC: denies hx of anemia  ENDOCRINE: denies thyroid history  ALL/ASTHMA: denies hx of allergy or asthma    EXAM:   /60   Pulse 58   Temp 97.9 °F (36.6 °C)   Ht 5' 2\" (1.575 m)   Wt 161 lb 6.4 oz (73.2 kg)   LMP  (LMP Unknown)   SpO2 97%   BMI 29.52 kg/m²  Estimated body mass index is 29.52 kg/m² as calculated from the following:    Height as of this encounter: 5' 2\" (1.575 m).    Weight as of this encounter: 161 lb 6.4 oz (73.2 kg).    Medicare Hearing Assessment  (Required for AWV/SWV)    Hearing Screening    Screening Method: Whisper Test  Whisper Test Result: Pass             Visual Acuity  Right Eye Visual Acuity: Corrected Right Eye Chart Acuity: 20/20   Left Eye Visual Acuity: Corrected Left Eye Chart Acuity: 20/20   Both Eyes Visual Acuity: Corrected Both Eyes Chart Acuity: 20/20   Able To Tolerate Visual Acuity: Yes      General Appearance:  Alert, cooperative, no distress, appears stated age   Head:  Normocephalic, without obvious abnormality, atraumatic   Eyes:  PERRL, conjunctiva/corneas clear, EOM's intact both eyes   Ears:  Normal TM's and external ear canals, both ears   Nose: Nares normal, septum midline,mucosa normal, no drainage or sinus tenderness   Throat: Lips, mucosa, and tongue normal; teeth and gums normal   Neck: Supple, symmetrical, trachea midline, no adenopathy;  thyroid: not enlarged, symmetric, no tenderness/mass/nodules; no carotid bruit or JVD   Back:   Symmetric, no curvature, ROM normal, no CVA tenderness   Lungs:   Clear to auscultation bilaterally, respirations unlabored   Heart:  Regular rate and rhythm, S1 and S2 normal, no murmur, rub, or gallop   Abdomen:   Soft, non-tender, bowel sounds active all four quadrants,  no masses, no organomegaly   Pelvic: Deferred    Extremities: Extremities normal, atraumatic, no cyanosis or edema   Pulses: 2+ and symmetric   Skin: Skin color, texture, turgor normal, no rashes or lesions   Lymph nodes: Cervical, supraclavicular, and axillary nodes normal   Neurologic: Normal, CN II through XII intact, 5 out of 5 muscle strength throughout, 2+ DTRs patellar tendons, normal gait       Breast exam: Chaperone - Son Barry  -Inspection: No suspicious skin abnormalities in either breast  -No nodules palpated in either breast, nontender throughout  -No axillary lymphadenopathy bilaterally      Vaccination History     Immunization History   Administered Date(s) Administered    Covid-19 Vaccine Pfizer 30 mcg/0.3 ml 02/10/2021, 03/03/2021, 11/11/2021, 06/10/2022    Covid-19 Vaccine Pfizer Bivalent 30mcg/0.3mL 10/03/2022    Covid-19 Vaccine Pfizer Darren-Sucrose 30 mcg/0.3 ml 06/10/2022    FLU VAC High Dose 65 YRS & Older PRSV Free (05227) 10/02/2018, 10/16/2019, 09/18/2020, 09/22/2021, 11/18/2022    FLUAD High Dose 65 yr and older (17339) 10/01/2018    FLUZONE 6 months and older PFS 0.5 ml (05226) 10/06/2015    Fluvirin, 3 Years & >, Im 10/01/2020    Fluzone Vaccine Medicare () 10/02/2017, 10/16/2019    HEP A 08/10/2012    High Dose Fluzone Influenza Vaccine, 65yr+ PF 0.5mL (55209) 10/03/2017    Influenza 10/28/2014, 01/01/2023    Pneumococcal (Prevnar 13) 04/24/2015    Pneumococcal Conjugate PCV20 06/10/2022    Pneumococcal Vaccine, Conjugate 01/01/2023    Pneumovax 23 04/05/2013, 07/27/2013    Zoster Vaccine Live (Zostavax) 09/18/2013        ASSESSMENT AND OTHER RELEVANT CHRONIC CONDITIONS:   Paola Kingston is a 78 year old female who presents for a Medicare Assessment.     Imaging:  MRI lumbar spine 2/15/2023  Impression   CONCLUSION:       1. Multilevel degenerative changes of the lumbar spine as detailed. Notable levels as follows:       2. L3-L4:  There is an 11 x 9 mm right subarticular zone disc extrusion, which results in moderate to  severe right lateral recess stenosis and effacement of the traversing right L4 nerve root; please correlate for right L4 radiculopathy.  Additional mild    spinal canal and mild right neural foraminal stenosis.   3. L5-S1:  Mild left and minor right neural foraminal stenosis.       4. Right kidney is not identified and likely surgically absent.       5. Lesser incidental findings as above.           MRI pelvis 2/25/2023  FINDINGS:   Diffuse fatty infiltration of the pelvic musculature, symmetric.       No marrow replacing lesion is seen.  There is no fracture.       Partial imaging of degenerative arthritis of the lower lumbar spine.  Sacral Tarlov cysts are noted.       Mild osteoarthritis of both hips and both sacroiliac joints.  Mild degenerative changes of the pubic symphysis.  Bilateral gluteus minimus/medius attachments on the greater trochanter are normal.  Bilateral hamstring attachments are intact.       Fluid-filled/prominent endometrial cavity is noted and partially imaged.                   Impression   CONCLUSION:       1. Degenerative changes of the pelvis.       2. Fluid-filled/prominent endometrial cavity.  This will require further workup with pelvic ultrasound..        Ultrasound pelvis 3/2/2023  Impression   CONCLUSION:  Thickened endometrium with complex heterogeneous appearance with cystic components.  Given patient's postmenopausal state, this is abnormal.  Consider endometrial biopsy to further evaluate.       Holter monitor 10/11/2023  - Frequent PACs 2%  - 62 atrial runs and maximal rate of 138 bpm  - Frequent PVCs 2.5%  - No sustained ventricular arrhythmias    CT chest/abdomen/pelvis 6/3/2024  FINDINGS:     Evaluation of parenchymal organs is limited due to lack of IV contrast.     LINES AND TUBES: None.     MEDIASTINUM/VASCULATURE: There are multiple mildly prominent mediastinal lymph nodes which are nonspecific and measure up to 1 cm in short axis. There is atherosclerotic calcification  of the aortic arch and thoracic aorta. The thoracic aorta is otherwise   unremarkable and not dilated. Mediastinal fat planes are preserved. Cardiac chambers are unremarkable. Pericardium is normal. There are coronary artery calcifications. The main pulmonary artery has a normal diameter and is otherwise unremarkable.     LUNGS AND PLEURA: There is bibasilar and lingular atelectasis and scarring. No pneumothorax.  No consolidation.  No pleural effusion. The trachea and central airways are unremarkable.  1.9 cm area of scarring seen within the medial aspect of the right  lower lobe likely represents compressive atelectasis from the adjacent vertebral body osteophyte and is grossly unchanged since 8/18/2020..     CHEST WALL/BONES: There is degenerative disease of the thoracic spine. The chest wall and osseous structures are otherwise unremarkable.    LIVER: Unremarkable  GALLBLADDER: The patient is post cholecystectomy.  BILIARY: Mild prominence of the common bile duct and intrahepatic ducts, an expected finding post cholecystectomy. No gross intra-or extrahepatic biliary ductal dilation.  SPLEEN: Unremarkable  PANCREAS: No gross ductal dilation.  ADRENALS: Unremarkable  KIDNEYS: Postoperative changes of a right nephrectomy.  No mass or loculated collection within the nephrectomy bed.  3 cm simple left renal cyst.  Mild nonspecific left perinephric stranding.  No left renal calculus or hydronephrosis.  AORTA/VASCULAR:   There is atherosclerotic calcification of the abdominal aorta extending into bilateral iliac arteries.  RETROPERITONEUM: No mass or enlarged adenopathy.    BOWEL: Diverticulosis is seen throughout the entire colon from the cecum to the sigmoid colon without of diverticulitis. The appendix is normal. There are no inflammatory changes within the right lower quadrant.  Small hiatal hernia with wall thickening   at the gastroesophageal junction. Remainder of the bowel is otherwise unremarkable without  dilation or wall thickening.  MESENTERY: Nonspecific soft tissue edema within the bilateral upper and lower quadrants. No mass or loculated collection.  PELVIS: Bladder wall thickening likely due to under distention. No mass or fluid collection.  No enlarged lymph nodes.  BONES:   There is degenerative disease of the thoracic and lumbar spine. Degenerative changes are seen within the sacroiliac joints and pubic symphysis. Degenerative changes are seen in the bilateral hips.  Left lateral spot view shin at L3-L4 with  interbody spacer.               Impression   CONCLUSION:     Post right nephrectomy.     No metastatic disease within the unenhanced chest, abdomen or pelvis.     Pandiverticulosis without diverticulitis.        Multiple other incidental findings as described in the body of the report.          Pathology:  Endometrial biopsy 3/9/2023  Final Diagnosis:      Endometrium; biopsy:   Mostly mucus with scanty fragments of endometrial stroma and benign endocervical glands present.  No evidence of hyperplasia or malignancy identified.  See comment.     Skin biopsy 7/17/2024  Final Diagnosis    Skin, distal right thigh, shave biopsy:  -Squamous cell carcinoma in situ.          PLAN SUMMARY:   Diagnoses and all orders for this visit:    Annual physical exam    CKD (chronic kidney disease), stage IV (HCC)  -     Basic Metabolic Panel (8); Future    Ischemic cardiomyopathy    Type 2 diabetes mellitus without complication, without use of insulin     Hyperlipidemia, mixed  -     Lipid Panel; Future    Essential hypertension    Vitamin D deficiency    Leukocytosis, unspecified type  -     CBC With Differential With Platelet; Future    Encounter for annual health examination    Recurrent epistaxis  -     ENT Referral - In Network    Other orders  -     oxymetazoline 0.05 % Nasal Solution; 1 spray by Nasal route daily as needed for congestion (Nosebleed).       Epistaxis  - May be multifactorial with use of aspirin,  possibly temperature changes  - Advise Afrin nasal spray as she did have an episode of the epistaxis today  - Can use on as-needed basis during times of bleeding  - May need to hold aspirin until Thursday 8/29  - She will notify us if there is any recurrent episodes may need to hold aspirin for longer duration  - Can see ENT if cautery is needed in the future.    Acute cystitis  - Urinalysis with urine culture ESBL greater than 100 CFU's  - Initial treatment with cephalexin resistant, switched to ciprofloxacin renally dosed     Paroxysmal atrial fibrillation  Transient A-fib while in PACU postop.  She was otherwise hemodynamically stable  - Zio patch reviewed as above  - Seen by cardiology, Dr. Hyde 4/10/2024, resting heart rate in the 50s for which metoprolol dose was maintained     Right lumbar radiculopathy  MRI lumbar --2/15/23-L3-L4 right disc extrusion, results in mod to severe right lateral recess stenosis and effacement of the traversing right L4 nerve root.  I suspect pain is related to L4 radiculopathy.  Patient has seen Dr. Power of physiatry.  She wishes another opinion.  Referred to pain management clinic.  Seen 4/11/2023, plans for SI joint injection on 4/18/2023.  If no improvement, may benefit from epidural injection.    -Status post right SI joint injection 4/18/2023 with minimal relief.  Most recently seen by Dr. Nava 7/21/2023.  Refractory to injections, for which she was recommended to consider operative management-spinal cord stimulator trial with Dr. Osiel Green  - Status post L3-L4 interbody fusion with posterior spinal fusion with Dr. Green 9/21/2023.  -Follow-up with Dr. Green neurosurgery  - She is very pleased with the results of her surgery     Thickened endometrium  -EMB  done 3/9/2023 by Dr. Mejia.  -Biopsy results show no evidence of hyperplasia or malignancy     Ischemic cardiomyopathy  Stented coronary artery  Hosp 5/2022 for CHF.  Cardiac cath 5/27/22-Dr. Swenson-stents  placed to LAD and diagonal.  An echo 2023-Dr. Jones-EF 36%, up from 30 to 35% in May 2022.  - Follow-up with Dr. Hyde in 4/10/2024, Entresto 97-23 twice daily, Toprol 50 mg twice a day, Jardiance 10 mg daily, isosorbide mononitrate 30 mg daily, hydralazine 50 mg 3 times a day.  Lasix only as needed     CKD (chronic kidney disease), stage IV (HCC)  has solitary kidney.  Sees Dr Salomon.   - Last seen by Dr. Salomon  continue to monitor stage IV CKD  - Last bMP Cr 1.84, EGFR 27 and stable  - Microalbumin to creatinine ratio to 15.3     Diabetes type 2  levimir 28 u/d (Dr Jones d/c Jardiance 10 mg in Aug 2022-since restarted) , NovoLog via SS.   Metformin d/c  RI after R nephrectomy.  Glimepiride stopped 2022 low sugars.   ophtho Dr Dodd.   Recent A1c:   HEMOGLOBIN A1C (%)   Date Value   2024 8.2 (A)     HgbA1C (%)   Date Value   2024 7.2 (H)     Recent urine microalbumin: No components found for: \"URINEMICROALBUMIN\"  Current medications: Levemir 28 units/day, NovoLog sliding scale, Jardiance 10 mg  Eye exam: Due for eye examination - sees Dr. Dodd  Foot exam: Seems to be well-controlled  - A1c much improved down to 7.2%.  Congratulated the patient on her progress     Hypertension   -metoprolol ER 50 mg bid, hydralazine 50 mg tid, Imdur 30 mg daily.     amlodipine 5 mg daily. (also on entresto).  Off Enalapril  for incr K.  Off hctz 25 mg  due to renal insuff.  - Continue checking blood pressures at home     Hyperlipidemia, mxd  -Continued on rosuvastatin  - , LDL 37  - Continue to optimize nutrition     Bereavement  -her   of lung cancer 4/3/22. She is doing ok w support system of family, etc. No depression. Declined med or counselor.     S/p R nephrectomy for renal cell carcinoma   -20 Dr Denise-12.0 cm -margins free.  MRI abd and CXR 21-Dr. Denise no recurrence or metastatic disease. Previously followed with Dr. Dutton   -Most recently seen by   6/10/2024, CT scan without recurrence     DJD right knee  - Xray R knee 11/18/21--severe DJD.  Dr Rose gave brianna shot 11/18/21.     S/p gout right big toe   9/2020 and 11/2021, prednisone rx.  uric acid 9/16/20 elev 6.8 (ref range 2.6-6.0).  Declined prev med.      Carotid bruits  -bilat. [facility] screening 10/3/17-carotids mild bilat--> mild bilat carotids on [facility] 9/26/18.     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 Leal -last seen 7/17/2024.  Distal right thigh unknown lesion, status post biopsy  -Pathology revealed squamous cell carcinoma in situ     Vitamin D deficiency  Vitamin D level had normalized  - Started on vitamin D3/cholecalciferol 2000 units once a day       HTN Screen: At goal  DM Screen: As above  HLD Screen: As above  Osteoporosis Screen (>65 or < 65 with FRAX > 9.3%): Consider repeat  HCV Screen: Considered low risk  HIV Screen: considered low risk  G/C/Syphilis: Considered low risk     Colon Cancer Screening (45-70): colonoscopy 3/9/18-Dr Martínez-diverticulosis, 3 mark polyps, lipoma hepatic flexture. Next 3 yrs--Dr Bryon Flores's name given at 6/2/21 vsiit. Declines when disc 5/9/22.  Reasonable to discontinue  Breast Cancer Screening (40-70): UTD  Cervical Cancer Screening (21-64): No longer indicated - follows with OBGYN  Lung Cancer Screening (55-79 with 30 p/year and active < 15 years): Not indicated     Influenza: Annually  Td/Tdap: Last Tdap - assess at next visit  Zoster (50+): Recommended that patient check with insurance company for coverage, can obtain 2 doses at the pharmacy  HPV (19-26): Not indicated  Pneumococcal: UTD    Immunization History   Administered Date(s) Administered    Covid-19 Vaccine Pfizer 30 mcg/0.3 ml 02/10/2021, 03/03/2021, 11/11/2021, 06/10/2022    Covid-19 Vaccine Pfizer Bivalent 30mcg/0.3mL 10/03/2022    Covid-19 Vaccine Pfizer Darren-Sucrose 30  mcg/0.3 ml 06/10/2022    FLU VAC High Dose 65 YRS & Older PRSV Free (62429) 10/02/2018, 10/16/2019, 09/18/2020, 09/22/2021, 11/18/2022    FLUAD High Dose 65 yr and older (05787) 10/01/2018    FLUZONE 6 months and older PFS 0.5 ml (58184) 10/06/2015    Fluvirin, 3 Years & >, Im 10/01/2020    Fluzone Vaccine Medicare () 10/02/2017, 10/16/2019    HEP A 08/10/2012    High Dose Fluzone Influenza Vaccine, 65yr+ PF 0.5mL (56731) 10/03/2017    Influenza 10/28/2014, 01/01/2023    Pneumococcal (Prevnar 13) 04/24/2015    Pneumococcal Conjugate PCV20 06/10/2022    Pneumococcal Vaccine, Conjugate 01/01/2023    Pneumovax 23 04/05/2013, 07/27/2013    Zoster Vaccine Live (Zostavax) 09/18/2013           Diet assessment: good     PLAN:  The patient indicates understanding of these issues and agrees to the plan.  Reinforced healthy diet, lifestyle, and exercise.    Return in about 6 months (around 2/27/2025) for Follow-up.     Artie Guerra MD, 8/17/2024     General Health     In the past six months, have you lost more than 10 pounds without trying?: 2 - No  Has your appetite been poor?: No  How does the patient maintain a good energy level?: Appropriate Exercise  How would you describe your daily physical activity?: Light  How would you describe your current health state?: Good  How do you maintain positive mental well-being?: Social Interaction;Visiting Friends;Visiting Family      This section provided for quick review of chart, separate sheet to patient  PREVENTATIVE SERVICES  INDICATIONS AND SCHEDULE Internal Lab or Procedure External Lab or Procedure   Diabetes Screening      HbgA1C   Annually Lab Results   Component Value Date    A1C 7.2 (H) 08/14/2024         No data to display                Fasting Blood Sugar (FSB)Annually Glucose (mg/dL)   Date Value   08/14/2024 184 (H)     GLUCOSE (mg/dL)   Date Value   11/28/2011 214 (H)          Cardiovascular Disease Screening     LDL Annually LDL Cholesterol (mg/dL)   Date Value    08/14/2024 37        EKG - w/ Initial Preventative Physical Exam only, or if medically necessary Electrocardiogram date09/22/2023       Colorectal Cancer Screening      Colonoscopy Screen every 10 years Health Maintenance   Topic Date Due    Colorectal Cancer Screening  03/09/2021    Update Health Maintenance if applicable    Flex Sigmoidoscopy Screen every 10 years No results found for this or any previous visit.      No data to display                 Fecal Occult Blood Annually No results found for: \"FOB\"      No data to display                Glaucoma Screening      Ophthalmology Visit Annually: Diabetics, FHx Glaucoma, AA>50, > 65      No data to display                Bone Density Screening      Dexascan Every two years Last Dexa Scan:    XR DEXA BONE DENSITOMETRY (CPT=77080) 03/05/2018        No data to display                Pap and Pelvic      Pap: Every 3 yrs age 21-65 or Pap+HPV every 5 yrs age 30-65, age 65 and older at high risk No recommendations at this time Update Health Maintenance if applicable    Chlamydia  Annually if high risk No results found for: \"CHLAMYDIA\"      No data to display                Screening Mammogram      Mammogram Annually to 75, then as discussed No recommendations at this time Update Health Maintenance if applicable     Immunizations (Update Immunization Activity if applicable)     Influenza  Covered Annually 1/1/2023 Please get every year    Pneumococcal 13 (Prevnar)  Covered Once after 65 01/01/2023 Please get once after your 65th birthday    Pneumococcal 23 (Pneumovax)  Covered Once after 65 07/27/2013 Please get once after your 65th birthday    Hepatitis B for Moderate/High Risk - Medium/high risk factors:   End-stage renal disease   Hemophiliacs who received Factor VIII or IX concentrates   Clients of institutions for the mentally retarded   Persons who live in the same house as a HepB virus carrier   Homosexual men   Illicit injectable drug abusers      Tetanus Toxoid  Only covered with a cut with metal- TD and TDaP Not covered by Medicare Part B - This may be covered with your prescription benefits, but Medicare does not cover unless Medically needed    Zoster  Not covered by Medicare Part B 09/18/2013 This may be covered with your pharmacy  prescription benefits      SPECIFIC DISEASE MONITORING Internal Lab or Procedure External Lab or Procedure        Annual Monitoring of Persistent Medications (ACE/ARB, digoxin diuretics, anticonvulsants)    Potassium Annually Lab Results   Component Value Date    K 4.9 08/14/2024         Creatinine   Annually Lab Results   Component Value Date    CREATSERUM 1.84 (H) 08/14/2024         BUN Annually Lab Results   Component Value Date    BUN 31 (H) 08/14/2024       Drug Serum Conc Annually No results found for: \"DIGOXIN\", \"DIG\", \"VALP\"              Diabetes      Hemoglobin A1C Annually; if last result is elevated, may be asked to retest more frequently.    Medicare covers every 3 months Lab Results   Component Value Date     (H) 08/14/2024    A1C 7.2 (H) 08/14/2024       No recommendations at this time    Creat/alb ratio Annually Lab Results   Component Value Date    MICROALBCREA 215.3 (H) 08/14/2024       LDL Annually Lab Results   Component Value Date    LDL 37 08/14/2024       Dilated Eye Exam Annually Last Diabetic Eye Exam:  No data recorded  No data recorded                Spent 30 minutes obtaining history, evaluating patient, discussing treatment options, diet, exercise, review of available labs and radiology reports, and completing documentation.     Template: [facility] [employer] ANNUAL ASSESSMENT FEMALE [07039]

## 2024-08-26 ENCOUNTER — OFFICE VISIT (OUTPATIENT)
Facility: CLINIC | Age: 79
End: 2024-08-26
Payer: MEDICARE

## 2024-08-26 VITALS
BODY MASS INDEX: 29 KG/M2 | HEART RATE: 54 BPM | WEIGHT: 161 LBS | DIASTOLIC BLOOD PRESSURE: 80 MMHG | SYSTOLIC BLOOD PRESSURE: 128 MMHG

## 2024-08-26 DIAGNOSIS — I50.9 CONGESTIVE HEART FAILURE, UNSPECIFIED HF CHRONICITY, UNSPECIFIED HEART FAILURE TYPE (HCC): ICD-10-CM

## 2024-08-26 DIAGNOSIS — Z90.5 SOLITARY KIDNEY, ACQUIRED: ICD-10-CM

## 2024-08-26 DIAGNOSIS — E11.21 DIABETIC GLOMERULOPATHY (HCC): ICD-10-CM

## 2024-08-26 DIAGNOSIS — C64.1 RENAL CELL CARCINOMA OF RIGHT KIDNEY (HCC): ICD-10-CM

## 2024-08-26 DIAGNOSIS — N18.32 STAGE 3B CHRONIC KIDNEY DISEASE (HCC): Primary | ICD-10-CM

## 2024-08-26 DIAGNOSIS — N18.32 HYPERTENSIVE KIDNEY DISEASE WITH STAGE 3B CHRONIC KIDNEY DISEASE (HCC): ICD-10-CM

## 2024-08-26 DIAGNOSIS — I12.9 HYPERTENSIVE KIDNEY DISEASE WITH STAGE 3B CHRONIC KIDNEY DISEASE (HCC): ICD-10-CM

## 2024-08-26 PROCEDURE — 99214 OFFICE O/P EST MOD 30 MIN: CPT | Performed by: INTERNAL MEDICINE

## 2024-08-26 NOTE — PROGRESS NOTES
Progress Note     Paola Kingston    Here for follow-up  Recently had a urinary tract infection, completed Cipro.      HISTORY:  Past Medical History:    Adenomatous colon polyp    colonoscopy 3/13 Dr. Martínez-also diverticulosis and int hem.    Allergic conjunctivitis    Dr. Dodd    Back problem    CAD (coronary artery disease)    Cardiac cath 22-Dr. Swenson-stents placed to LAD and diagonal.     Cancer (HCC)    Congestive heart disease (HCC)    Diabetes (HCC)    Diabetes mellitus (HCC)    Diverticulosis    Essential hypertension    Glaucoma    Gout    Hyperlipidemia    Hypertension, essential    Internal hemorrhoids    Ischemic cardiomyopathy    LBBB (left bundle branch block)    nl stress echo -Dr. Jones    Osteopenia    f/u dexa nl in     Renal cell carcinoma (HCC)     robotic asissted R radical nephrectomy 20 Dr Denise.    Type 2 diabetes mellitus (HCC)      Past Surgical History:   Procedure Laterality Date    Back surgery  [id number]    Cataract      Cataract extraction Bilateral     Dr. Dodd    Cholecystectomy  1981    at [facility]    Colonoscopy N/A 2018    Procedure: COLONOSCOPY, POSSIBLE BIOPSY, POSSIBLE POLYPECTOMY 44216;  Surgeon: Jersey Martínez MD;  [facility] [facility] localization wire 1 site left (cpt=[id number])      Nephrectomy Right 2020     robotic asissted R radical nephrectomy 20 Dr Denise.      1968           Medications (Active prior to today's visit):  Current Outpatient Medications   Medication Sig Dispense Refill    HYDRALAZINE 50 MG Oral Tab TAKE 1 TABLET 3 TIMES A  tablet 3    isosorbide mononitrate ER 30 MG Oral Tablet 24 Hr Take 1 tablet (30 mg total) by mouth daily. 90 tablet 3    empagliflozin (JARDIANCE) 10 MG Oral Tab Take 1 tablet (10 mg total) by mouth daily. 90 tablet 3    insulin glargine 100 UNIT/ML Subcutaneous Solution Pen-injector Inject 28 Units into the skin every morning. 25.2 mL 3     amLODIPine 5 MG Oral Tab Take 1 tablet (5 mg total) by mouth daily. 90 tablet 3    furosemide 20 MG Oral Tab Take 1 tablet (20 mg total) by mouth daily. 5/4 restarted 90 tablet 3    rosuvastatin 20 MG Oral Tab Take 1 tablet (20 mg total) by mouth nightly. 90 tablet 0    sacubitril-valsartan  MG Oral Tab Take 1 tablet by mouth 2 (two) times daily.      metoprolol succinate ER 50 MG Oral Tablet 24 Hr Take 1 tablet (50 mg total) by mouth 2 (two) times a day. 180 tablet 3    aspirin 81 MG Oral Tab EC Take 1 tablet (81 mg total) by mouth daily.  0    LUMIGAN 0.01 % Ophthalmic Solution Place 1 drop into both eyes daily.      FREESTYLE LITE TEST In Vitro Strip TEST 3 TIMES A  strip 3    Insulin Pen Needle (BD PEN NEEDLE MINI U/F) 31G X 5 MM Does not apply Misc Use three times daily with insulin. 300 each 3       Allergies:  Allergies   Allergen Reactions    Bactrim [Sulfamethoxazole W/Trimethoprim] NAUSEA AND VOMITING     Nausea and vomiting in 7/2018    Atorvastatin      Other reaction(s): ears ring         ROS:     Constitutional:  Negative for decreased activity, fever, irritability and lethargy  ENMT:  Negative for ear drainage, hearing loss and nasal drainage  Eyes:  Negative for eye discharge and vision loss  Cardiovascular:  Negative for chest pain, sob  Respiratory:  Negative for cough, dyspnea and wheezing  Gastrointestinal:  Negative for abdominal pain, constipation  Genitourinary:  Negative for dysuria and hematuria  Endocrine:  Negative for abnormal sleep patterns  Hema/Lymph:  Negative for easy bleeding and easy bruising  Integumentary:  Negative for pruritus and rash  Musculoskeletal:  Negative for bone/joint symptoms  Neurological:  Negative for gait disturbance  Psychiatric:  Negative for inappropriate interaction and psychiatric symptoms      Vitals:    08/26/24 1046   BP: 128/80   Pulse:        PHYSICAL EXAM:   Constitutional: appears well hydrated alert and responsive   Head/Face:  normocephalic  Eyes/Vision: normal extraocular motion is intact  Nose/Mouth/Throat:mucous membranes are moist   Neck/Thyroid: neck is supple without adenopathy  Lymphatic: no abnormal cervical, supraclavicular adenopathy is noted  Respiratory:  lungs are clear to auscultation bilaterally  Cardiovascular: regular rate and rhythm   Abdomen: soft, non-tender, non-distended, BS normal  Skin/Hair: no unusual rashes present, no abnormal bruising noted  Musculoskeletal: no deformities  Extremities: no edema  Neurological:  Grossly normal       Lab Results   Component Value Date     (H) 08/14/2024     08/14/2024    K 4.9 08/14/2024     08/14/2024    CO2 22.0 08/14/2024    ANIONGAP 8 08/14/2024    BUN 31 (H) 08/14/2024    CREATSERUM 1.84 (H) 08/14/2024    CA 8.9 08/14/2024    OSMOCALC 303 (H) 08/14/2024    EGFRCR 27 (L) 08/14/2024    ALB 4.2 08/14/2024    PHOS 4.2 02/15/2024         ASSESSMENT/PLAN:   Assessment   1. Stage 3b chronic kidney disease (HCC)  GFR is slightly lower than usual.  I have a theory that patient is a little dehydrated, asked her to increase hydration    2. Renal cell carcinoma of right kidney (HCC)  S/p right radical nephrectomy 9/2020  The patient used to follow-up with Dr. Dutton, now sees Dr. Singh.  Surveillance scan next year.  I am not certain she will be able to tolerate contrast    3. Solitary kidney, acquired  As above    4. Hypertensive kidney disease with stage 3b chronic kidney disease (HCC)  Continue Entresto, metoprolol, hydralazine, amlodipine, isosorbide mononitrate.  Blood pressures running a little lower than usual    5. Diabetic glomerulopathy (HCC)  Insulin and Jardiance    6. Congestive heart failure, unspecified HF chronicity, unspecified heart failure type (HCC)  She takes Entresto and furosemide 20 mg daily.  She is also on Jardiance           Orders This Visit:  Orders Placed This Encounter   Procedures    Renal Function Panel    PTH, Intact    Urinalysis,  Routine    Microalb/Creat Ratio, Random Urine       Meds This Visit:  Requested Prescriptions      No prescriptions requested or ordered in this encounter       Imaging & Referrals:  None     8/26/2024   CATHRYN MAYA MD    Return in about 6 months (around 2/26/2025).

## 2024-08-27 ENCOUNTER — OFFICE VISIT (OUTPATIENT)
Dept: INTERNAL MEDICINE CLINIC | Facility: CLINIC | Age: 79
End: 2024-08-27
Payer: MEDICARE

## 2024-08-27 VITALS
SYSTOLIC BLOOD PRESSURE: 100 MMHG | HEART RATE: 58 BPM | OXYGEN SATURATION: 97 % | BODY MASS INDEX: 29.7 KG/M2 | TEMPERATURE: 98 F | WEIGHT: 161.38 LBS | HEIGHT: 62 IN | DIASTOLIC BLOOD PRESSURE: 60 MMHG

## 2024-08-27 DIAGNOSIS — Z00.00 ENCOUNTER FOR ANNUAL HEALTH EXAMINATION: ICD-10-CM

## 2024-08-27 DIAGNOSIS — E11.9 TYPE 2 DIABETES MELLITUS WITHOUT COMPLICATION, WITHOUT LONG-TERM CURRENT USE OF INSULIN (HCC): ICD-10-CM

## 2024-08-27 DIAGNOSIS — R04.0 RECURRENT EPISTAXIS: ICD-10-CM

## 2024-08-27 DIAGNOSIS — D72.829 LEUKOCYTOSIS, UNSPECIFIED TYPE: ICD-10-CM

## 2024-08-27 DIAGNOSIS — I25.5 ISCHEMIC CARDIOMYOPATHY: ICD-10-CM

## 2024-08-27 DIAGNOSIS — N18.4 CKD (CHRONIC KIDNEY DISEASE), STAGE IV (HCC): ICD-10-CM

## 2024-08-27 DIAGNOSIS — E55.9 VITAMIN D DEFICIENCY: ICD-10-CM

## 2024-08-27 DIAGNOSIS — I10 ESSENTIAL HYPERTENSION: ICD-10-CM

## 2024-08-27 DIAGNOSIS — E78.2 HYPERLIPIDEMIA, MIXED: ICD-10-CM

## 2024-08-27 DIAGNOSIS — Z00.00 ANNUAL PHYSICAL EXAM: Primary | ICD-10-CM

## 2024-08-27 PROCEDURE — 99214 OFFICE O/P EST MOD 30 MIN: CPT | Performed by: INTERNAL MEDICINE

## 2024-08-27 PROCEDURE — G0439 PPPS, SUBSEQ VISIT: HCPCS | Performed by: INTERNAL MEDICINE

## 2024-08-27 RX ORDER — OXYMETAZOLINE HYDROCHLORIDE 0.05 G/100ML
1 SPRAY NASAL DAILY PRN
Qty: 15 ML | Refills: 0 | Status: SHIPPED | OUTPATIENT
Start: 2024-08-27

## 2024-09-23 ENCOUNTER — TELEPHONE (OUTPATIENT)
Dept: INTERNAL MEDICINE CLINIC | Facility: CLINIC | Age: 79
End: 2024-09-23

## 2024-09-23 NOTE — TELEPHONE ENCOUNTER
1 yr ordered on 12/4/2023. Refill too soon.  Medication Quantity Refills Start End   insulin glargine 100 UNIT/ML Subcutaneous Solution Pen-injector 25.2 mL 3 12/4/2023

## 2024-10-18 RX ORDER — AMLODIPINE BESYLATE 5 MG/1
5 TABLET ORAL DAILY
Qty: 90 TABLET | Refills: 3 | Status: SHIPPED | OUTPATIENT
Start: 2024-10-18

## 2024-10-18 NOTE — TELEPHONE ENCOUNTER
Refill request is for a maintenance medication and has met the criteria specified in the Ambulatory Medication Refill Standing Order for eligibility, visits, laboratory, alerts and was sent to the requested pharmacy.    Requested Prescriptions     Signed Prescriptions Disp Refills    AMLODIPINE 5 MG Oral Tab 90 tablet 3     Sig: TAKE 1 TABLET DAILY     Authorizing Provider: AMRY ARREOLA     Ordering User: MAGED CAT

## 2024-11-15 ENCOUNTER — TELEPHONE (OUTPATIENT)
Dept: INTERNAL MEDICINE CLINIC | Facility: CLINIC | Age: 79
End: 2024-11-15

## 2024-11-15 NOTE — TELEPHONE ENCOUNTER
Spoke to pt - she does not want mail order;   sent local    Requested Prescriptions     Signed Prescriptions Disp Refills    insulin glargine 100 UNIT/ML Subcutaneous Solution Pen-injector 30 mL 3     Sig: Inject 28 Units into the skin every morning.     Authorizing Provider: MARY ARREOLA     Ordering User: MARIMAR SYKES

## 2024-11-15 NOTE — TELEPHONE ENCOUNTER
Fax received from [facility] requesting 90 day supply for Basaglar 100 unit/ML kwikpen

## 2025-02-11 ENCOUNTER — LAB ENCOUNTER (OUTPATIENT)
Dept: LAB | Age: 80
End: 2025-02-11
Attending: INTERNAL MEDICINE
Payer: MEDICARE

## 2025-02-11 DIAGNOSIS — N18.32 STAGE 3B CHRONIC KIDNEY DISEASE (HCC): ICD-10-CM

## 2025-02-11 LAB
ALBUMIN SERPL-MCNC: 4.3 G/DL (ref 3.2–4.8)
ANION GAP SERPL CALC-SCNC: 9 MMOL/L (ref 0–18)
BILIRUB UR QL: NEGATIVE
BUN BLD-MCNC: 27 MG/DL (ref 9–23)
BUN/CREAT SERPL: 17.2 (ref 10–20)
CALCIUM BLD-MCNC: 8.8 MG/DL (ref 8.7–10.4)
CHLORIDE SERPL-SCNC: 107 MMOL/L (ref 98–112)
CLARITY UR: CLEAR
CO2 SERPL-SCNC: 24 MMOL/L (ref 21–32)
COLOR UR: COLORLESS
CREAT BLD-MCNC: 1.57 MG/DL
CREAT UR-SCNC: 31.4 MG/DL
EGFRCR SERPLBLD CKD-EPI 2021: 33 ML/MIN/1.73M2 (ref 60–?)
GLUCOSE BLD-MCNC: 159 MG/DL (ref 70–99)
GLUCOSE UR-MCNC: >1000 MG/DL
HGB UR QL STRIP.AUTO: NEGATIVE
KETONES UR-MCNC: NEGATIVE MG/DL
LEUKOCYTE ESTERASE UR QL STRIP.AUTO: NEGATIVE
MICROALBUMIN UR-MCNC: 28.8 MG/DL
MICROALBUMIN/CREAT 24H UR-RTO: 917.2 UG/MG (ref ?–30)
NITRITE UR QL STRIP.AUTO: NEGATIVE
OSMOLALITY SERPL CALC.SUM OF ELEC: 298 MOSM/KG (ref 275–295)
PH UR: 5 [PH] (ref 5–8)
PHOSPHATE SERPL-MCNC: 4.8 MG/DL
PHOSPHATE SERPL-MCNC: 4.8 MG/DL (ref 2.4–5.1)
POTASSIUM SERPL-SCNC: 3.7 MMOL/L (ref 3.5–5.1)
PROT UR-MCNC: 30 MG/DL
PTH-INTACT SERPL-MCNC: 174 PG/ML
PTH-INTACT SERPL-MCNC: 176.2 PG/ML (ref 18.5–88)
SODIUM SERPL-SCNC: 140 MMOL/L (ref 136–145)
SP GR UR STRIP: 1.01 (ref 1–1.03)
UROBILINOGEN UR STRIP-ACNC: NORMAL
YEAST UR QL: PRESENT /HPF

## 2025-02-11 PROCEDURE — 83970 ASSAY OF PARATHORMONE: CPT

## 2025-02-11 PROCEDURE — 81001 URINALYSIS AUTO W/SCOPE: CPT

## 2025-02-11 PROCEDURE — 82570 ASSAY OF URINE CREATININE: CPT

## 2025-02-11 PROCEDURE — 82043 UR ALBUMIN QUANTITATIVE: CPT

## 2025-02-11 PROCEDURE — 36415 COLL VENOUS BLD VENIPUNCTURE: CPT

## 2025-02-11 PROCEDURE — 80069 RENAL FUNCTION PANEL: CPT

## 2025-02-20 ENCOUNTER — OFFICE VISIT (OUTPATIENT)
Facility: CLINIC | Age: 80
End: 2025-02-20
Payer: MEDICARE

## 2025-02-20 VITALS
HEART RATE: 59 BPM | BODY MASS INDEX: 30 KG/M2 | WEIGHT: 163 LBS | DIASTOLIC BLOOD PRESSURE: 82 MMHG | SYSTOLIC BLOOD PRESSURE: 138 MMHG

## 2025-02-20 DIAGNOSIS — C64.1 RENAL CELL CARCINOMA OF RIGHT KIDNEY (HCC): ICD-10-CM

## 2025-02-20 DIAGNOSIS — N18.32 STAGE 3B CHRONIC KIDNEY DISEASE (HCC): Primary | ICD-10-CM

## 2025-02-20 DIAGNOSIS — I50.22 CHRONIC SYSTOLIC HEART FAILURE (HCC): ICD-10-CM

## 2025-02-20 DIAGNOSIS — I12.9 HYPERTENSIVE KIDNEY DISEASE WITH STAGE 3B CHRONIC KIDNEY DISEASE (HCC): ICD-10-CM

## 2025-02-20 DIAGNOSIS — E11.21 DIABETIC GLOMERULOPATHY (HCC): ICD-10-CM

## 2025-02-20 DIAGNOSIS — N18.32 HYPERTENSIVE KIDNEY DISEASE WITH STAGE 3B CHRONIC KIDNEY DISEASE (HCC): ICD-10-CM

## 2025-02-20 PROCEDURE — 99214 OFFICE O/P EST MOD 30 MIN: CPT | Performed by: INTERNAL MEDICINE

## 2025-02-20 NOTE — PROGRESS NOTES
Progress Note     Paola Kingston      Here for follow-up  No issues    HISTORY:  Past Medical History:    Adenomatous colon polyp    colonoscopy 3/13 Dr. Martínez-also diverticulosis and int hem.    Allergic conjunctivitis    Dr. Dodd    Back problem    CAD (coronary artery disease)    Cardiac cath 22-Dr. Swenson-stents placed to LAD and diagonal.     Cancer (HCC)    Congestive heart disease (HCC)    Diabetes (HCC)    Diabetes mellitus (HCC)    Diverticulosis    Essential hypertension    Glaucoma    Gout    Hyperlipidemia    Hypertension, essential    Internal hemorrhoids    Ischemic cardiomyopathy    LBBB (left bundle branch block)    nl stress echo -Dr. Jones    Osteopenia    f/u dexa nl in     Renal cell carcinoma (HCC)     robotic asissted R radical nephrectomy 20 Dr Denise.    Type 2 diabetes mellitus (HCC)      Past Surgical History:   Procedure Laterality Date    Back surgery  [id number]    Cataract      Cataract extraction Bilateral     Dr. Dodd    Cholecystectomy  1981    at [facility]    Colonoscopy N/A 2018    Procedure: COLONOSCOPY, POSSIBLE BIOPSY, POSSIBLE POLYPECTOMY 70662;  Surgeon: Jersey Martínez MD;  Location: [place] [facility] localization wire 1 site left (cpt=[id number])      Nephrectomy Right 2020     robotic asissted R radical nephrectomy 20 Dr Denise.      1968           Medications (Active prior to today's visit):  Current Outpatient Medications   Medication Sig Dispense Refill    insulin glargine (BASAGLAR KWIKPEN) 100 UNIT/ML Subcutaneous Solution Pen-injector Inject 28 Units into the skin every morning. 30 mL 3    AMLODIPINE 5 MG Oral Tab TAKE 1 TABLET DAILY 90 tablet 3    oxymetazoline 0.05 % Nasal Solution 1 spray by Nasal route daily as needed for congestion (Nosebleed). 15 mL 0    HYDRALAZINE 50 MG Oral Tab TAKE 1 TABLET 3 TIMES A  tablet 3    empagliflozin (JARDIANCE) 10 MG Oral Tab Take 1 tablet (10 mg  total) by mouth daily. 90 tablet 3    furosemide 20 MG Oral Tab Take 1 tablet (20 mg total) by mouth daily. 5/4 restarted 90 tablet 3    rosuvastatin 20 MG Oral Tab Take 1 tablet (20 mg total) by mouth nightly. 90 tablet 0    sacubitril-valsartan  MG Oral Tab Take 1 tablet by mouth 2 (two) times daily.      metoprolol succinate ER 50 MG Oral Tablet 24 Hr Take 1 tablet (50 mg total) by mouth 2 (two) times a day. 180 tablet 3    aspirin 81 MG Oral Tab EC Take 1 tablet (81 mg total) by mouth daily.  0    LUMIGAN 0.01 % Ophthalmic Solution Place 1 drop into both eyes daily.      isosorbide mononitrate ER 30 MG Oral Tablet 24 Hr Take 1 tablet (30 mg total) by mouth daily. 90 tablet 3    FREESTYLE LITE TEST In Vitro Strip TEST 3 TIMES A  strip 3    Insulin Pen Needle (BD PEN NEEDLE MINI U/F) 31G X 5 MM Does not apply Misc Use three times daily with insulin. 300 each 3       Allergies:  Allergies[1]      ROS:     Constitutional:  Negative for decreased activity, fever, irritability and lethargy  ENMT:  Negative for ear drainage, hearing loss and nasal drainage  Eyes:  Negative for eye discharge and vision loss  Cardiovascular:  Negative for chest pain, sob  Respiratory:  Negative for cough, dyspnea and wheezing  Gastrointestinal:  Negative for abdominal pain, constipation  Genitourinary:  Negative for dysuria and hematuria  Endocrine:  Negative for abnormal sleep patterns  Hema/Lymph:  Negative for easy bleeding and easy bruising  Integumentary:  Negative for pruritus and rash  Musculoskeletal:  Negative for bone/joint symptoms  Neurological:  Negative for gait disturbance  Psychiatric:  Negative for inappropriate interaction and psychiatric symptoms      Vitals:    02/20/25 1006   BP: 138/82   Pulse:        PHYSICAL EXAM:   Constitutional: appears well hydrated alert and responsive   Head/Face: normocephalic  Eyes/Vision: normal extraocular motion is intact  Nose/Mouth/Throat:mucous membranes are moist    Neck/Thyroid: neck is supple without adenopathy  Lymphatic: no abnormal cervical, supraclavicular adenopathy is noted  Respiratory:  lungs are clear to auscultation bilaterally  Cardiovascular: regular rate and rhythm   Abdomen: soft, non-tender, non-distended, BS normal  Skin/Hair: no unusual rashes present, no abnormal bruising noted  Musculoskeletal: no deformities  Extremities: no edema  Neurological:  Grossly normal       Lab Results   Component Value Date     (H) 02/11/2025     02/11/2025    K 3.7 02/11/2025     02/11/2025    CO2 24.0 02/11/2025    ANIONGAP 9 02/11/2025    BUN 27 (H) 02/11/2025    CREATSERUM 1.57 (H) 02/11/2025    CA 8.8 02/11/2025    OSMOCALC 298 (H) 02/11/2025    EGFRCR 33 (L) 02/11/2025    ALB 4.3 02/11/2025    PHOS 4.8 02/11/2025         ASSESSMENT/PLAN:   Assessment   1. Stage 3b chronic kidney disease (HCC)  GFR is stable at 33  Pt is high risk for contrast nephropathy    2. Renal cell carcinoma of right kidney (HCC)  S/p right radical nephrectomy 9/2020   Follows with Dr. Mireles  Scheduled to have a CT scan of the abdomen pelvis, my advice would be to do a noncontrast study    3. Hypertensive kidney disease with stage 3b chronic kidney disease (HCC)  Blood pressure is stable on zotepine, metoprolol, Entresto, hydralazine, Lasix    4. Diabetic glomerulopathy (HCC)  Continues on insulin    5. Chronic systolic heart failure (HCC)  With cardiology.  Stable on Entresto and Lasix             Orders This Visit:  Orders Placed This Encounter   Procedures    Renal Function Panel    PTH, Intact    Microalb/Creat Ratio, Random Urine    Vitamin D       Meds This Visit:  Requested Prescriptions      No prescriptions requested or ordered in this encounter       Imaging & Referrals:  None     2/20/2025   CATHRYN MAYA MD    Return in about 6 months (around 8/20/2025).         [1]   Allergies  Allergen Reactions    Bactrim [Sulfamethoxazole W/Trimethoprim] NAUSEA AND VOMITING      Nausea and vomiting in 7/2018    Atorvastatin      Other reaction(s): ears ring

## 2025-03-11 ENCOUNTER — LAB ENCOUNTER (OUTPATIENT)
Dept: LAB | Age: 80
End: 2025-03-11
Attending: INTERNAL MEDICINE
Payer: MEDICARE

## 2025-03-11 DIAGNOSIS — N18.4 CKD (CHRONIC KIDNEY DISEASE), STAGE IV (HCC): ICD-10-CM

## 2025-03-11 DIAGNOSIS — D72.829 LEUKOCYTOSIS, UNSPECIFIED TYPE: ICD-10-CM

## 2025-03-11 DIAGNOSIS — E78.2 HYPERLIPIDEMIA, MIXED: ICD-10-CM

## 2025-03-11 LAB
ANION GAP SERPL CALC-SCNC: 9 MMOL/L (ref 0–18)
BASOPHILS # BLD AUTO: 0.03 X10(3) UL (ref 0–0.2)
BASOPHILS NFR BLD AUTO: 0.4 %
BUN BLD-MCNC: 31 MG/DL (ref 9–23)
BUN/CREAT SERPL: 18.9 (ref 10–20)
CALCIUM BLD-MCNC: 8.7 MG/DL (ref 8.7–10.4)
CHLORIDE SERPL-SCNC: 107 MMOL/L (ref 98–112)
CHOLEST SERPL-MCNC: 112 MG/DL (ref ?–200)
CO2 SERPL-SCNC: 24 MMOL/L (ref 21–32)
CREAT BLD-MCNC: 1.64 MG/DL
DEPRECATED RDW RBC AUTO: 43.4 FL (ref 35.1–46.3)
EGFRCR SERPLBLD CKD-EPI 2021: 31 ML/MIN/1.73M2 (ref 60–?)
EOSINOPHIL # BLD AUTO: 0.19 X10(3) UL (ref 0–0.7)
EOSINOPHIL NFR BLD AUTO: 2.7 %
ERYTHROCYTE [DISTWIDTH] IN BLOOD BY AUTOMATED COUNT: 13.2 % (ref 11–15)
FASTING PATIENT LIPID ANSWER: YES
FASTING STATUS PATIENT QL REPORTED: YES
GLUCOSE BLD-MCNC: 193 MG/DL (ref 70–99)
HCT VFR BLD AUTO: 37.7 %
HDLC SERPL-MCNC: 38 MG/DL (ref 40–59)
HGB BLD-MCNC: 12.9 G/DL
IMM GRANULOCYTES # BLD AUTO: 0.02 X10(3) UL (ref 0–1)
IMM GRANULOCYTES NFR BLD: 0.3 %
LDLC SERPL CALC-MCNC: 43 MG/DL (ref ?–100)
LYMPHOCYTES # BLD AUTO: 0.68 X10(3) UL (ref 1–4)
LYMPHOCYTES NFR BLD AUTO: 9.7 %
MCH RBC QN AUTO: 30.7 PG (ref 26–34)
MCHC RBC AUTO-ENTMCNC: 34.2 G/DL (ref 31–37)
MCV RBC AUTO: 89.8 FL
MONOCYTES # BLD AUTO: 0.47 X10(3) UL (ref 0.1–1)
MONOCYTES NFR BLD AUTO: 6.7 %
NEUTROPHILS # BLD AUTO: 5.59 X10 (3) UL (ref 1.5–7.7)
NEUTROPHILS # BLD AUTO: 5.59 X10(3) UL (ref 1.5–7.7)
NEUTROPHILS NFR BLD AUTO: 80.2 %
NONHDLC SERPL-MCNC: 74 MG/DL (ref ?–130)
OSMOLALITY SERPL CALC.SUM OF ELEC: 302 MOSM/KG (ref 275–295)
PLATELET # BLD AUTO: 199 10(3)UL (ref 150–450)
POTASSIUM SERPL-SCNC: 4.8 MMOL/L (ref 3.5–5.1)
RBC # BLD AUTO: 4.2 X10(6)UL
SODIUM SERPL-SCNC: 140 MMOL/L (ref 136–145)
TRIGL SERPL-MCNC: 194 MG/DL (ref 30–149)
VLDLC SERPL CALC-MCNC: 27 MG/DL (ref 0–30)
WBC # BLD AUTO: 7 X10(3) UL (ref 4–11)

## 2025-03-11 PROCEDURE — 36415 COLL VENOUS BLD VENIPUNCTURE: CPT

## 2025-03-11 PROCEDURE — 80061 LIPID PANEL: CPT

## 2025-03-11 PROCEDURE — 85025 COMPLETE CBC W/AUTO DIFF WBC: CPT

## 2025-03-11 PROCEDURE — 80048 BASIC METABOLIC PNL TOTAL CA: CPT

## 2025-03-19 NOTE — PROGRESS NOTES
Chief Complaint:   Chief Complaint   Patient presents with    Checkup     6 month         HPI:     Ms. FLOYD is a 79 year old female PMHX ischemic cardiomyopathy, CKD stage IV, type 2 diabetes, hypertension, hyperlipidemia, history of renal cell carcinoma status post right nephrectomy, degenerative joint disease coming in for follow-up    No further epistaxis    Knees are bothersome from time to time.  Has to take some breaks periodically.  Very rarely uses Tylenol.  Not really requiring any pain medication.    Appetite regular, meat, veggies. Some potatoes. Frozen fruits - apples. Lots of water  Regular bovements.     Sugars at home running 140-200.  Has been off of short acting insulin since around the time of her surgery 2023.  She was able to bring down her sugar levels with nutrition changes.    Past Medical History:    Adenomatous colon polyp    colonoscopy 3/13 Dr. Martínez-also diverticulosis and int hem.    Allergic conjunctivitis    Dr. Dodd    Back problem    CAD (coronary artery disease)    Cardiac cath 5/27/22-Dr. Swenson-stents placed to LAD and diagonal.     Cancer (HCC)    Congestive heart disease (HCC)    Diabetes (HCC)    Diabetes mellitus (HCC)    Diverticulosis    Essential hypertension    Glaucoma    Gout    Hyperlipidemia    Hypertension, essential    Internal hemorrhoids    Ischemic cardiomyopathy    LBBB (left bundle branch block)    nl stress echo 6/14-Dr. Jones    Osteopenia    f/u dexa nl in 9/14    Renal cell carcinoma (HCC)     robotic asissted R radical nephrectomy 9/2/20 Dr Denise.    Type 2 diabetes mellitus (HCC)     Past Surgical History:   Procedure Laterality Date    Back surgery  [id number]    Cataract  1998    Cataract extraction Bilateral 2014    Dr. Dodd    Cholecystectomy  1981    at [facility]    Colonoscopy N/A 03/09/2018    Procedure: COLONOSCOPY, POSSIBLE BIOPSY, POSSIBLE POLYPECTOMY 03190;  Surgeon: Jersey Martínez MD;  [place] [facility] [place]    Yana  localization wire 1 site left (cpt=19281)      Nephrectomy Right 2020     robotic asissted R radical nephrectomy 20 Dr Denise.      1968     Social History:  Social History     Socioeconomic History    Marital status:    Tobacco Use    Smoking status: Former     Current packs/day: 0.00     Average packs/day: 1 pack/day for 20.0 years (20.0 ttl pk-yrs)     Types: Cigarettes     Start date: 1973     Quit date: 1993     Years since quittin.5     Passive exposure: Never    Smokeless tobacco: Never   Vaping Use    Vaping status: Never Used   Substance and Sexual Activity    Alcohol use: No    Drug use: No   Other Topics Concern    Caffeine Concern No    Exercise No   Social History Narrative    The patient uses the following assistive device(s):  rolling walker.      The patient does live in a home with stairs.     Family History:  Family History   Problem Relation Age of Onset    Stroke Father          age 80    Other (unknown cause of death) Mother          age 83    Diabetes Mother     Hypertension Brother     Other (1 brother) Other         living and well    Other (1 son, 1 daughter) Other     Breast Cancer Neg      Allergies:  Allergies   Allergen Reactions    Bactrim [Sulfamethoxazole W/Trimethoprim] NAUSEA AND VOMITING     Nausea and vomiting in 2018    Atorvastatin      Other reaction(s): ears ring     Current Meds:  Current Outpatient Medications   Medication Sig Dispense Refill    insulin glargine (BASAGLAR KWIKPEN) 100 UNIT/ML Subcutaneous Solution Pen-injector Inject 28 Units into the skin every morning. 30 mL 3    AMLODIPINE 5 MG Oral Tab TAKE 1 TABLET DAILY 90 tablet 3    oxymetazoline 0.05 % Nasal Solution 1 spray by Nasal route daily as needed for congestion (Nosebleed). 15 mL 0    HYDRALAZINE 50 MG Oral Tab TAKE 1 TABLET 3 TIMES A  tablet 3    isosorbide mononitrate ER 30 MG Oral Tablet 24 Hr Take 1 tablet (30 mg total) by mouth daily. 90 tablet  3    FREESTYLE LITE TEST In Vitro Strip TEST 3 TIMES A  strip 3    empagliflozin (JARDIANCE) 10 MG Oral Tab Take 1 tablet (10 mg total) by mouth daily. 90 tablet 3    furosemide 20 MG Oral Tab Take 1 tablet (20 mg total) by mouth daily. 5/4 restarted 90 tablet 3    rosuvastatin 20 MG Oral Tab Take 1 tablet (20 mg total) by mouth nightly. 90 tablet 0    sacubitril-valsartan  MG Oral Tab Take 1 tablet by mouth 2 (two) times daily.      Insulin Pen Needle (BD PEN NEEDLE MINI U/F) 31G X 5 MM Does not apply Misc Use three times daily with insulin. 300 each 3    metoprolol succinate ER 50 MG Oral Tablet 24 Hr Take 1 tablet (50 mg total) by mouth 2 (two) times a day. 180 tablet 3    aspirin 81 MG Oral Tab EC Take 1 tablet (81 mg total) by mouth daily.  0    LUMIGAN 0.01 % Ophthalmic Solution Place 1 drop into both eyes daily.        Counseling given: Not Answered       REVIEW OF SYSTEMS:   Positive Findings indicated in BOLD    Constitutional: Fever, Chills, Weight Gain, Weight Loss, Night Sweats, Fatigue, Malaise  ENT/Mouth:  Hearing Changes, Ear Pain, Nasal Congestion, Sinus Pain, Hoarseness, Sore throat, Rhinorrhea, Swallowing Difficulty  Eyes: Eye Pain, Swelling, Redness, Foreign Body, Discharge, Vision Changes  Cardiovascular: Chest Pain, SOB, PND, Dyspnea on Exertion, Orthopnea, Claudication, Edema, Palpitations  Respiratory: Cough, Sputum, Wheezing, Shortness of breath  Gastrointestinal: Nausea, Vomiting, Diarrhea, Constipation, Pain, Heartburn, Dysphagia, Bloody stools, Tarry stools  Genitourinary: Dysmenorrhea, Dysuria, Urinary Frequency, Hematuria, Urinary Incontinence, Urgency,  Flank Pain  Musculoskeletal: Arthralgias, Myalgias, Joint Swelling, Joint Stiffness, Back Pain, Neck Pain  Integumentary: Skin Lesions, Pruritis, Hair Changes, Jaundice, Nail changes  Neuro: Weakness, Numbness, Paresthesias, Loss of Consciousness, Syncope, Dizziness, Headache, Falls  Psych: Anxiety, Depression, Insomnia,  Suicidal Ideation, Homicidal ideation, Memory Changes  Heme/Lymph: Bruising, Bleeding, Lymphadenopathy  Endocrine: Polyuria, Polydipsia, Temperature Intolerance    EXAM:   Vital Signs:  Blood pressure 112/64, pulse 68, temperature 97.3 °F (36.3 °C), temperature source Oral, height 5' 2.75\" (1.594 m), weight 161 lb (73 kg), not currently breastfeeding.     Constitutional: No acute distress. Alert and oriented x 3. Examined in wheelchair  Eyes: EOMI, PERRLA, clear sclera b/l  HENT: NCAT, Moist mucous membranes, Oropharynx without erythema or exudates  Cardiovascular: S1, S2, no S3, no S4, Regular rate and rhythm, No murmurs/gallops/rubs.   Respiratory: Clear to auscultation bilaterally.  No wheezes/rales/rhonchi  Gastrointestinal: Soft, nontender, nondistended. Positive bowel sounds x 4.   Genitourinary: No CVA tenderness bilaterally  Neurologic: No focal neurological deficits, CN II-XII intact, light touch intact, MSK Strength 5/5 BLE  Musculoskeletal: Full range of motion of all extremities  Skin: No lesions, No erythema, no jaundice, Cap Refill < 2s  Psychiatric: Appropriate mood and affect  Heme/Lymph/Immune: No cervical LAD    Diabetic foot examination  - No visible ulcers, wounds  - Nails within normal appearance  - Vibration sense intact bilaterally  - Monofilament x 4 areas bilateral and symmetrical-great big toe, plantar surface, dorsal surface, lateral foot        DATA REVIEWED   Labs:  Recent Results (from the past 8760 hours)   Basic Metabolic Panel (8)    Collection Time: 03/11/25 10:50 AM   Result Value Ref Range    Glucose 193 (H) 70 - 99 mg/dL    Sodium 140 136 - 145 mmol/L    Potassium 4.8 3.5 - 5.1 mmol/L    Chloride 107 98 - 112 mmol/L    CO2 24.0 21.0 - 32.0 mmol/L    Anion Gap 9 0 - 18 mmol/L    BUN 31 (H) 9 - 23 mg/dL    Creatinine 1.64 (H) 0.55 - 1.02 mg/dL    BUN/CREA Ratio 18.9 10.0 - 20.0    Calcium, Total 8.7 8.7 - 10.4 mg/dL    Calculated Osmolality 302 (H) 275 - 295 mOsm/kg    eGFR-Cr 31  (L) >=60 mL/min/1.73m2    Patient Fasting for BMP? Yes      *Note: Due to a large number of results and/or encounters for the requested time period, some results have not been displayed. A complete set of results can be found in Results Review.       Recent Results (from the past 8760 hours)   CBC With Differential With Platelet    Collection Time: 03/11/25 10:50 AM   Result Value Ref Range    WBC 7.0 4.0 - 11.0 x10(3) uL    RBC 4.20 3.80 - 5.30 x10(6)uL    HGB 12.9 12.0 - 16.0 g/dL    HCT 37.7 35.0 - 48.0 %    MCV 89.8 80.0 - 100.0 fL    MCH 30.7 26.0 - 34.0 pg    MCHC 34.2 31.0 - 37.0 g/dL    RDW-SD 43.4 35.1 - 46.3 fL    RDW 13.2 11.0 - 15.0 %    .0 150.0 - 450.0 10(3)uL    Neutrophil Absolute Prelim 5.59 1.50 - 7.70 x10 (3) uL    Neutrophil Absolute 5.59 1.50 - 7.70 x10(3) uL    Lymphocyte Absolute 0.68 (L) 1.00 - 4.00 x10(3) uL    Monocyte Absolute 0.47 0.10 - 1.00 x10(3) uL    Eosinophil Absolute 0.19 0.00 - 0.70 x10(3) uL    Basophil Absolute 0.03 0.00 - 0.20 x10(3) uL    Immature Granulocyte Absolute 0.02 0.00 - 1.00 x10(3) uL    Neutrophil % 80.2 %    Lymphocyte % 9.7 %    Monocyte % 6.7 %    Eosinophil % 2.7 %    Basophil % 0.4 %    Immature Granulocyte % 0.3 %     *Note: Due to a large number of results and/or encounters for the requested time period, some results have not been displayed. A complete set of results can be found in Results Review.       Imaging:  MRI lumbar spine 2/15/2023  Impression   CONCLUSION:       1. Multilevel degenerative changes of the lumbar spine as detailed. Notable levels as follows:       2. L3-L4:  There is an 11 x 9 mm right subarticular zone disc extrusion, which results in moderate to severe right lateral recess stenosis and effacement of the traversing right L4 nerve root; please correlate for right L4 radiculopathy.  Additional mild    spinal canal and mild right neural foraminal stenosis.   3. L5-S1:  Mild left and minor right neural foraminal stenosis.       4.  Right kidney is not identified and likely surgically absent.       5. Lesser incidental findings as above.           MRI pelvis 2/25/2023  FINDINGS:   Diffuse fatty infiltration of the pelvic musculature, symmetric.       No marrow replacing lesion is seen.  There is no fracture.       Partial imaging of degenerative arthritis of the lower lumbar spine.  Sacral Tarlov cysts are noted.       Mild osteoarthritis of both hips and both sacroiliac joints.  Mild degenerative changes of the pubic symphysis.  Bilateral gluteus minimus/medius attachments on the greater trochanter are normal.  Bilateral hamstring attachments are intact.       Fluid-filled/prominent endometrial cavity is noted and partially imaged.                   Impression   CONCLUSION:       1. Degenerative changes of the pelvis.       2. Fluid-filled/prominent endometrial cavity.  This will require further workup with pelvic ultrasound..        Ultrasound pelvis 3/2/2023  Impression   CONCLUSION:  Thickened endometrium with complex heterogeneous appearance with cystic components.  Given patient's postmenopausal state, this is abnormal.  Consider endometrial biopsy to further evaluate.       Holter monitor 10/11/2023  - Frequent PACs 2%  - 62 atrial runs and maximal rate of 138 bpm  - Frequent PVCs 2.5%  - No sustained ventricular arrhythmias     CT chest/abdomen/pelvis 6/3/2024  FINDINGS:     Evaluation of parenchymal organs is limited due to lack of IV contrast.     LINES AND TUBES: None.     MEDIASTINUM/VASCULATURE: There are multiple mildly prominent mediastinal lymph nodes which are nonspecific and measure up to 1 cm in short axis. There is atherosclerotic calcification of the aortic arch and thoracic aorta. The thoracic aorta is otherwise   unremarkable and not dilated. Mediastinal fat planes are preserved. Cardiac chambers are unremarkable. Pericardium is normal. There are coronary artery calcifications. The main pulmonary artery has a normal  diameter and is otherwise unremarkable.     LUNGS AND PLEURA: There is bibasilar and lingular atelectasis and scarring. No pneumothorax.  No consolidation.  No pleural effusion. The trachea and central airways are unremarkable.  1.9 cm area of scarring seen within the medial aspect of the right  lower lobe likely represents compressive atelectasis from the adjacent vertebral body osteophyte and is grossly unchanged since 8/18/2020..     CHEST WALL/BONES: There is degenerative disease of the thoracic spine. The chest wall and osseous structures are otherwise unremarkable.    LIVER: Unremarkable  GALLBLADDER: The patient is post cholecystectomy.  BILIARY: Mild prominence of the common bile duct and intrahepatic ducts, an expected finding post cholecystectomy. No gross intra-or extrahepatic biliary ductal dilation.  SPLEEN: Unremarkable  PANCREAS: No gross ductal dilation.  ADRENALS: Unremarkable  KIDNEYS: Postoperative changes of a right nephrectomy.  No mass or loculated collection within the nephrectomy bed.  3 cm simple left renal cyst.  Mild nonspecific left perinephric stranding.  No left renal calculus or hydronephrosis.  AORTA/VASCULAR:   There is atherosclerotic calcification of the abdominal aorta extending into bilateral iliac arteries.  RETROPERITONEUM: No mass or enlarged adenopathy.    BOWEL: Diverticulosis is seen throughout the entire colon from the cecum to the sigmoid colon without of diverticulitis. The appendix is normal. There are no inflammatory changes within the right lower quadrant.  Small hiatal hernia with wall thickening   at the gastroesophageal junction. Remainder of the bowel is otherwise unremarkable without dilation or wall thickening.  MESENTERY: Nonspecific soft tissue edema within the bilateral upper and lower quadrants. No mass or loculated collection.  PELVIS: Bladder wall thickening likely due to under distention. No mass or fluid collection.  No enlarged lymph nodes.  BONES:    There is degenerative disease of the thoracic and lumbar spine. Degenerative changes are seen within the sacroiliac joints and pubic symphysis. Degenerative changes are seen in the bilateral hips.  Left lateral spot view shin at L3-L4 with  interbody spacer.               Impression   CONCLUSION:     Post right nephrectomy.     No metastatic disease within the unenhanced chest, abdomen or pelvis.     Pandiverticulosis without diverticulitis.        Multiple other incidental findings as described in the body of the report.           Pathology:  Endometrial biopsy 3/9/2023  Final Diagnosis:      Endometrium; biopsy:   Mostly mucus with scanty fragments of endometrial stroma and benign endocervical glands present.  No evidence of hyperplasia or malignancy identified.  See comment.      Skin biopsy 7/17/2024  Final Diagnosis     Skin, distal right thigh, shave biopsy:  -Squamous cell carcinoma in situ.              ASSESSMENT AND PLAN:     Epistaxis  - May be multifactorial with use of aspirin, possibly temperature changes  - Advise Afrin nasal spray as she did have an episode of the epistaxis today  - Can use on as-needed basis during times of bleeding  - May need to hold aspirin until Thursday 8/29  - She will notify us if there is any recurrent episodes may need to hold aspirin for longer duration  - Can see ENT if cautery is needed in the future.  -Thankfully this has resolved since last visit     Paroxysmal atrial fibrillation  Transient A-fib while in PACU postop.  She was otherwise hemodynamically stable  - Zio patch reviewed as above  - Seen by cardiology, Dr. Hyde 4/10/2024, resting heart rate in the 50s for which metoprolol dose was maintained     Right lumbar radiculopathy  MRI lumbar --2/15/23-L3-L4 right disc extrusion, results in mod to severe right lateral recess stenosis and effacement of the traversing right L4 nerve root.  I suspect pain is related to L4 radiculopathy.  Patient has seen Dr. Power  of physiatry.  She wishes another opinion.  Referred to pain management clinic.  Seen 4/11/2023, plans for SI joint injection on 4/18/2023.  If no improvement, may benefit from epidural injection.    -Status post right SI joint injection 4/18/2023 with minimal relief.  Most recently seen by Dr. Nava 7/21/2023.  Refractory to injections, for which she was recommended to consider operative management-spinal cord stimulator trial with Dr. Osiel Green  - Status post L3-L4 interbody fusion with posterior spinal fusion with Dr. Green 9/21/2023.  -Follow-up with Dr. Green neurosurgery  - She is very pleased with the results of her surgery     Thickened endometrium  -EMB  done 3/9/2023 by Dr. Mejia.  -Biopsy results show no evidence of hyperplasia or malignancy     Ischemic cardiomyopathy  Stented coronary artery  Hosp 5/2022 for CHF.  Cardiac cath 5/27/22-Dr. Swenson-stents placed to LAD and diagonal.  An echo 2/28/2023-Dr. Jones-EF 36%, up from 30 to 35% in May 2022.  - Follow-up with Dr. Hyde in 10/9/2024, Entresto  twice daily, Toprol 50 mg twice a day, Jardiance 10 mg daily, isosorbide mononitrate 30 mg daily, hydralazine 50 mg 3 times a day.  Lasix only as needed  - Started on Vascepa 2 g twice a day     CKD (chronic kidney disease), 3B  has solitary kidney.  Sees Dr Salomon.   - Last seen by Dr. Salomon 2/20/2025, , Creatinine 1.64, EGFR 31     Diabetes type 2  levimir 28 u/d (Dr Jones d/c Jardiance 10 mg in Aug 2022-since restarted) , NovoLog via SS.   Metformin d/c 2020 RI after R nephrectomy.  Glimepiride stopped 6/2022 low sugars.   ophtho Dr Dodd.    Recent A1c:   HEMOGLOBIN A1C (%)   Date Value   03/20/2025 7.9 (A)     Recent urine microalbumin: No components found for: \"URINEMICROALBUMIN\"  Current medications: Levemir 28 units/day, Jardiance 10 mg  Eye exam: Due for eye examination - sees Dr. Dodd  Foot exam: Seems to be well-controlled  - A1c has increased from 7.2% to 7.9%  - We will increase  the Jardiance.  She still has 10 mg tablets so we can double the dose starting tomorrow.  When she is out, we will increase to the maximum dose Jardiance 25 mg once a day     Hypertension   -metoprolol ER 50 mg bid, hydralazine 50 mg tid, Imdur 30 mg daily.     amlodipine 5 mg daily. (also on entresto).  Off Enalapril  for incr K.  Off hctz 25 mg  due to renal insuff.  - Continue checking blood pressures at home     Hyperlipidemia, mxd  -Continued on rosuvastatin  -Last lipid panel with triglycerides 194, LDL 43  - Continue to optimize nutrition  - Started on Vascepa for further triglyceride support per Dr. Hyde     Bereavement  -her   of lung cancer 4/3/22. She is doing ok w support system of family, etc. No depression. Declined med or counselor.     S/p R nephrectomy for renal cell carcinoma   -20 Dr Denise-12.0 cm -margins free.  MRI abd and CXR 21-Dr. Denise no recurrence or metastatic disease. Previously followed with Dr. Dutton   -Most recently seen by  6/10/2024, CT scan without recurrence     DJD right knee  - Xray R knee 21--severe DJD.  Dr Rose gave brianna shot 21.     S/p gout right big toe   2020 and 2021, prednisone rx.  uric acid 20 elev 6.8 (ref range 2.6-6.0).  Declined prev med.      Carotid bruits  -bilat. [employer] screening 10/3/17-carotids mild bilat--> mild bilat carotids on [employer] 18.     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 Leal -last excision 2024.  Pathology revealed squamous cell carcinoma in situ  - Most recently seen by dermatology, Dr. Leal 3/18/2025, status post excision left mid upper back for concern of atypical lesion.  Started on 5-FU/calcipotriene for areas of actinic keratosis.  Follow-up again in 3 months     Vitamin D deficiency  Vitamin D level had normalized  - Started on vitamin  D3/cholecalciferol 2000 units once a day               Orders This Visit:  Orders Placed This Encounter   Procedures    CBC With Differential With Platelet    Comp Metabolic Panel (14)    Hemoglobin A1C    Lipid Panel    Vitamin D    TSH W Reflex To Free T4    POC Glycohemoglobin [36753]    Mumps Antibodies, IGG-Immunity [E]    Rubeola(Measles)Antibodies, IGG-Immunity [E]    Rubella, IGG       Meds This Visit:  Requested Prescriptions      No prescriptions requested or ordered in this encounter       Imaging & Referrals:  None     Health Maintenance  May be due for Colonoscopy  Due for COVID Dose #7    Spent 30 minutes obtaining history, evaluating patient, discussing treatment options, diet, exercise, review of available labs and radiology reports, and completing documentation.       Return to clinic in 3-4 months for diabetic follow-up    Artie Guerra MD, 03/20/25, 4:23 PM

## 2025-03-19 NOTE — PATIENT INSTRUCTIONS
- You were seen in clinic for regular annual check-up.  I did review her most recent set of labs    Diabetes can be better controlled with a goal less than 7.0.  A1c has increased from 7.2% to 7.9%  - Please continue trying to cut down on carbohydrates not just in sweets but also in bread/grain/rice/breads  - Targeting weight loss over time can help by optimizing nutrition, targeting exercise as tolerated  - Maintain annual eye examination  - Lets increase the Jardiance from 10 mg to 20 mg.  Okay to take 2 tablets starting tomorrow.  Let us know when you are getting low as the next prescription should be Jardiance 25 mg once a day \    Cholesterol remains well-controlled and you have improved your sugar levels tremendously.  Keep up the good work with nutrition optimization and exercise as able  - The Vascepa should help with triglycerides    Kidney status remains stable at this time.  Will continue following with Dr. Salomon  -Continue with good water intake    Notify us if the bloody noses continue, as we may need to hold the aspirin sometimes 2-3 days  - Please make an appointment with ENT with Dr. Alexander if there is still no improvement    -There is been no recurrence of renal cell carcinoma, follow-up with surveillance CT scans with Dr. Mireles    - Please make an appointment with ophthalmology for usual eye examinations  -Please continue checking your blood pressures at home and notify us if they are increasing   - please continue checking your blood sugars at home and notify us if they are increasing  - Continue following dermatology for usual skin checks    We may need further intervention with the right wrist with Dr. Leal  Surgical excision may be necessary    - Cardiac status remains stable, management to be continued with Dr. Hyde  -Vaccines you may be due for: COVID dose #7, We recommended checking with your insurance for coverage of Shingrix, a 2-dose shingles vaccine that can be obtained at the  pharmacy if there is adequate coverage through your insurance plan.    Return to clinic in 3 months for diabetic follow-up.  We did place fasting blood work to be completed around that time.  This will also include measles/mumps/rubella titers to determine if a booster is needed

## 2025-03-20 ENCOUNTER — OFFICE VISIT (OUTPATIENT)
Dept: INTERNAL MEDICINE CLINIC | Facility: CLINIC | Age: 80
End: 2025-03-20
Payer: MEDICARE

## 2025-03-20 VITALS
TEMPERATURE: 97 F | BODY MASS INDEX: 28.89 KG/M2 | WEIGHT: 161 LBS | HEART RATE: 68 BPM | DIASTOLIC BLOOD PRESSURE: 64 MMHG | HEIGHT: 62.75 IN | SYSTOLIC BLOOD PRESSURE: 112 MMHG

## 2025-03-20 DIAGNOSIS — E11.9 TYPE 2 DIABETES MELLITUS WITHOUT COMPLICATION, WITHOUT LONG-TERM CURRENT USE OF INSULIN (HCC): ICD-10-CM

## 2025-03-20 DIAGNOSIS — Z01.84 IMMUNITY TO MEASLES, MUMPS, AND RUBELLA DETERMINED BY SEROLOGIC TEST: ICD-10-CM

## 2025-03-20 DIAGNOSIS — Z90.5 S/P NEPHRECTOMY: ICD-10-CM

## 2025-03-20 DIAGNOSIS — N18.4 CKD (CHRONIC KIDNEY DISEASE), STAGE IV (HCC): ICD-10-CM

## 2025-03-20 DIAGNOSIS — I10 ESSENTIAL HYPERTENSION: ICD-10-CM

## 2025-03-20 DIAGNOSIS — Z13.220 SCREENING FOR LIPOID DISORDERS: ICD-10-CM

## 2025-03-20 DIAGNOSIS — Z13.29 SCREENING FOR THYROID DISORDER: ICD-10-CM

## 2025-03-20 DIAGNOSIS — Z13.0 SCREENING FOR DEFICIENCY ANEMIA: ICD-10-CM

## 2025-03-20 DIAGNOSIS — I25.5 ISCHEMIC CARDIOMYOPATHY: Primary | ICD-10-CM

## 2025-03-20 DIAGNOSIS — E78.00 PURE HYPERCHOLESTEROLEMIA: ICD-10-CM

## 2025-03-20 DIAGNOSIS — E78.2 HYPERLIPIDEMIA, MIXED: ICD-10-CM

## 2025-03-20 DIAGNOSIS — E55.9 VITAMIN D DEFICIENCY: ICD-10-CM

## 2025-03-20 DIAGNOSIS — M54.50 CHRONIC MIDLINE LOW BACK PAIN WITHOUT SCIATICA: ICD-10-CM

## 2025-03-20 DIAGNOSIS — G89.29 CHRONIC MIDLINE LOW BACK PAIN WITHOUT SCIATICA: ICD-10-CM

## 2025-03-20 LAB — HEMOGLOBIN A1C: 7.9 % (ref 4.3–5.6)

## 2025-03-20 PROCEDURE — 99214 OFFICE O/P EST MOD 30 MIN: CPT | Performed by: INTERNAL MEDICINE

## 2025-03-20 PROCEDURE — 83036 HEMOGLOBIN GLYCOSYLATED A1C: CPT | Performed by: INTERNAL MEDICINE

## 2025-04-02 ENCOUNTER — TELEPHONE (OUTPATIENT)
Dept: INTERNAL MEDICINE CLINIC | Facility: CLINIC | Age: 80
End: 2025-04-02

## 2025-04-02 NOTE — TELEPHONE ENCOUNTER
[facility] facing alternative request for Insulin Aspart 100 Unit   Prescribed medication is not covered by insurance   Fax in green folder with suggested alternatives

## 2025-04-02 NOTE — TELEPHONE ENCOUNTER
Instead of doubling medication (discussed at last OV) patient would rather go back on the  short-acting insulin that she was on before     Requests call back to advise  [phone]

## 2025-04-02 NOTE — TELEPHONE ENCOUNTER
Please refer patient that I have reordered her aspart short acting insulin.  Lets confirm which pharmacy we should send it to.    We can resume her prior dosing.  May need to adjust based on sliding scale, up to 1-5 units with each meal.

## 2025-04-03 NOTE — TELEPHONE ENCOUNTER
Faxed alternative request to [facility] [phone].  Received fax confirmation.

## 2025-04-03 NOTE — TELEPHONE ENCOUNTER
Message to be faxed to pharmacy;  [facility]

## 2025-04-22 RX ORDER — BLOOD-GLUCOSE METER
1 KIT MISCELLANEOUS 3 TIMES DAILY
Qty: 300 STRIP | Refills: 3 | Status: SHIPPED | OUTPATIENT
Start: 2025-04-22

## 2025-04-22 NOTE — TELEPHONE ENCOUNTER
Refill request is for a maintenance medication and has met the criteria specified in the Ambulatory Medication Refill Standing Order for eligibility, visits, laboratory, alerts and was sent to the requested pharmacy.    Requested Prescriptions     Signed Prescriptions Disp Refills    FREESTYLE LITE TEST In Vitro Strip 300 strip 3     Sig: USE TO TEST 3 TIMES A DAY     Authorizing Provider: MARY ARREOLA     Ordering User: CRISTHIAN HERNANDEZ

## 2025-05-09 RX ORDER — ISOSORBIDE MONONITRATE 30 MG/1
30 TABLET, EXTENDED RELEASE ORAL DAILY
Qty: 90 TABLET | Refills: 3 | Status: SHIPPED | OUTPATIENT
Start: 2025-05-09

## 2025-05-09 NOTE — TELEPHONE ENCOUNTER
Per MD note 3/25:  \"- Lets increase the Jardiance from 10 mg to 20 mg. Okay to take 2 tablets starting tomorrow. Let us know when you are getting low as the next prescription should be Jardiance 25 mg once a day \"    Refill request is for a maintenance medication and has met the criteria specified in the Ambulatory Medication Refill Standing Order for eligibility, visits, laboratory, alerts and was sent to the requested pharmacy.    Requested Prescriptions     Signed Prescriptions Disp Refills    empagliflozin (JARDIANCE) 25 MG Oral Tab 90 tablet 3     Sig: Take 1 tablet (25 mg total) by mouth daily.     Authorizing Provider: MARY ARREOLA     Ordering User: RUFUS BATISTA    ISOSORBIDE MONONITRATE ER 30 MG Oral Tablet 24 Hr 90 tablet 3     Sig: TAKE 1 TABLET DAILY     Authorizing Provider: MARY ARREOLA     Ordering User: RUFUS BATISTA

## 2025-05-27 ENCOUNTER — LAB ENCOUNTER (OUTPATIENT)
Dept: LAB | Age: 80
End: 2025-05-27
Attending: INTERNAL MEDICINE
Payer: MEDICARE

## 2025-05-27 DIAGNOSIS — Z01.84 IMMUNITY TO MEASLES, MUMPS, AND RUBELLA DETERMINED BY SEROLOGIC TEST: ICD-10-CM

## 2025-05-27 DIAGNOSIS — Z13.0 SCREENING FOR DEFICIENCY ANEMIA: ICD-10-CM

## 2025-05-27 DIAGNOSIS — E11.9 TYPE 2 DIABETES MELLITUS WITHOUT COMPLICATION, WITHOUT LONG-TERM CURRENT USE OF INSULIN (HCC): ICD-10-CM

## 2025-05-27 DIAGNOSIS — Z13.29 SCREENING FOR THYROID DISORDER: ICD-10-CM

## 2025-05-27 DIAGNOSIS — E78.2 HYPERLIPIDEMIA, MIXED: ICD-10-CM

## 2025-05-27 DIAGNOSIS — E55.9 VITAMIN D DEFICIENCY: ICD-10-CM

## 2025-05-27 LAB
ALBUMIN SERPL-MCNC: 4.4 G/DL (ref 3.2–4.8)
ALBUMIN/GLOB SERPL: 2.2 {RATIO} (ref 1–2)
ALP LIVER SERPL-CCNC: 77 U/L (ref 55–142)
ALT SERPL-CCNC: 12 U/L (ref 10–49)
ANION GAP SERPL CALC-SCNC: 9 MMOL/L (ref 0–18)
AST SERPL-CCNC: 17 U/L (ref ?–34)
BASOPHILS # BLD AUTO: 0.04 X10(3) UL (ref 0–0.2)
BASOPHILS NFR BLD AUTO: 0.5 %
BILIRUB SERPL-MCNC: 0.5 MG/DL (ref 0.2–1.1)
BUN BLD-MCNC: 32 MG/DL (ref 9–23)
BUN/CREAT SERPL: 18.4 (ref 10–20)
CALCIUM BLD-MCNC: 8.9 MG/DL (ref 8.7–10.4)
CHLORIDE SERPL-SCNC: 109 MMOL/L (ref 98–112)
CHOLEST SERPL-MCNC: 107 MG/DL (ref ?–200)
CO2 SERPL-SCNC: 22 MMOL/L (ref 21–32)
CREAT BLD-MCNC: 1.74 MG/DL (ref 0.55–1.02)
DEPRECATED RDW RBC AUTO: 43.5 FL (ref 35.1–46.3)
EGFRCR SERPLBLD CKD-EPI 2021: 29 ML/MIN/1.73M2 (ref 60–?)
EOSINOPHIL # BLD AUTO: 0.25 X10(3) UL (ref 0–0.7)
EOSINOPHIL NFR BLD AUTO: 3.2 %
ERYTHROCYTE [DISTWIDTH] IN BLOOD BY AUTOMATED COUNT: 12.9 % (ref 11–15)
EST. AVERAGE GLUCOSE BLD GHB EST-MCNC: 174 MG/DL (ref 68–126)
FASTING PATIENT LIPID ANSWER: YES
FASTING STATUS PATIENT QL REPORTED: YES
GLOBULIN PLAS-MCNC: 2 G/DL (ref 2–3.5)
GLUCOSE BLD-MCNC: 163 MG/DL (ref 70–99)
HBA1C MFR BLD: 7.7 % (ref ?–5.7)
HCT VFR BLD AUTO: 40.9 % (ref 35–48)
HDLC SERPL-MCNC: 40 MG/DL (ref 40–59)
HGB BLD-MCNC: 13.3 G/DL (ref 12–16)
IMM GRANULOCYTES # BLD AUTO: 0.02 X10(3) UL (ref 0–1)
IMM GRANULOCYTES NFR BLD: 0.3 %
LDLC SERPL CALC-MCNC: 40 MG/DL (ref ?–100)
LYMPHOCYTES # BLD AUTO: 0.76 X10(3) UL (ref 1–4)
LYMPHOCYTES NFR BLD AUTO: 9.6 %
MCH RBC QN AUTO: 30 PG (ref 26–34)
MCHC RBC AUTO-ENTMCNC: 32.5 G/DL (ref 31–37)
MCV RBC AUTO: 92.1 FL (ref 80–100)
MONOCYTES # BLD AUTO: 0.54 X10(3) UL (ref 0.1–1)
MONOCYTES NFR BLD AUTO: 6.9 %
NEUTROPHILS # BLD AUTO: 6.27 X10 (3) UL (ref 1.5–7.7)
NEUTROPHILS # BLD AUTO: 6.27 X10(3) UL (ref 1.5–7.7)
NEUTROPHILS NFR BLD AUTO: 79.5 %
NONHDLC SERPL-MCNC: 67 MG/DL (ref ?–130)
OSMOLALITY SERPL CALC.SUM OF ELEC: 300 MOSM/KG (ref 275–295)
PLATELET # BLD AUTO: 206 10(3)UL (ref 150–450)
POTASSIUM SERPL-SCNC: 4.8 MMOL/L (ref 3.5–5.1)
PROT SERPL-MCNC: 6.4 G/DL (ref 5.7–8.2)
RBC # BLD AUTO: 4.44 X10(6)UL (ref 3.8–5.3)
RUBV IGG SER QL: POSITIVE
RUBV IGG SER-ACNC: >500 IU/ML (ref 10–?)
SODIUM SERPL-SCNC: 140 MMOL/L (ref 136–145)
TRIGL SERPL-MCNC: 159 MG/DL (ref 30–149)
TSI SER-ACNC: 2.85 UIU/ML (ref 0.55–4.78)
VIT D+METAB SERPL-MCNC: 33.4 NG/ML (ref 30–100)
VLDLC SERPL CALC-MCNC: 22 MG/DL (ref 0–30)
WBC # BLD AUTO: 7.9 X10(3) UL (ref 4–11)

## 2025-05-27 PROCEDURE — 86765 RUBEOLA ANTIBODY: CPT

## 2025-05-27 PROCEDURE — 83036 HEMOGLOBIN GLYCOSYLATED A1C: CPT

## 2025-05-27 PROCEDURE — 80053 COMPREHEN METABOLIC PANEL: CPT

## 2025-05-27 PROCEDURE — 80061 LIPID PANEL: CPT

## 2025-05-27 PROCEDURE — 84443 ASSAY THYROID STIM HORMONE: CPT

## 2025-05-27 PROCEDURE — 86735 MUMPS ANTIBODY: CPT

## 2025-05-27 PROCEDURE — 85025 COMPLETE CBC W/AUTO DIFF WBC: CPT

## 2025-05-27 PROCEDURE — 86762 RUBELLA ANTIBODY: CPT

## 2025-05-27 PROCEDURE — 82306 VITAMIN D 25 HYDROXY: CPT

## 2025-05-27 PROCEDURE — 36415 COLL VENOUS BLD VENIPUNCTURE: CPT

## 2025-05-28 LAB
MEV IGG SER-ACNC: 223 AU/ML (ref 16.5–?)
MUV IGG SER IA-ACNC: >300 AU/ML (ref 11–?)

## 2025-06-01 NOTE — PROGRESS NOTES
Chief Complaint:   No chief complaint on file.        HPI:     Ms. FLOYD is a 79 year old female PMHX ischemic cardiomyopathy, CKD stage IV, type 2 diabetes, hypertension, hyperlipidemia, history of renal cell carcinoma status post right nephrectomy, degenerative joint disease coming in for follow-up    No breakfast  Lunch - Meat, veggies,  Dinner - Salad  Bread with sandwiches,   Water and lemonade    Exercise limited by the knees.    No further nose bleeds.    Sugar at home, 130-200 - AM -much lower in the evening and later in the day.  No hypoglycemic events    No active pain at this time, no chest pain or shortness of breath.  Having good bowel movements.    Past Medical History:    Adenomatous colon polyp    colonoscopy 3/13 Dr. Martínez-also diverticulosis and int hem.    Allergic conjunctivitis    Dr. Dodd    Back problem    CAD (coronary artery disease)    Cardiac cath 5/27/22-Dr. Swenson-stents placed to LAD and diagonal.     Cancer (HCC)    Congestive heart disease (HCC)    Diabetes (HCC)    Diabetes mellitus (HCC)    Diverticulosis    Essential hypertension    Glaucoma    Gout    Hyperlipidemia    Hypertension, essential    Internal hemorrhoids    Ischemic cardiomyopathy    LBBB (left bundle branch block)    nl stress echo 6/14-Dr. Jones    Osteopenia    f/u dexa nl in 9/14    Renal cell carcinoma (HCC)     robotic asissted R radical nephrectomy 9/2/20 Dr Denise.    Type 2 diabetes mellitus (HCC)     Past Surgical History:   Procedure Laterality Date    Back surgery  [id number]    Cataract  1998    Cataract extraction Bilateral 2014    Dr. Dodd    Cholecystectomy  1981    at [facility]    Colonoscopy N/A 03/09/2018    Procedure: COLONOSCOPY, POSSIBLE BIOPSY, POSSIBLE POLYPECTOMY 08654;  Surgeon: Jersey Martínez MD;  Location: [facility] [facility] localization wire 1 site left (cpt=[id number])  1998    Nephrectomy Right 09/02/2020     robotic asissted R radical nephrectomy 9/2/20 Dr Denise.       1968     Social History:  Social History     Socioeconomic History    Marital status:    Tobacco Use    Smoking status: Former     Current packs/day: 0.00     Average packs/day: 1 pack/day for 20.0 years (20.0 ttl pk-yrs)     Types: Cigarettes     Start date: 1973     Quit date: 1993     Years since quittin.7     Passive exposure: Never    Smokeless tobacco: Never   Vaping Use    Vaping status: Never Used   Substance and Sexual Activity    Alcohol use: No    Drug use: No   Other Topics Concern    Caffeine Concern No    Exercise No   Social History Narrative    The patient uses the following assistive device(s):  rolling walker.      The patient does live in a home with stairs.     Family History:  Family History   Problem Relation Age of Onset    Stroke Father          age 80    Other (unknown cause of death) Mother          age 83    Diabetes Mother     Hypertension Brother     Other (1 brother) Other         living and well    Other (1 son, 1 daughter) Other     Breast Cancer Neg      Allergies:  Allergies   Allergen Reactions    Bactrim [Sulfamethoxazole W/Trimethoprim] NAUSEA AND VOMITING     Nausea and vomiting in 2018    Atorvastatin      Other reaction(s): ears ring     Current Meds:  Current Outpatient Medications   Medication Sig Dispense Refill    empagliflozin (JARDIANCE) 25 MG Oral Tab Take 1 tablet (25 mg total) by mouth daily. 90 tablet 3    ISOSORBIDE MONONITRATE ER 30 MG Oral Tablet 24 Hr TAKE 1 TABLET DAILY 90 tablet 3    FREESTYLE LITE TEST In Vitro Strip USE TO TEST 3 TIMES A  strip 3    insulin aspart 100 Units/mL Subcutaneous Solution Pen-injector Take 1-5 U with each meal 15 mL 3    insulin glargine (BASAGLAR KWIKPEN) 100 UNIT/ML Subcutaneous Solution Pen-injector Inject 28 Units into the skin every morning. 30 mL 3    AMLODIPINE 5 MG Oral Tab TAKE 1 TABLET DAILY 90 tablet 3    oxymetazoline 0.05 % Nasal Solution 1 spray by Nasal route daily as  needed for congestion (Nosebleed). 15 mL 0    HYDRALAZINE 50 MG Oral Tab TAKE 1 TABLET 3 TIMES A  tablet 3    furosemide 20 MG Oral Tab Take 1 tablet (20 mg total) by mouth daily. 5/4 restarted 90 tablet 3    rosuvastatin 20 MG Oral Tab Take 1 tablet (20 mg total) by mouth nightly. 90 tablet 0    sacubitril-valsartan  MG Oral Tab Take 1 tablet by mouth 2 (two) times daily.      Insulin Pen Needle (BD PEN NEEDLE MINI U/F) 31G X 5 MM Does not apply Misc Use three times daily with insulin. 300 each 3    metoprolol succinate ER 50 MG Oral Tablet 24 Hr Take 1 tablet (50 mg total) by mouth 2 (two) times a day. 180 tablet 3    aspirin 81 MG Oral Tab EC Take 1 tablet (81 mg total) by mouth daily.  0    LUMIGAN 0.01 % Ophthalmic Solution Place 1 drop into both eyes daily.        Counseling given: Not Answered       REVIEW OF SYSTEMS:   Positive Findings indicated in BOLD    Constitutional: Fever, Chills, Weight Gain, Weight Loss, Night Sweats, Fatigue, Malaise  ENT/Mouth:  Hearing Changes, Ear Pain, Nasal Congestion, Sinus Pain, Hoarseness, Sore throat, Rhinorrhea, Swallowing Difficulty  Eyes: Eye Pain, Swelling, Redness, Foreign Body, Discharge, Vision Changes  Cardiovascular: Chest Pain, SOB, PND, Dyspnea on Exertion, Orthopnea, Claudication, Edema, Palpitations  Respiratory: Cough, Sputum, Wheezing, Shortness of breath  Gastrointestinal: Nausea, Vomiting, Diarrhea, Constipation, Pain, Heartburn, Dysphagia, Bloody stools, Tarry stools  Genitourinary: Dysmenorrhea, Dysuria, Urinary Frequency, Hematuria, Urinary Incontinence, Urgency,  Flank Pain  Musculoskeletal: Arthralgias, Myalgias, Joint Swelling, Joint Stiffness, Back Pain, Neck Pain  Integumentary: Skin Lesions, Pruritis, Hair Changes, Jaundice, Nail changes  Neuro: Weakness, Numbness, Paresthesias, Loss of Consciousness, Syncope, Dizziness, Headache, Falls  Psych: Anxiety, Depression, Insomnia, Suicidal Ideation, Homicidal ideation, Memory  Changes  Heme/Lymph: Bruising, Bleeding, Lymphadenopathy  Endocrine: Polyuria, Polydipsia, Temperature Intolerance    EXAM:   Vital Signs:  not currently breastfeeding.     Constitutional: No acute distress. Alert and oriented x 3. Examined in wheelchair  Eyes: EOMI, PERRLA, clear sclera b/l  HENT: NCAT, Moist mucous membranes, Oropharynx without erythema or exudates  Cardiovascular: S1, S2, no S3, no S4, Regular rate and rhythm, No murmurs/gallops/rubs.   Respiratory: Clear to auscultation bilaterally.  No wheezes/rales/rhonchi  Gastrointestinal: Soft, nontender, nondistended. Positive bowel sounds x 4.   Genitourinary: No CVA tenderness bilaterally  Neurologic: No focal neurological deficits, CN II-XII intact, light touch intact, MSK Strength 5/5 BLE  Musculoskeletal: Full range of motion of all extremities  Skin: No lesions, No erythema, no jaundice, Cap Refill < 2s  Psychiatric: Appropriate mood and affect  Heme/Lymph/Immune: No cervical LAD        DATA REVIEWED   Labs:  Recent Results (from the past 8760 hours)   Comp Metabolic Panel (14)    Collection Time: 05/27/25 10:08 AM   Result Value Ref Range    Glucose 163 (H) 70 - 99 mg/dL    Sodium 140 136 - 145 mmol/L    Potassium 4.8 3.5 - 5.1 mmol/L    Chloride 109 98 - 112 mmol/L    CO2 22.0 21.0 - 32.0 mmol/L    Anion Gap 9 0 - 18 mmol/L    BUN 32 (H) 9 - 23 mg/dL    Creatinine 1.74 (H) 0.55 - 1.02 mg/dL    BUN/CREA Ratio 18.4 10.0 - 20.0    Calcium, Total 8.9 8.7 - 10.4 mg/dL    Calculated Osmolality 300 (H) 275 - 295 mOsm/kg    eGFR-Cr 29 (L) >=60 mL/min/1.73m2     Comment: eGFR calculated using the CKD-EPI 2021 calculation    ALT 12 10 - 49 U/L    AST 17 <34 U/L    Alkaline Phosphatase 77 55 - 142 U/L    Bilirubin, Total 0.5 0.2 - 1.1 mg/dL    Total Protein 6.4 5.7 - 8.2 g/dL    Albumin 4.4 3.2 - 4.8 g/dL    Globulin  2.0 2.0 - 3.5 g/dL    A/G Ratio 2.2 (H) 1.0 - 2.0    Patient Fasting for CMP? Yes      *Note: Due to a large number of results and/or  encounters for the requested time period, some results have not been displayed. A complete set of results can be found in Results Review.       Recent Results (from the past 8760 hours)   CBC With Differential With Platelet    Collection Time: 05/27/25 10:08 AM   Result Value Ref Range    WBC 7.9 4.0 - 11.0 x10(3) uL    RBC 4.44 3.80 - 5.30 x10(6)uL    HGB 13.3 12.0 - 16.0 g/dL    HCT 40.9 35.0 - 48.0 %    MCV 92.1 80.0 - 100.0 fL    MCH 30.0 26.0 - 34.0 pg    MCHC 32.5 31.0 - 37.0 g/dL    RDW-SD 43.5 35.1 - 46.3 fL    RDW 12.9 11.0 - 15.0 %    .0 150.0 - 450.0 10(3)uL    Neutrophil Absolute Prelim 6.27 1.50 - 7.70 x10 (3) uL    Neutrophil Absolute 6.27 1.50 - 7.70 x10(3) uL    Lymphocyte Absolute 0.76 (L) 1.00 - 4.00 x10(3) uL    Monocyte Absolute 0.54 0.10 - 1.00 x10(3) uL    Eosinophil Absolute 0.25 0.00 - 0.70 x10(3) uL    Basophil Absolute 0.04 0.00 - 0.20 x10(3) uL    Immature Granulocyte Absolute 0.02 0.00 - 1.00 x10(3) uL    Neutrophil % 79.5 %    Lymphocyte % 9.6 %    Monocyte % 6.9 %    Eosinophil % 3.2 %    Basophil % 0.5 %    Immature Granulocyte % 0.3 %     *Note: Due to a large number of results and/or encounters for the requested time period, some results have not been displayed. A complete set of results can be found in Results Review.       Imaging:  MRI lumbar spine 2/15/2023  Impression   CONCLUSION:       1. Multilevel degenerative changes of the lumbar spine as detailed. Notable levels as follows:       2. L3-L4:  There is an 11 x 9 mm right subarticular zone disc extrusion, which results in moderate to severe right lateral recess stenosis and effacement of the traversing right L4 nerve root; please correlate for right L4 radiculopathy.  Additional mild    spinal canal and mild right neural foraminal stenosis.   3. L5-S1:  Mild left and minor right neural foraminal stenosis.       4. Right kidney is not identified and likely surgically absent.       5. Lesser incidental findings as above.            MRI pelvis 2/25/2023  FINDINGS:   Diffuse fatty infiltration of the pelvic musculature, symmetric.       No marrow replacing lesion is seen.  There is no fracture.       Partial imaging of degenerative arthritis of the lower lumbar spine.  Sacral Tarlov cysts are noted.       Mild osteoarthritis of both hips and both sacroiliac joints.  Mild degenerative changes of the pubic symphysis.  Bilateral gluteus minimus/medius attachments on the greater trochanter are normal.  Bilateral hamstring attachments are intact.       Fluid-filled/prominent endometrial cavity is noted and partially imaged.                   Impression   CONCLUSION:       1. Degenerative changes of the pelvis.       2. Fluid-filled/prominent endometrial cavity.  This will require further workup with pelvic ultrasound..        Ultrasound pelvis 3/2/2023  Impression   CONCLUSION:  Thickened endometrium with complex heterogeneous appearance with cystic components.  Given patient's postmenopausal state, this is abnormal.  Consider endometrial biopsy to further evaluate.       Holter monitor 10/11/2023  - Frequent PACs 2%  - 62 atrial runs and maximal rate of 138 bpm  - Frequent PVCs 2.5%  - No sustained ventricular arrhythmias     CT chest/abdomen/pelvis 6/3/2024  FINDINGS:     Evaluation of parenchymal organs is limited due to lack of IV contrast.     LINES AND TUBES: None.     MEDIASTINUM/VASCULATURE: There are multiple mildly prominent mediastinal lymph nodes which are nonspecific and measure up to 1 cm in short axis. There is atherosclerotic calcification of the aortic arch and thoracic aorta. The thoracic aorta is otherwise   unremarkable and not dilated. Mediastinal fat planes are preserved. Cardiac chambers are unremarkable. Pericardium is normal. There are coronary artery calcifications. The main pulmonary artery has a normal diameter and is otherwise unremarkable.     LUNGS AND PLEURA: There is bibasilar and lingular atelectasis and  scarring. No pneumothorax.  No consolidation.  No pleural effusion. The trachea and central airways are unremarkable.  1.9 cm area of scarring seen within the medial aspect of the right  lower lobe likely represents compressive atelectasis from the adjacent vertebral body osteophyte and is grossly unchanged since 8/18/2020..     CHEST WALL/BONES: There is degenerative disease of the thoracic spine. The chest wall and osseous structures are otherwise unremarkable.    LIVER: Unremarkable  GALLBLADDER: The patient is post cholecystectomy.  BILIARY: Mild prominence of the common bile duct and intrahepatic ducts, an expected finding post cholecystectomy. No gross intra-or extrahepatic biliary ductal dilation.  SPLEEN: Unremarkable  PANCREAS: No gross ductal dilation.  ADRENALS: Unremarkable  KIDNEYS: Postoperative changes of a right nephrectomy.  No mass or loculated collection within the nephrectomy bed.  3 cm simple left renal cyst.  Mild nonspecific left perinephric stranding.  No left renal calculus or hydronephrosis.  AORTA/VASCULAR:   There is atherosclerotic calcification of the abdominal aorta extending into bilateral iliac arteries.  RETROPERITONEUM: No mass or enlarged adenopathy.    BOWEL: Diverticulosis is seen throughout the entire colon from the cecum to the sigmoid colon without of diverticulitis. The appendix is normal. There are no inflammatory changes within the right lower quadrant.  Small hiatal hernia with wall thickening   at the gastroesophageal junction. Remainder of the bowel is otherwise unremarkable without dilation or wall thickening.  MESENTERY: Nonspecific soft tissue edema within the bilateral upper and lower quadrants. No mass or loculated collection.  PELVIS: Bladder wall thickening likely due to under distention. No mass or fluid collection.  No enlarged lymph nodes.  BONES:   There is degenerative disease of the thoracic and lumbar spine. Degenerative changes are seen within the  sacroiliac joints and pubic symphysis. Degenerative changes are seen in the bilateral hips.  Left lateral spot view shin at L3-L4 with  interbody spacer.               Impression   CONCLUSION:     Post right nephrectomy.     No metastatic disease within the unenhanced chest, abdomen or pelvis.     Pandiverticulosis without diverticulitis.        Multiple other incidental findings as described in the body of the report.           Pathology:  Endometrial biopsy 3/9/2023  Final Diagnosis:      Endometrium; biopsy:   Mostly mucus with scanty fragments of endometrial stroma and benign endocervical glands present.  No evidence of hyperplasia or malignancy identified.  See comment.      Skin biopsy 7/17/2024  Final Diagnosis     Skin, distal right thigh, shave biopsy:  -Squamous cell carcinoma in situ.              ASSESSMENT AND PLAN:        Paroxysmal atrial fibrillation  Transient A-fib while in PACU postop.  She was otherwise hemodynamically stable  - Zio patch reviewed as above  - Seen by cardiology, Dr. Hyde 4/10/2024, resting heart rate in the 50s for which metoprolol dose was maintained     Right lumbar radiculopathy  MRI lumbar --2/15/23-L3-L4 right disc extrusion, results in mod to severe right lateral recess stenosis and effacement of the traversing right L4 nerve root.  I suspect pain is related to L4 radiculopathy.  Patient has seen Dr. Power of physiatry.  She wishes another opinion.  Referred to pain management clinic.  Seen 4/11/2023, plans for SI joint injection on 4/18/2023.  If no improvement, may benefit from epidural injection.    -Status post right SI joint injection 4/18/2023 with minimal relief.  Most recently seen by Dr. Nava 7/21/2023.  Refractory to injections, for which she was recommended to consider operative management-spinal cord stimulator trial with Dr. Osiel Green  - Status post L3-L4 interbody fusion with posterior spinal fusion with Dr. Green 9/21/2023.  -Follow-up with   [facility]  - She is very pleased with the results of her surgery     Thickened endometrium  -EMB  done 3/9/2023 by Dr. Mejia.  -Biopsy results show no evidence of hyperplasia or malignancy     Ischemic cardiomyopathy  Stented coronary artery  Hosp 5/2022 for CHF.  Cardiac cath 5/27/22-Dr. Swenson-stents placed to LAD and diagonal.  An echo 2/28/2023-Dr. Jones-EF 36%, up from 30 to 35% in May 2022.  - Most recent echo with EF recovery 35% to 40%  - Follow-up with Dr. Hyde in 10/9/2024, Entresto  twice daily, Toprol 50 mg twice a day, Jardiance 10 mg daily, isosorbide mononitrate 30 mg daily, hydralazine 50 mg 3 times a day.  Lasix only as needed     CKD (chronic kidney disease), 3B  has solitary kidney.  Sees Dr Salomon.   - Last seen by Dr. Salomon 2/20/2025, , Creatinine 1. 7 4, EGFR 29     Diabetes type 2  levimir 28 u/d (Dr Jones d/c Jardiance 10 mg in Aug 2022-since restarted) , NovoLog via SS.   Metformin d/c 2020 RI after R nephrectomy.  Glimepiride stopped 6/2022 low sugars.   ophtho Dr Dodd.    Recent A1c:   HEMOGLOBIN A1C (%)   Date Value   03/20/2025 7.9 (A)     HgbA1C (%)   Date Value   05/27/2025 7.7 (H)     Recent urine microalbumin: No components found for: \"URINEMICROALBUMIN\"  Current medications: Levemir 28 units/day, Jardiance 25 mg  Eye exam: Due for eye examination - sees Dr. Dodd  Foot exam: Seems to be well-controlled  - A1c modest improvement 7.7%  - Continued on Jardiance 25 mg daily  - On the last visit, we did increased Levemir 32 units once a day.  Monitor for any episodes of hypoglycemia  - We will try to keep working on lowering down carbohydrates on the same medications in place.     Hypertension   -metoprolol ER 50 mg bid, hydralazine 50 mg tid, Imdur 30 mg daily.     amlodipine 5 mg daily. (also on entresto).  Off Enalapril 2021 for incr K.  Off hctz 25 mg 2020 due to renal insuff.  - Continue checking blood pressures at home     Hyperlipidemia, mxd  -Continued on  rosuvastatin  -Last lipid panel with triglycerides 159  - Continue to optimize nutrition  - Continued on Vascepa for further triglyceride support per Dr. Hyde.  Overall improved     Bereavement  -her   of lung cancer 4/3/22. She is doing ok w support system of family, etc. No depression. Declined med or counselor.     S/p R nephrectomy for renal cell carcinoma   -20 Dr Denise-12.0 cm -margins free.  MRI abd and CXR 21-Dr. Denise no recurrence or metastatic disease. Previously followed with Dr. Dutton   -Most recently seen by  6/10/2024, CT scan without recurrence     DJD right knee  - Xray R knee 21--severe DJD.  Dr Rose gave brianna shot 21.     S/p gout right big toe   2020 and 2021, prednisone rx.  uric acid 20 elev 6.8 (ref range 2.6-6.0).  Declined prev med.      Carotid bruits  -bilat. [employer] screening 10/3/17-carotids mild bilat--> mild bilat carotids on [employer] 18.     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 Leal -last excision 2024.  Pathology revealed squamous cell carcinoma in situ  - Most recently seen by dermatology, Dr. Leal 2025 status post skin excision right wrist  Inner reparative changes from prior operative site.      .  Pathology was consistent with  Vitamin D deficiency  Vitamin D level had normalized  - Started on vitamin D3/cholecalciferol 2000 units once a day               Orders This Visit:  No orders of the defined types were placed in this encounter.      Meds This Visit:  Requested Prescriptions      No prescriptions requested or ordered in this encounter       Imaging & Referrals:  None     Health Maintenance  May be due for Colonoscopy  Due for COVID Dose #7    Spent 30 minutes obtaining history, evaluating patient, discussing treatment options, diet, exercise, review of available labs and radiology  reports, and completing documentation.       Return to clinic in 3-4 months for Medicare annual physical exam    Artie Guerra MD, 06/03/25, 11:14 PM

## 2025-06-01 NOTE — PATIENT INSTRUCTIONS
- You were seen in clinic for regular annual check-up.  I did review her most recent set of labs    Diabetes can be better controlled with a goal less than 7.0.  A1c has decreased slightly to 7.7%  - The increase in the insulin is helping and that this is improving the sugar numbers, and the triglycerides  - You have been doing excellent with dietary modifications, cutting down on carbohydrates and moderating sweets  - We are happy to hear the knees are feeling better with the gel injections.  Lets go ahead and proceed with exercise as tolerated.  Continue with usual fall precautions.    we will keep the same medications in place for now  - At our next visit, we will try to find other ways to keep the sugars down to prevent the spikes during the wintertime.  It is understandable given holiday festivities, in cold weather limiting exercise and physical activity.    Cholesterol remains well-controlled and you have improved your sugar levels tremendously.  Keep up the good work with nutrition optimization and exercise as able  - The Vascepa has helped with lowering the triglycerides.    Kidney status remains stable at this time.  Will continue following with Dr. Salomon  -Continue with good water intake    -There is been no recurrence of renal cell carcinoma, follow-up with surveillance CT scans with Dr. Mireles.  Will await the results of your CT scan from today    - Please make an appointment with ophthalmology for usual eye examinations  -Please continue checking your blood pressures at home and notify us if they are increasing   - please continue checking your blood sugars at home and notify us if they are increasing  - Continue following dermatology for usual skin checks    Thankfully no residual cancer from your last surgical excision with Dr. Leal    - Cardiac status remains stable, management to be continued with Dr. Hyde  -Vaccines you may be due for: COVID dose #7, We recommended checking with your insurance  for coverage of Shingrix, a 2-dose shingles vaccine that can be obtained at the pharmacy if there is adequate coverage through your insurance plan.    Return to clinic in 3-4 months for diabetic follow-up.

## 2025-06-03 ENCOUNTER — HOSPITAL ENCOUNTER (OUTPATIENT)
Dept: CT IMAGING | Facility: HOSPITAL | Age: 80
Discharge: HOME | End: 2025-06-03
Attending: STUDENT IN AN ORGANIZED HEALTH CARE EDUCATION/TRAINING PROGRAM
Payer: MEDICARE

## 2025-06-03 ENCOUNTER — OFFICE VISIT (OUTPATIENT)
Dept: INTERNAL MEDICINE CLINIC | Facility: CLINIC | Age: 80
End: 2025-06-03

## 2025-06-03 VITALS
OXYGEN SATURATION: 97 % | DIASTOLIC BLOOD PRESSURE: 56 MMHG | HEIGHT: 62.75 IN | HEART RATE: 55 BPM | WEIGHT: 167.19 LBS | SYSTOLIC BLOOD PRESSURE: 136 MMHG | BODY MASS INDEX: 30 KG/M2 | TEMPERATURE: 99 F

## 2025-06-03 DIAGNOSIS — I25.5 ISCHEMIC CARDIOMYOPATHY: ICD-10-CM

## 2025-06-03 DIAGNOSIS — G89.29 CHRONIC MIDLINE LOW BACK PAIN WITHOUT SCIATICA: ICD-10-CM

## 2025-06-03 DIAGNOSIS — I10 ESSENTIAL HYPERTENSION: ICD-10-CM

## 2025-06-03 DIAGNOSIS — E11.9 TYPE 2 DIABETES MELLITUS WITHOUT COMPLICATION, WITHOUT LONG-TERM CURRENT USE OF INSULIN (HCC): Primary | ICD-10-CM

## 2025-06-03 DIAGNOSIS — E78.2 HYPERLIPIDEMIA, MIXED: ICD-10-CM

## 2025-06-03 DIAGNOSIS — N18.4 CKD (CHRONIC KIDNEY DISEASE), STAGE IV (HCC): ICD-10-CM

## 2025-06-03 DIAGNOSIS — E55.9 VITAMIN D DEFICIENCY: ICD-10-CM

## 2025-06-03 DIAGNOSIS — M54.50 CHRONIC MIDLINE LOW BACK PAIN WITHOUT SCIATICA: ICD-10-CM

## 2025-06-03 DIAGNOSIS — C64.1 RENAL CELL CARCINOMA OF RIGHT KIDNEY (HCC): ICD-10-CM

## 2025-06-03 DIAGNOSIS — E78.00 PURE HYPERCHOLESTEROLEMIA: ICD-10-CM

## 2025-06-03 DIAGNOSIS — Z90.5 S/P NEPHRECTOMY: ICD-10-CM

## 2025-06-03 PROCEDURE — 74176 CT ABD & PELVIS W/O CONTRAST: CPT | Performed by: STUDENT IN AN ORGANIZED HEALTH CARE EDUCATION/TRAINING PROGRAM

## 2025-06-03 PROCEDURE — 99214 OFFICE O/P EST MOD 30 MIN: CPT | Performed by: INTERNAL MEDICINE

## 2025-06-03 PROCEDURE — 71250 CT THORAX DX C-: CPT | Performed by: STUDENT IN AN ORGANIZED HEALTH CARE EDUCATION/TRAINING PROGRAM

## 2025-06-03 RX ORDER — ICOSAPENT ETHYL 1000 MG/1
2 CAPSULE ORAL 2 TIMES DAILY
COMMUNITY
Start: 2024-10-09

## 2025-06-06 NOTE — TELEPHONE ENCOUNTER
Called patient- informed her she does not need labs since she recently had labs by pcp and will be reviewed at her visit with Dr. Salomon.  
Does patient need Pre-clinical labs?    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  
Nothing more needed  
Patient wanted to know if she is due for labs.  Please call.  Thank you.  
No

## 2025-06-10 ENCOUNTER — OFFICE VISIT (OUTPATIENT)
Age: 80
End: 2025-06-10
Attending: STUDENT IN AN ORGANIZED HEALTH CARE EDUCATION/TRAINING PROGRAM
Payer: MEDICARE

## 2025-06-10 VITALS
DIASTOLIC BLOOD PRESSURE: 47 MMHG | RESPIRATION RATE: 18 BRPM | OXYGEN SATURATION: 98 % | WEIGHT: 164.38 LBS | TEMPERATURE: 98 F | BODY MASS INDEX: 29.49 KG/M2 | HEIGHT: 62.52 IN | HEART RATE: 60 BPM | SYSTOLIC BLOOD PRESSURE: 156 MMHG

## 2025-06-10 DIAGNOSIS — Z90.5 S/P NEPHRECTOMY: ICD-10-CM

## 2025-06-10 DIAGNOSIS — N18.32 STAGE 3B CHRONIC KIDNEY DISEASE (HCC): ICD-10-CM

## 2025-06-10 DIAGNOSIS — C64.1 RENAL CELL CARCINOMA OF RIGHT KIDNEY (HCC): Primary | ICD-10-CM

## 2025-06-10 NOTE — PROGRESS NOTES
Oncology Progress Note    Chief Complaint:  Hx of RCC s/p right radical nephrectomy.     Oncology History:  79 year old referred by Dr. Salomon RCC, clear type, grade 1 pT2b post nephrectomy 2020. Clinically she feels well. No concerns or complaints.     Interval History:  Feeling well, continues to remain relatively active. She denies any acute complaint.     Past Medical History:  Past Medical History:    Adenomatous colon polyp    colonoscopy 3/13 Dr. Martínez-also diverticulosis and int hem.    Allergic conjunctivitis    Dr. Dodd    Back problem    CAD (coronary artery disease)    Cardiac cath 22-Dr. Swenson-stents placed to LAD and diagonal.     Cancer (HCC)    Congestive heart disease (HCC)    Diabetes (HCC)    Diabetes mellitus (HCC)    Diverticulosis    Essential hypertension    Glaucoma    Gout    Hyperlipidemia    Hypertension, essential    Internal hemorrhoids    Ischemic cardiomyopathy    LBBB (left bundle branch block)    nl stress echo -Dr. Jones    Osteopenia    f/u dexa nl in     Renal cell carcinoma (HCC)     robotic asissted R radical nephrectomy 20 Dr Denise.    Type 2 diabetes mellitus (HCC)       Past Surgical History:  Past Surgical History:   Procedure Laterality Date    Back surgery  [id number]    Cataract  1998    Cataract extraction Bilateral     Dr. Dodd    Cholecystectomy  1981    at [facility]    Colonoscopy N/A 2018    Procedure: COLONOSCOPY, POSSIBLE BIOPSY, POSSIBLE POLYPECTOMY 74186;  Surgeon: Jersey Martínez MD;  [place] [facility] localization wire 1 site left (cpt=19281)      Nephrectomy Right 2020     robotic asissted R radical nephrectomy 20 Dr Denise.      1968       Family History:  Family History   Problem Relation Age of Onset    Stroke Father          age 80    Other (unknown cause of death) Mother          age 83    Diabetes Mother     Hypertension Brother     Other (1 brother) Other          living and well    Other (1 son, 1 daughter) Other     Breast Cancer Neg        Social History:  Unchanged from consult note    Current Medications:    Current Outpatient Medications:     VASCEPA 1 g Oral Cap, Take 2 capsules by mouth 2 (two) times daily., Disp: , Rfl:     empagliflozin (JARDIANCE) 25 MG Oral Tab, Take 1 tablet (25 mg total) by mouth daily., Disp: 90 tablet, Rfl: 3    ISOSORBIDE MONONITRATE ER 30 MG Oral Tablet 24 Hr, TAKE 1 TABLET DAILY, Disp: 90 tablet, Rfl: 3    FREESTYLE LITE TEST In Vitro Strip, USE TO TEST 3 TIMES A DAY, Disp: 300 strip, Rfl: 3    insulin aspart 100 Units/mL Subcutaneous Solution Pen-injector, Take 1-5 U with each meal, Disp: 15 mL, Rfl: 3    insulin glargine (BASAGLAR KWIKPEN) 100 UNIT/ML Subcutaneous Solution Pen-injector, Inject 28 Units into the skin every morning., Disp: 30 mL, Rfl: 3    AMLODIPINE 5 MG Oral Tab, TAKE 1 TABLET DAILY, Disp: 90 tablet, Rfl: 3    oxymetazoline 0.05 % Nasal Solution, 1 spray by Nasal route daily as needed for congestion (Nosebleed)., Disp: 15 mL, Rfl: 0    HYDRALAZINE 50 MG Oral Tab, TAKE 1 TABLET 3 TIMES A DAY, Disp: 270 tablet, Rfl: 3    furosemide 20 MG Oral Tab, Take 1 tablet (20 mg total) by mouth daily. 5/4 restarted, Disp: 90 tablet, Rfl: 3    rosuvastatin 20 MG Oral Tab, Take 1 tablet (20 mg total) by mouth nightly., Disp: 90 tablet, Rfl: 0    sacubitril-valsartan  MG Oral Tab, Take 1 tablet by mouth 2 (two) times daily., Disp: , Rfl:     Insulin Pen Needle (BD PEN NEEDLE MINI U/F) 31G X 5 MM Does not apply Misc, Use three times daily with insulin., Disp: 300 each, Rfl: 3    metoprolol succinate ER 50 MG Oral Tablet 24 Hr, Take 1 tablet (50 mg total) by mouth 2 (two) times a day., Disp: 180 tablet, Rfl: 3    aspirin 81 MG Oral Tab EC, Take 1 tablet (81 mg total) by mouth daily., Disp: , Rfl: 0    LUMIGAN 0.01 % Ophthalmic Solution, Place 1 drop into both eyes daily., Disp: , Rfl:     Allergies:  Allergies   Allergen  Reactions    Bactrim [Sulfamethoxazole W/Trimethoprim] NAUSEA AND VOMITING     Nausea and vomiting in 7/2018    Atorvastatin      Other reaction(s): ears ring     Review of Systems:  All other systems reviewed and negative x10 except as listed above    Vital Signs:  LMP  (LMP Unknown)     Physical Examination:  Performance Status: 0  General: Patient is alert and oriented x 3, not in acute distress.  HEENT: EOMs intact. Oropharynx is clear.   Neck: No palpable lymphadenopathy. Neck is supple.  Chest: Symmetric expansion, nonlabored breathing  Abdomen: Soft, non tender. ND  Extremities: No edema, cyanosis, or bruising  Neurological: motor strength grossly intact  Psych: appropriate mood and affect      Laboratory assessed and reviewed:  Lab Results   Component Value Date     (H) 05/27/2025    BUN 32 (H) 05/27/2025    BUNCREA 18.4 05/27/2025    CREATSERUM 1.74 (H) 05/27/2025    ANIONGAP 9 05/27/2025    GFRNAA 30 (L) 07/28/2022    GFRAA 35 (L) 07/28/2022    CA 8.9 05/27/2025    OSMOCALC 300 (H) 05/27/2025    ALKPHO 77 05/27/2025    AST 17 05/27/2025    ALT 12 05/27/2025    ALKPHOS 62 07/08/2016    BILT 0.5 05/27/2025    TP 6.4 05/27/2025    ALB 4.4 05/27/2025    GLOBULIN 2.0 05/27/2025    AGRATIO 1.7 07/08/2016     05/27/2025    K 4.8 05/27/2025     05/27/2025    CO2 22.0 05/27/2025     Impression and Plan:  Clear cell RCC, grade 1, pT2nx stage II s/p right nephrectomy 9/2/2020 (Dr Denise)  Doing well, continue active surveillance per [facility]    CT remains GAVI, clinically remains stable.  We will see her back in 1 year for surveillance CT which will complete 5 years of surveillance imaging.  We will attempt contrast but given her renal function, if creatinine clearance less than 30, we can proceed with a noncontrast CT.  Call prn in there interim with any questions or concerns.    MARGARITA Amaya

## 2025-07-08 RX ORDER — HYDRALAZINE HYDROCHLORIDE 50 MG/1
50 TABLET, FILM COATED ORAL 3 TIMES DAILY
Qty: 270 TABLET | Refills: 3 | OUTPATIENT
Start: 2025-07-08

## 2025-07-08 NOTE — TELEPHONE ENCOUNTER
Chris Pruitt,  Can you refill hydralazine if still appropriate. I haven't seen Paola since 2023.     Thanks,  Gabriela

## 2025-07-09 RX ORDER — HYDRALAZINE HYDROCHLORIDE 50 MG/1
50 TABLET, FILM COATED ORAL 3 TIMES DAILY
Qty: 270 TABLET | Refills: 3 | Status: SHIPPED | OUTPATIENT
Start: 2025-07-09

## 2025-07-22 SDOH — ECONOMIC STABILITY: FOOD INSECURITY: WITHIN THE PAST 12 MONTHS, THE FOOD YOU BOUGHT JUST DIDN'T LAST AND YOU DIDN'T HAVE MONEY TO GET MORE.: PATIENT DECLINED

## 2025-07-22 SDOH — ECONOMIC STABILITY: HOUSING INSECURITY: WHAT IS YOUR LIVING SITUATION TODAY?: PATIENT DECLINED

## 2025-07-22 SDOH — ECONOMIC STABILITY: TRANSPORTATION INSECURITY
IN THE PAST 12 MONTHS, HAS LACK OF RELIABLE TRANSPORTATION KEPT YOU FROM MEDICAL APPOINTMENTS, MEETINGS, WORK OR FROM GETTING THINGS NEEDED FOR DAILY LIVING?: PATIENT DECLINED

## 2025-07-22 SDOH — ECONOMIC STABILITY: HOUSING INSECURITY: DO YOU HAVE PROBLEMS WITH ANY OF THE FOLLOWING?: PATIENT DECLINED

## 2025-07-22 ASSESSMENT — SOCIAL DETERMINANTS OF HEALTH (SDOH)
IN THE PAST 12 MONTHS, HAS THE ELECTRIC, GAS, OIL, OR WATER COMPANY THREATENED TO SHUT OFF SERVICE IN YOUR HOME?: PATIENT DECLINED

## 2025-07-25 PROBLEM — I25.5 ISCHEMIC CARDIOMYOPATHY: Status: ACTIVE | Noted: 2022-06-03

## 2025-07-25 PROBLEM — M53.3 SACROILIAC JOINT PAIN: Status: RESOLVED | Noted: 2023-04-11 | Resolved: 2025-07-25

## 2025-07-25 PROBLEM — M54.50 CHRONIC MIDLINE LOW BACK PAIN WITHOUT SCIATICA: Status: ACTIVE | Noted: 2023-03-07

## 2025-07-25 PROBLEM — C64.1 RENAL CELL CARCINOMA OF RIGHT KIDNEY  (CMD): Status: RESOLVED | Noted: 2020-09-09 | Resolved: 2025-07-25

## 2025-07-25 PROBLEM — I50.9 ACUTE ON CHRONIC CONGESTIVE HEART FAILURE  (CMD): Status: RESOLVED | Noted: 2022-05-25 | Resolved: 2025-07-25

## 2025-07-25 PROBLEM — M54.16 LUMBAR RADICULOPATHY: Status: ACTIVE | Noted: 2023-04-11

## 2025-07-25 PROBLEM — Z90.5 S/P NEPHRECTOMY: Status: ACTIVE | Noted: 2020-09-09

## 2025-07-25 PROBLEM — N18.30 STAGE 3 CHRONIC KIDNEY DISEASE  (CMD): Status: ACTIVE | Noted: 2019-10-16

## 2025-07-25 PROBLEM — G89.29 CHRONIC MIDLINE LOW BACK PAIN WITHOUT SCIATICA: Status: ACTIVE | Noted: 2023-03-07

## 2025-07-25 PROBLEM — I50.43: Chronic | Status: RESOLVED | Noted: 2022-07-13 | Resolved: 2025-07-25

## 2025-07-25 PROBLEM — I50.42 CHRONIC COMBINED SYSTOLIC AND DIASTOLIC CHF, NYHA CLASS 2  (CMD): Chronic | Status: ACTIVE | Noted: 2022-07-28

## 2025-07-25 PROBLEM — M51.26 LUMBAR DISC HERNIATION: Status: ACTIVE | Noted: 2023-07-21

## 2025-07-25 PROBLEM — M46.1 ARTHRITIS OF SACROILIAC JOINT OF BOTH SIDES (CMD): Status: ACTIVE | Noted: 2023-04-11

## 2025-07-25 PROBLEM — Z95.5 STENTED CORONARY ARTERY: Status: RESOLVED | Noted: 2022-06-03 | Resolved: 2025-07-25

## 2025-07-25 PROBLEM — Z01.818 PRE-OP TESTING: Status: RESOLVED | Noted: 2023-09-21 | Resolved: 2025-07-25

## 2025-07-25 PROBLEM — R93.1 ELEVATED CORONARY ARTERY CALCIUM SCORE: Status: ACTIVE | Noted: 2019-04-03

## 2025-07-25 RX ORDER — FUROSEMIDE 20 MG/1
20 TABLET ORAL DAILY
COMMUNITY

## 2025-07-25 RX ORDER — BIMATOPROST 0.1 MG/ML
SOLUTION/ DROPS OPHTHALMIC
COMMUNITY

## 2025-07-25 RX ORDER — METOPROLOL SUCCINATE 50 MG/1
50 TABLET, EXTENDED RELEASE ORAL 2 TIMES DAILY
COMMUNITY

## 2025-07-25 RX ORDER — SPIRONOLACTONE 25 MG/1
TABLET ORAL
COMMUNITY

## 2025-07-25 RX ORDER — ICOSAPENT ETHYL 1 G/1
2 CAPSULE ORAL 2 TIMES DAILY
COMMUNITY

## 2025-07-25 RX ORDER — ROSUVASTATIN CALCIUM 20 MG/1
20 TABLET, COATED ORAL DAILY
COMMUNITY

## 2025-07-25 RX ORDER — NETARSUDIL 0.2 MG/ML
SOLUTION/ DROPS OPHTHALMIC; TOPICAL
COMMUNITY

## 2025-07-25 RX ORDER — INSULIN DETEMIR 100 [IU]/ML
28 INJECTION, SOLUTION SUBCUTANEOUS NIGHTLY
COMMUNITY
End: 2025-07-29 | Stop reason: ALTCHOICE

## 2025-07-25 RX ORDER — HYDRALAZINE HYDROCHLORIDE 50 MG/1
50 TABLET, FILM COATED ORAL 3 TIMES DAILY
COMMUNITY
Start: 2025-07-09

## 2025-07-25 RX ORDER — ISOSORBIDE MONONITRATE 30 MG/1
30 TABLET, EXTENDED RELEASE ORAL DAILY
COMMUNITY
Start: 2025-05-09

## 2025-07-25 RX ORDER — PREDNISOLONE ACETATE 10 MG/ML
SUSPENSION/ DROPS OPHTHALMIC
COMMUNITY

## 2025-07-25 RX ORDER — CYCLOBENZAPRINE HCL 5 MG
5 TABLET ORAL 3 TIMES DAILY PRN
COMMUNITY

## 2025-07-25 RX ORDER — FLUOROURACIL 50 MG/G
CREAM TOPICAL
COMMUNITY
Start: 2025-03-18

## 2025-07-25 RX ORDER — HYDROCODONE BITARTRATE AND ACETAMINOPHEN 10; 325 MG/1; MG/1
TABLET ORAL
COMMUNITY

## 2025-07-25 RX ORDER — SACUBITRIL AND VALSARTAN 97; 103 MG/1; MG/1
1 TABLET, FILM COATED ORAL 2 TIMES DAILY
COMMUNITY

## 2025-07-29 ENCOUNTER — APPOINTMENT (OUTPATIENT)
Dept: INTERNAL MEDICINE | Age: 80
End: 2025-07-29

## 2025-07-29 VITALS
DIASTOLIC BLOOD PRESSURE: 73 MMHG | OXYGEN SATURATION: 98 % | HEART RATE: 57 BPM | TEMPERATURE: 97.1 F | SYSTOLIC BLOOD PRESSURE: 152 MMHG | RESPIRATION RATE: 16 BRPM | WEIGHT: 165.2 LBS

## 2025-07-29 DIAGNOSIS — Z13.220 SCREENING FOR LIPOID DISORDERS: ICD-10-CM

## 2025-07-29 DIAGNOSIS — Z76.89 ENCOUNTER TO ESTABLISH CARE: Primary | ICD-10-CM

## 2025-07-29 DIAGNOSIS — Z79.4 TYPE 2 DIABETES MELLITUS WITH HYPERGLYCEMIA, WITH LONG-TERM CURRENT USE OF INSULIN (CMD): ICD-10-CM

## 2025-07-29 DIAGNOSIS — N18.32 STAGE 3B CHRONIC KIDNEY DISEASE  (CMD): ICD-10-CM

## 2025-07-29 DIAGNOSIS — I25.10 CORONARY ARTERY DISEASE INVOLVING NATIVE CORONARY ARTERY OF NATIVE HEART WITHOUT ANGINA PECTORIS: ICD-10-CM

## 2025-07-29 DIAGNOSIS — M1A.09X0 IDIOPATHIC CHRONIC GOUT OF MULTIPLE SITES WITHOUT TOPHUS: ICD-10-CM

## 2025-07-29 DIAGNOSIS — E55.9 VITAMIN D DEFICIENCY, UNSPECIFIED: ICD-10-CM

## 2025-07-29 DIAGNOSIS — Z00.00 ROUTINE GENERAL MEDICAL EXAMINATION AT A HEALTH CARE FACILITY: ICD-10-CM

## 2025-07-29 DIAGNOSIS — E78.2 MIXED HYPERLIPIDEMIA: ICD-10-CM

## 2025-07-29 DIAGNOSIS — I50.42 CHRONIC COMBINED SYSTOLIC AND DIASTOLIC CHF, NYHA CLASS 2  (CMD): Chronic | ICD-10-CM

## 2025-07-29 DIAGNOSIS — Z00.00 LABORATORY TESTS ORDERED AS PART OF A COMPLETE PHYSICAL EXAM (CPE): ICD-10-CM

## 2025-07-29 DIAGNOSIS — I10 ESSENTIAL HYPERTENSION: ICD-10-CM

## 2025-07-29 DIAGNOSIS — Z85.528 HISTORY OF RENAL CELL CARCINOMA: ICD-10-CM

## 2025-07-29 DIAGNOSIS — E11.65 TYPE 2 DIABETES MELLITUS WITH HYPERGLYCEMIA, WITH LONG-TERM CURRENT USE OF INSULIN (CMD): ICD-10-CM

## 2025-07-29 DIAGNOSIS — Z13.0 SCREENING FOR DEFICIENCY ANEMIA: ICD-10-CM

## 2025-07-29 RX ORDER — AMLODIPINE BESYLATE 5 MG/1
5 TABLET ORAL DAILY
COMMUNITY

## 2025-07-29 RX ORDER — INSULIN GLARGINE 100 [IU]/ML
31 INJECTION, SOLUTION SUBCUTANEOUS NIGHTLY
COMMUNITY
Start: 2025-05-21

## 2025-07-29 RX ORDER — BLOOD-GLUCOSE METER
KIT MISCELLANEOUS
COMMUNITY
Start: 2025-04-22

## 2025-07-29 RX ORDER — INSULIN ASPART 100 [IU]/ML
INJECTION, SOLUTION INTRAVENOUS; SUBCUTANEOUS
COMMUNITY
Start: 2025-04-03

## 2025-08-13 ENCOUNTER — LAB ENCOUNTER (OUTPATIENT)
Dept: LAB | Age: 80
End: 2025-08-13
Attending: INTERNAL MEDICINE

## 2025-08-13 DIAGNOSIS — N18.32 STAGE 3B CHRONIC KIDNEY DISEASE (HCC): ICD-10-CM

## 2025-08-13 LAB
ALBUMIN SERPL-MCNC: 4.6 G/DL (ref 3.2–4.8)
ANION GAP SERPL CALC-SCNC: 10 MMOL/L (ref 0–18)
BUN BLD-MCNC: 33 MG/DL (ref 9–23)
BUN/CREAT SERPL: 18.3 (ref 10–20)
CALCIUM BLD-MCNC: 9.2 MG/DL (ref 8.7–10.4)
CHLORIDE SERPL-SCNC: 110 MMOL/L (ref 98–112)
CO2 SERPL-SCNC: 23 MMOL/L (ref 21–32)
CREAT BLD-MCNC: 1.8 MG/DL (ref 0.55–1.02)
CREAT UR-SCNC: 63 MG/DL
EGFRCR SERPLBLD CKD-EPI 2021: 28 ML/MIN/1.73M2 (ref 60–?)
GLUCOSE BLD-MCNC: 170 MG/DL (ref 70–99)
MICROALBUMIN UR-MCNC: 7.6 MG/DL
MICROALBUMIN/CREAT 24H UR-RTO: 120.6 UG/MG (ref ?–30)
OSMOLALITY SERPL CALC.SUM OF ELEC: 307 MOSM/KG (ref 275–295)
PHOSPHATE SERPL-MCNC: 4.4 MG/DL (ref 2.4–5.1)
POTASSIUM SERPL-SCNC: 4.1 MMOL/L (ref 3.5–5.1)
PTH-INTACT SERPL-MCNC: 163.6 PG/ML (ref 18.5–88)
SODIUM SERPL-SCNC: 143 MMOL/L (ref 136–145)
VIT D+METAB SERPL-MCNC: 33.8 NG/ML (ref 30–100)

## 2025-08-13 PROCEDURE — 82043 UR ALBUMIN QUANTITATIVE: CPT

## 2025-08-13 PROCEDURE — 80069 RENAL FUNCTION PANEL: CPT

## 2025-08-13 PROCEDURE — 82306 VITAMIN D 25 HYDROXY: CPT

## 2025-08-13 PROCEDURE — 36415 COLL VENOUS BLD VENIPUNCTURE: CPT

## 2025-08-13 PROCEDURE — 82570 ASSAY OF URINE CREATININE: CPT

## 2025-08-13 PROCEDURE — 83970 ASSAY OF PARATHORMONE: CPT

## 2025-08-22 ENCOUNTER — OFFICE VISIT (OUTPATIENT)
Facility: CLINIC | Age: 80
End: 2025-08-22

## 2025-08-22 VITALS
BODY MASS INDEX: 30.14 KG/M2 | DIASTOLIC BLOOD PRESSURE: 65 MMHG | HEIGHT: 62.52 IN | HEART RATE: 73 BPM | WEIGHT: 168 LBS | SYSTOLIC BLOOD PRESSURE: 114 MMHG

## 2025-08-22 DIAGNOSIS — C64.1 RENAL CELL CARCINOMA OF RIGHT KIDNEY (HCC): ICD-10-CM

## 2025-08-22 DIAGNOSIS — N18.32 STAGE 3B CHRONIC KIDNEY DISEASE (HCC): Primary | ICD-10-CM

## 2025-08-22 DIAGNOSIS — N18.32 HYPERTENSIVE KIDNEY DISEASE WITH STAGE 3B CHRONIC KIDNEY DISEASE (HCC): ICD-10-CM

## 2025-08-22 DIAGNOSIS — I12.9 HYPERTENSIVE KIDNEY DISEASE WITH STAGE 3B CHRONIC KIDNEY DISEASE (HCC): ICD-10-CM

## 2025-08-22 DIAGNOSIS — I50.9 CONGESTIVE HEART FAILURE, UNSPECIFIED HF CHRONICITY, UNSPECIFIED HEART FAILURE TYPE (HCC): ICD-10-CM

## 2025-08-22 DIAGNOSIS — E11.21 DIABETIC GLOMERULOPATHY (HCC): ICD-10-CM

## 2025-08-22 PROCEDURE — 99214 OFFICE O/P EST MOD 30 MIN: CPT | Performed by: INTERNAL MEDICINE

## 2025-10-28 ENCOUNTER — APPOINTMENT (OUTPATIENT)
Dept: INTERNAL MEDICINE | Age: 80
End: 2025-10-28

## (undated) DIAGNOSIS — E78.2 HYPERLIPIDEMIA, MIXED: ICD-10-CM

## (undated) DIAGNOSIS — I10 ESSENTIAL HYPERTENSION: ICD-10-CM

## (undated) DIAGNOSIS — E11.9 TYPE 2 DIABETES MELLITUS WITHOUT COMPLICATION, WITHOUT LONG-TERM CURRENT USE OF INSULIN (HCC): ICD-10-CM

## (undated) DEVICE — AIRSEAL 12 MM ACCESS PORT AND PALM GRIP OBTURATOR WITH BLADELESS OPTICAL TIP, 120 MM LENGTH: Brand: AIRSEAL

## (undated) DEVICE — SUTURE VCRL SZ 3-0 L27IN ABSRB UD L26MM SH

## (undated) DEVICE — C-ARMOR C-ARM EQUIPMENT COVERS CLEAR STERILE UNIVERSAL FIT 12 PER CASE: Brand: C-ARMOR

## (undated) DEVICE — SOL  .9 3000ML

## (undated) DEVICE — KIT SUR ACCS MAXCESS

## (undated) DEVICE — LAP LIGASURE 5MM BLUNT

## (undated) DEVICE — TISSUE RETRIEVAL SYSTEM: Brand: INZII RETRIEVAL SYSTEM

## (undated) DEVICE — ENCORE® LATEX MICRO SIZE 7, STERILE LATEX POWDER-FREE SURGICAL GLOVE: Brand: ENCORE

## (undated) DEVICE — SUCTION CANISTER, 3000CC,SAFELINER: Brand: DEROYAL

## (undated) DEVICE — CANNULA SEAL

## (undated) DEVICE — FLOSEAL HEMOSTATIC MATRIX, 5ML: Brand: FLOSEAL HEMOSTATIC MATRIX

## (undated) DEVICE — MONOPOLAR CURVED SCISSORS: Brand: ENDOWRIST

## (undated) DEVICE — TRI-LUMEN FILTERED TUBE SET WITH ACTIVATED CHARCOAL FILTER: Brand: AIRSEAL

## (undated) DEVICE — INSUFFLATION NEEDLE TO ESTABLISH PNEUMOPERITONEUM.: Brand: INSUFFLATION NEEDLE

## (undated) DEVICE — PROXIMATE SKIN STAPLERS (35 WIDE) CONTAINS 35 STAINLESS STEEL STAPLES (FIXED HEAD): Brand: PROXIMATE

## (undated) DEVICE — TRAY INSTR PWR NUVASIVE

## (undated) DEVICE — SPK10329 JACKSON KIT: Brand: SPK10329 JACKSON KIT

## (undated) DEVICE — SUTURE VICRYL 2-0 SH

## (undated) DEVICE — FENESTRATED BIPOLAR FORCEPS: Brand: ENDOWRIST

## (undated) DEVICE — SUTURE PROLENE 4-0 RB-1

## (undated) DEVICE — 3M™ STERI-DRAPE™ PATIENT ISOLATION DRAPE 1014: Brand: STERI-DRAPE™

## (undated) DEVICE — DISPOSABLE GRASPER CARTRIDGE: Brand: DIRECT DRIVE REPOSABLE GRASPERS

## (undated) DEVICE — 3M™ TEGADERM™ TRANSPARENT FILM DRESSING, 1626W, 4 IN X 4-3/4 IN (10 CM X 12 CM), 50 EACH/CARTON, 4 CARTON/CASE: Brand: 3M™ TEGADERM™

## (undated) DEVICE — RELOAD STPLR 30MM EGIA 3-STPL

## (undated) DEVICE — DERMABOND LIQUID ADHESIVE

## (undated) DEVICE — ARM DRAPE

## (undated) DEVICE — DRAPE SRG 90X60IN BCK TBL CVR

## (undated) DEVICE — SUTURE PDS II 0 CTX

## (undated) DEVICE — TUBING MEGADYNE SPECULUM

## (undated) DEVICE — ADHESIVE SKIN TOP FOR WND CLSR DERMBND ADV

## (undated) DEVICE — GAMMEX® PI HYBRID SIZE 8.5, STERILE POWDER-FREE SURGICAL GLOVE, POLYISOPRENE AND NEOPRENE BLEND: Brand: GAMMEX

## (undated) DEVICE — CLIP HEMOLOK LARGE PURPLE

## (undated) DEVICE — UNDYED BRAIDED (POLYGLACTIN 910), SYNTHETIC ABSORBABLE SUTURE: Brand: COATED VICRYL

## (undated) DEVICE — ELECTRODE ES L16.5CM BLDE MPLR OPN APPRCH EZ

## (undated) DEVICE — NEEDLE NRV STIM BVL TIP INSUL PEDCL ACCS SYS

## (undated) DEVICE — NON-ADHERENT DRESSING: Brand: TELFA

## (undated) DEVICE — GAMMEX® PI HYBRID SIZE 6, STERILE POWDER-FREE SURGICAL GLOVE, POLYISOPRENE AND NEOPRENE BLEND: Brand: GAMMEX

## (undated) DEVICE — SUTURE MCRYL SZ 3-0 L27IN ABSRB UD L24MM PS-1

## (undated) DEVICE — BLADELESS OBTURATOR: Brand: WECK VISTA

## (undated) DEVICE — APPLCTR ENDO FLOSEAL DISP

## (undated) DEVICE — TROCAR: Brand: KII FIOS FIRST ENTRY

## (undated) DEVICE — PAD N ADH W3XL8IN POLY COT SFT PERF FLM ABSRB

## (undated) DEVICE — SUTURE MONOCRYL 4-0 PS-2

## (undated) DEVICE — 3M™ IOBAN™ 2 ANTIMICROBIAL INCISE DRAPE 6650EZ: Brand: IOBAN™ 2

## (undated) DEVICE — GOWN SURG L DISP LEV 3 AERO BLU RAGLAN SL ST

## (undated) DEVICE — TIP COVER ACCESSORY

## (undated) DEVICE — SUTURE PDS II 0 CT-2

## (undated) DEVICE — DRAPE SHEET LAPCHOLE 124X100X7

## (undated) DEVICE — RELINE MAS BLADE FASCIAL SPLIT

## (undated) DEVICE — KIT DIL FOR EXTRM LUM IF NEUROVISION M5 XLIF

## (undated) DEVICE — WRAP COOLING BACK W/NO PILLOW

## (undated) DEVICE — SOLUTION IRRIG 1000ML 0.9% NACL USP BTL

## (undated) DEVICE — K WIRE FIX NIT BVL BLNT TIP PRECEPT

## (undated) DEVICE — SPONGE GZ 4XL4IN 100% COT 12 PLY TYP VII WVN

## (undated) DEVICE — SOL  .9 1000ML BTL

## (undated) DEVICE — 3M™ STERI-DRAPE™ U-DRAPE 1015: Brand: STERI-DRAPE™

## (undated) DEVICE — PEN: MARKING STD PT 100/CS: Brand: MEDICAL ACTION INDUSTRIES

## (undated) DEVICE — ENDOPATH 5MM ENDOSCOPIC BLUNT TIP DISSECTORS (12 POUCHES CONTAINING 3 DISSECTORS EACH): Brand: ENDOPATH

## (undated) DEVICE — KIT HEMSTAT MTRX 8ML PORCINE GEL HUM THROM

## (undated) DEVICE — DEVICE ENDO GIA 45 V/M

## (undated) DEVICE — LAPAROVUE VISIBILITY SYSTEM LAPAROSCOPIC SOLUTIONS: Brand: LAPAROVUE

## (undated) DEVICE — SUTURE MONOCRYL 4-0 Y935H

## (undated) DEVICE — PENCIL ES BTTN SWCH W/ TIP HOLSTER E-Z CLN

## (undated) DEVICE — ROBOTIC: Brand: MEDLINE INDUSTRIES, INC.

## (undated) DEVICE — STAPLER INTNL 26CMX4MM X

## (undated) DEVICE — SUTURE VCRL SZ 2-0 L27IN ABSRB UD L26MM CT-2

## (undated) DEVICE — GAUZE SPONGES,12 PLY: Brand: CURITY

## (undated) DEVICE — COLUMN DRAPE

## (undated) DEVICE — PROGRASP FORCEPS: Brand: ENDOWRIST

## (undated) DEVICE — LAMINECTOMY: Brand: MEDLINE INDUSTRIES, INC.

## (undated) DEVICE — 3.0MM PRECISION NEURO (MATCH HEAD)

## (undated) NOTE — LETTER
Hospital Discharge Documentation  Please phone to schedule a hospital follow up appointment. No discharge summary available at this time, below is the most recent progress note  for your review .         From: [id number] [facility]  Phone: [phone] distress. She has no wheezes. She has no rales. Abdominal: Soft. She exhibits no distension. There is no tenderness. There is no rebound and no guarding. ABD LAP INCISIONS, HYPOACTIVE BOWEL SOUNDS. Musculoskeletal: Normal range of motion.    Neurologic

## (undated) NOTE — LETTER
November 24, 2021    M[address]ella [place] [address]    Dear Nir Uriostegui: It was a pleasure speaking with you over the phone recently.  To follow up, I wanted to send you some contact information to utilize when you have a quest

## (undated) NOTE — LETTER
RE: Damon Mcadams  : 41        Please be advised, [facility] has an anticoagulation policy stating blood thinners need to be stopped prior to receiving an injection. Mrs. Jourdan Jiang is on ASA 81 MG & PLAVIX; this would need to be stopped 7-days prior to her injection which is scheduled for 5/15/23    She will be able to restart the medication post-injection. We are asking for her [de-identified] approval on this issue.          Sincerely,    Dory Canas, [facility]  Patient [address] [place] [address] for Pain Management    P: [phone]  F: [phone]

## (undated) NOTE — MR AVS SNAPSHOT
[place]  [address] [place] [address]  [phone]               Thank you for choosing us for your health care visit with Galileo Orta MD.  We are glad to serve you and happy to provide you with this summary of your visit.   Kevin AmLODIPine Besylate 10 MG Tabs   Take 1 tablet (10 mg total) by mouth daily. Commonly known as:  NORVASC           aspirin 81 MG Tabs   Take 1 tablet by mouth daily. Enalapril Maleate 20 MG Tabs   Take 1 tablet (20 mg total) by mouth daily. Eat plenty of protein, keep the fat content low Sugars:  sodas and sports drinks, candies and desserts   Eat plenty of low-fat dairy products High fat meats and dairy   Choose whole grain products Foods high in sodium   Water is best for hydration Fast elisabeth

## (undated) NOTE — LETTER
AUTHORIZATION FOR SURGICAL OPERATION OR OTHER PROCEDURE    1. I hereby authorize Dr. Renetta Barksdale, and [address] [place] [address] staff assigned to my case to perform the following operation and/or procedure at the [address] [place] [address]:      ENDOMETRIAL BIOPSY       2.  My physician has explained the nature and purpose of the operation or other procedure, possible alternative methods of treatment, the risks involved, and the possibility of complication to me. I acknowledge that no guarantee has been made as to the result that may be obtained. 3.  I recognize that, during the course of this operation, or other procedure, unforseen conditions may necessitate additional or different procedure than those listed above. I, therefore, further authorize and request that the above named physician, his/her physician assistants or designees perform such procedures as are, in his/her professional opinion, necessary and desirable. 4.  Any tissue or organs removed in the operation or other procedure may be disposed of by and at the discretion of the [address] [place] [address] and [facility]. 5.  I understand that in the event of a medical emergency, I will be transported by local paramedics to [facility] or other hospital emergency department. 6.  I certify that I have read and fully understand the above consent to operation and/or other procedure. 7.  I acknowledge that my physician has explained sedation/analgesia administration to me including the risks and benefits. I consent to the administration of sedation/analgesia as may be necessary or desirable in the judgement of my physician. Witness signature: ___________________________________________________ Date:  ______/______/_____                    Time:  ________ A. M.  P.M.        Patient Name:  ______________________________________________________  (please print)      Patient signature: ___________________________________________________             Relationship to Patient:           []  Parent    Responsible person                          []  Spouse  In case of minor or                    [] Other  _____________   Incompetent name:  __________________________________________________                               (please print)      _____________      Responsible person  In case of minor or  Incompetent signature:  _______________________________________________    Statement of Physician  My signature below affirms that prior to the time of the procedure, I have explained to the patient and/or his/her guardian, the risks and benefits involved in the proposed treatment and any reasonable alternative to the proposed treatment. I have also explained the risks and benefits involved in the refusal of the proposed treatment and have answered the patient's questions.                         Date:  ______/______/_______  Provider                      Signature:  __________________________________________________________       Time:  ___________ A.M    P.M.

## (undated) NOTE — LETTER
AUTHORIZATION FOR SURGICAL OPERATION OR OTHER PROCEDURE    1.  I hereby authorize RAYMOND Pickett and [facility] assigned to my case to perform the following operation and/or procedure at the [facility]:    _______________ ______/______/_____                    Time:  ________ A. M.  P.M.        Patient Name:  ______________________________________________________  (please print)      Patient signature:  ___________________________________________________             Relations